# Patient Record
Sex: FEMALE | NOT HISPANIC OR LATINO | Employment: OTHER | URBAN - METROPOLITAN AREA
[De-identification: names, ages, dates, MRNs, and addresses within clinical notes are randomized per-mention and may not be internally consistent; named-entity substitution may affect disease eponyms.]

---

## 2018-10-02 ENCOUNTER — NURSE TRIAGE (OUTPATIENT)
Dept: NURSING | Facility: CLINIC | Age: 63
End: 2018-10-02

## 2018-10-03 ENCOUNTER — APPOINTMENT (OUTPATIENT)
Dept: GENERAL RADIOLOGY | Facility: CLINIC | Age: 63
End: 2018-10-03
Attending: EMERGENCY MEDICINE
Payer: MEDICARE

## 2018-10-03 ENCOUNTER — APPOINTMENT (OUTPATIENT)
Dept: CT IMAGING | Facility: CLINIC | Age: 63
End: 2018-10-03
Attending: EMERGENCY MEDICINE
Payer: MEDICARE

## 2018-10-03 ENCOUNTER — HOSPITAL ENCOUNTER (EMERGENCY)
Facility: CLINIC | Age: 63
Discharge: HOME | End: 2018-10-03
Attending: EMERGENCY MEDICINE | Admitting: EMERGENCY MEDICINE
Payer: MEDICARE

## 2018-10-03 VITALS
OXYGEN SATURATION: 100 % | BODY MASS INDEX: 30.11 KG/M2 | DIASTOLIC BLOOD PRESSURE: 71 MMHG | RESPIRATION RATE: 21 BRPM | SYSTOLIC BLOOD PRESSURE: 117 MMHG | HEART RATE: 117 BPM | TEMPERATURE: 98.1 F | WEIGHT: 170 LBS

## 2018-10-03 VITALS — SYSTOLIC BLOOD PRESSURE: 131 MMHG | TEMPERATURE: 98.4 F | OXYGEN SATURATION: 99 % | DIASTOLIC BLOOD PRESSURE: 66 MMHG

## 2018-10-03 DIAGNOSIS — L03.116 CELLULITIS OF LEFT LOWER EXTREMITY: ICD-10-CM

## 2018-10-03 DIAGNOSIS — R00.0 TACHYCARDIA: ICD-10-CM

## 2018-10-03 DIAGNOSIS — R19.7 DIARRHEA, UNSPECIFIED TYPE: ICD-10-CM

## 2018-10-03 DIAGNOSIS — R52 DIFFUSE PAIN: ICD-10-CM

## 2018-10-03 LAB
ALBUMIN SERPL-MCNC: 3.7 G/DL (ref 3.4–5)
ALBUMIN UR-MCNC: NEGATIVE MG/DL
ALP SERPL-CCNC: 99 U/L (ref 40–150)
ALT SERPL W P-5'-P-CCNC: 21 U/L (ref 0–50)
ANION GAP SERPL CALCULATED.3IONS-SCNC: 12 MMOL/L (ref 3–14)
ANION GAP SERPL CALCULATED.3IONS-SCNC: 9 MMOL/L (ref 3–14)
APPEARANCE UR: CLEAR
AST SERPL W P-5'-P-CCNC: 15 U/L (ref 0–45)
BACTERIA #/AREA URNS HPF: ABNORMAL /HPF
BASOPHILS # BLD AUTO: 0 10E9/L (ref 0–0.2)
BASOPHILS # BLD AUTO: 0.1 10E9/L (ref 0–0.2)
BASOPHILS NFR BLD AUTO: 0.5 %
BASOPHILS NFR BLD AUTO: 0.6 %
BILIRUB SERPL-MCNC: 0.6 MG/DL (ref 0.2–1.3)
BILIRUB UR QL STRIP: NEGATIVE
BUN SERPL-MCNC: 12 MG/DL (ref 7–30)
BUN SERPL-MCNC: 7 MG/DL (ref 7–30)
CALCIUM SERPL-MCNC: 7.3 MG/DL (ref 8.5–10.1)
CALCIUM SERPL-MCNC: 8.2 MG/DL (ref 8.5–10.1)
CHLORIDE SERPL-SCNC: 108 MMOL/L (ref 94–109)
CHLORIDE SERPL-SCNC: 113 MMOL/L (ref 94–109)
CO2 SERPL-SCNC: 18 MMOL/L (ref 20–32)
CO2 SERPL-SCNC: 20 MMOL/L (ref 20–32)
COLOR UR AUTO: YELLOW
CREAT SERPL-MCNC: 0.78 MG/DL (ref 0.52–1.04)
CREAT SERPL-MCNC: 0.84 MG/DL (ref 0.52–1.04)
D DIMER PPP FEU-MCNC: 0.5 UG/ML FEU (ref 0–0.5)
DIFFERENTIAL METHOD BLD: ABNORMAL
DIFFERENTIAL METHOD BLD: NORMAL
EOSINOPHIL # BLD AUTO: 0.1 10E9/L (ref 0–0.7)
EOSINOPHIL # BLD AUTO: 0.2 10E9/L (ref 0–0.7)
EOSINOPHIL NFR BLD AUTO: 1.5 %
EOSINOPHIL NFR BLD AUTO: 3 %
ERYTHROCYTE [DISTWIDTH] IN BLOOD BY AUTOMATED COUNT: 13.2 % (ref 10–15)
ERYTHROCYTE [DISTWIDTH] IN BLOOD BY AUTOMATED COUNT: 13.4 % (ref 10–15)
GFR SERPL CREATININE-BSD FRML MDRD: 68 ML/MIN/1.7M2
GFR SERPL CREATININE-BSD FRML MDRD: 75 ML/MIN/1.7M2
GLUCOSE SERPL-MCNC: 91 MG/DL (ref 70–99)
GLUCOSE SERPL-MCNC: 98 MG/DL (ref 70–99)
GLUCOSE UR STRIP-MCNC: NEGATIVE MG/DL
HCT VFR BLD AUTO: 32.7 % (ref 35–47)
HCT VFR BLD AUTO: 37 % (ref 35–47)
HGB BLD-MCNC: 10.7 G/DL (ref 11.7–15.7)
HGB BLD-MCNC: 11.8 G/DL (ref 11.7–15.7)
HGB UR QL STRIP: NEGATIVE
IMM GRANULOCYTES # BLD: 0 10E9/L (ref 0–0.4)
IMM GRANULOCYTES # BLD: 0 10E9/L (ref 0–0.4)
IMM GRANULOCYTES NFR BLD: 0.1 %
IMM GRANULOCYTES NFR BLD: 0.4 %
INTERPRETATION ECG - MUSE: NORMAL
KETONES UR STRIP-MCNC: NEGATIVE MG/DL
LACTATE BLD-SCNC: 0.9 MMOL/L (ref 0.7–2)
LEUKOCYTE ESTERASE UR QL STRIP: ABNORMAL
LIPASE SERPL-CCNC: 282 U/L (ref 73–393)
LYMPHOCYTES # BLD AUTO: 2.7 10E9/L (ref 0.8–5.3)
LYMPHOCYTES # BLD AUTO: 3.1 10E9/L (ref 0.8–5.3)
LYMPHOCYTES NFR BLD AUTO: 33.1 %
LYMPHOCYTES NFR BLD AUTO: 36.5 %
MCH RBC QN AUTO: 30.6 PG (ref 26.5–33)
MCH RBC QN AUTO: 30.7 PG (ref 26.5–33)
MCHC RBC AUTO-ENTMCNC: 31.9 G/DL (ref 31.5–36.5)
MCHC RBC AUTO-ENTMCNC: 32.7 G/DL (ref 31.5–36.5)
MCV RBC AUTO: 93 FL (ref 78–100)
MCV RBC AUTO: 96 FL (ref 78–100)
MONOCYTES # BLD AUTO: 0.5 10E9/L (ref 0–1.3)
MONOCYTES # BLD AUTO: 0.6 10E9/L (ref 0–1.3)
MONOCYTES NFR BLD AUTO: 6.4 %
MONOCYTES NFR BLD AUTO: 6.9 %
NEUTROPHILS # BLD AUTO: 3.9 10E9/L (ref 1.6–8.3)
NEUTROPHILS # BLD AUTO: 5.5 10E9/L (ref 1.6–8.3)
NEUTROPHILS NFR BLD AUTO: 52.7 %
NEUTROPHILS NFR BLD AUTO: 58.3 %
NITRATE UR QL: NEGATIVE
NRBC # BLD AUTO: 0 10*3/UL
NRBC # BLD AUTO: 0 10*3/UL
NRBC BLD AUTO-RTO: 0 /100
NRBC BLD AUTO-RTO: 0 /100
PH UR STRIP: 6 PH (ref 5–7)
PLATELET # BLD AUTO: 214 10E9/L (ref 150–450)
PLATELET # BLD AUTO: 249 10E9/L (ref 150–450)
POTASSIUM SERPL-SCNC: 3.2 MMOL/L (ref 3.4–5.3)
POTASSIUM SERPL-SCNC: 3.4 MMOL/L (ref 3.4–5.3)
PROT SERPL-MCNC: 7.5 G/DL (ref 6.8–8.8)
RBC # BLD AUTO: 3.5 10E12/L (ref 3.8–5.2)
RBC # BLD AUTO: 3.84 10E12/L (ref 3.8–5.2)
RBC #/AREA URNS AUTO: <1 /HPF (ref 0–2)
SODIUM SERPL-SCNC: 138 MMOL/L (ref 133–144)
SODIUM SERPL-SCNC: 142 MMOL/L (ref 133–144)
SOURCE: ABNORMAL
SP GR UR STRIP: 1.01 (ref 1–1.03)
SQUAMOUS #/AREA URNS AUTO: <1 /HPF (ref 0–1)
TROPONIN I SERPL-MCNC: <0.015 UG/L (ref 0–0.04)
TSH SERPL DL<=0.005 MIU/L-ACNC: 2.23 MU/L (ref 0.4–4)
UROBILINOGEN UR STRIP-MCNC: 2 MG/DL (ref 0–2)
WBC # BLD AUTO: 7.4 10E9/L (ref 4–11)
WBC # BLD AUTO: 9.5 10E9/L (ref 4–11)
WBC #/AREA URNS AUTO: 12 /HPF (ref 0–5)

## 2018-10-03 PROCEDURE — 85025 COMPLETE CBC W/AUTO DIFF WBC: CPT | Performed by: EMERGENCY MEDICINE

## 2018-10-03 PROCEDURE — 96360 HYDRATION IV INFUSION INIT: CPT

## 2018-10-03 PROCEDURE — 83605 ASSAY OF LACTIC ACID: CPT | Performed by: EMERGENCY MEDICINE

## 2018-10-03 PROCEDURE — 80048 BASIC METABOLIC PNL TOTAL CA: CPT | Performed by: EMERGENCY MEDICINE

## 2018-10-03 PROCEDURE — 85379 FIBRIN DEGRADATION QUANT: CPT | Performed by: EMERGENCY MEDICINE

## 2018-10-03 PROCEDURE — 84443 ASSAY THYROID STIM HORMONE: CPT | Performed by: EMERGENCY MEDICINE

## 2018-10-03 PROCEDURE — 93005 ELECTROCARDIOGRAM TRACING: CPT

## 2018-10-03 PROCEDURE — 25000128 H RX IP 250 OP 636: Performed by: EMERGENCY MEDICINE

## 2018-10-03 PROCEDURE — 99285 EMERGENCY DEPT VISIT HI MDM: CPT | Mod: 25

## 2018-10-03 PROCEDURE — 25000131 ZZH RX MED GY IP 250 OP 636 PS 637: Mod: GY | Performed by: EMERGENCY MEDICINE

## 2018-10-03 PROCEDURE — 84484 ASSAY OF TROPONIN QUANT: CPT | Performed by: EMERGENCY MEDICINE

## 2018-10-03 PROCEDURE — A9270 NON-COVERED ITEM OR SERVICE: HCPCS | Mod: GY | Performed by: EMERGENCY MEDICINE

## 2018-10-03 PROCEDURE — 36415 COLL VENOUS BLD VENIPUNCTURE: CPT | Performed by: EMERGENCY MEDICINE

## 2018-10-03 PROCEDURE — 71046 X-RAY EXAM CHEST 2 VIEWS: CPT

## 2018-10-03 PROCEDURE — 96361 HYDRATE IV INFUSION ADD-ON: CPT

## 2018-10-03 PROCEDURE — 74177 CT ABD & PELVIS W/CONTRAST: CPT

## 2018-10-03 PROCEDURE — 85025 COMPLETE CBC W/AUTO DIFF WBC: CPT | Mod: 91 | Performed by: EMERGENCY MEDICINE

## 2018-10-03 PROCEDURE — 25000132 ZZH RX MED GY IP 250 OP 250 PS 637: Mod: GY | Performed by: EMERGENCY MEDICINE

## 2018-10-03 PROCEDURE — 81001 URINALYSIS AUTO W/SCOPE: CPT | Performed by: EMERGENCY MEDICINE

## 2018-10-03 PROCEDURE — 83690 ASSAY OF LIPASE: CPT | Performed by: EMERGENCY MEDICINE

## 2018-10-03 PROCEDURE — 93308 TTE F-UP OR LMTD: CPT

## 2018-10-03 PROCEDURE — 96360 HYDRATION IV INFUSION INIT: CPT | Mod: 59

## 2018-10-03 PROCEDURE — 80053 COMPREHEN METABOLIC PANEL: CPT | Performed by: EMERGENCY MEDICINE

## 2018-10-03 RX ORDER — DICYCLOMINE HCL 20 MG
20 TABLET ORAL 2 TIMES DAILY
Qty: 20 TABLET | Refills: 0 | Status: SHIPPED | OUTPATIENT
Start: 2018-10-03 | End: 2019-02-14

## 2018-10-03 RX ORDER — ONDANSETRON 4 MG/1
4 TABLET, ORALLY DISINTEGRATING ORAL EVERY 8 HOURS PRN
Qty: 10 TABLET | Refills: 0 | Status: SHIPPED | OUTPATIENT
Start: 2018-10-03 | End: 2019-03-20

## 2018-10-03 RX ORDER — ROPINIROLE 0.25 MG/1
0.75 TABLET, FILM COATED ORAL AT BEDTIME
Qty: 42 TABLET | Refills: 0 | Status: SHIPPED | OUTPATIENT
Start: 2018-10-03 | End: 2018-10-19

## 2018-10-03 RX ORDER — CEPHALEXIN 500 MG/1
500 CAPSULE ORAL 4 TIMES DAILY
Qty: 28 CAPSULE | Refills: 0 | Status: SHIPPED | OUTPATIENT
Start: 2018-10-03 | End: 2018-10-03

## 2018-10-03 RX ORDER — CEPHALEXIN 500 MG/1
500 CAPSULE ORAL 4 TIMES DAILY
Qty: 28 CAPSULE | Refills: 0 | Status: SHIPPED | OUTPATIENT
Start: 2018-10-03 | End: 2019-02-14

## 2018-10-03 RX ORDER — ONDANSETRON 4 MG/1
4 TABLET, ORALLY DISINTEGRATING ORAL ONCE
Status: COMPLETED | OUTPATIENT
Start: 2018-10-03 | End: 2018-10-03

## 2018-10-03 RX ORDER — IOPAMIDOL 755 MG/ML
500 INJECTION, SOLUTION INTRAVASCULAR ONCE
Status: COMPLETED | OUTPATIENT
Start: 2018-10-03 | End: 2018-10-03

## 2018-10-03 RX ORDER — LORAZEPAM 0.5 MG/1
0.5 TABLET ORAL ONCE
Status: COMPLETED | OUTPATIENT
Start: 2018-10-03 | End: 2018-10-03

## 2018-10-03 RX ORDER — DICYCLOMINE HCL 20 MG
20 TABLET ORAL 2 TIMES DAILY
Qty: 20 TABLET | Refills: 0 | Status: SHIPPED | OUTPATIENT
Start: 2018-10-03 | End: 2018-10-03

## 2018-10-03 RX ADMIN — SODIUM CHLORIDE 61 ML: 9 INJECTION, SOLUTION INTRAVENOUS at 18:46

## 2018-10-03 RX ADMIN — SODIUM CHLORIDE, POTASSIUM CHLORIDE, SODIUM LACTATE AND CALCIUM CHLORIDE 1000 ML: 600; 310; 30; 20 INJECTION, SOLUTION INTRAVENOUS at 04:53

## 2018-10-03 RX ADMIN — ONDANSETRON 4 MG: 4 TABLET, ORALLY DISINTEGRATING ORAL at 08:19

## 2018-10-03 RX ADMIN — SODIUM CHLORIDE 1000 ML: 9 INJECTION, SOLUTION INTRAVENOUS at 02:06

## 2018-10-03 RX ADMIN — SODIUM CHLORIDE 1000 ML: 9 INJECTION, SOLUTION INTRAVENOUS at 18:07

## 2018-10-03 RX ADMIN — IOPAMIDOL 85 ML: 755 INJECTION, SOLUTION INTRAVENOUS at 18:44

## 2018-10-03 RX ADMIN — LORAZEPAM 0.5 MG: 0.5 TABLET ORAL at 04:53

## 2018-10-03 RX ADMIN — SODIUM CHLORIDE 1000 ML: 9 INJECTION, SOLUTION INTRAVENOUS at 03:42

## 2018-10-03 ASSESSMENT — ENCOUNTER SYMPTOMS
DYSURIA: 0
COUGH: 1
DIARRHEA: 1
FEVER: 0
WOUND: 1
APPETITE CHANGE: 0
VOMITING: 0
NAUSEA: 0
DIARRHEA: 1
CHILLS: 1
SHORTNESS OF BREATH: 1
ABDOMINAL PAIN: 1

## 2018-10-03 NOTE — ED AVS SNAPSHOT
[facility] Emergency Department    [address]    [place] [address]    Phone:  [phone]    Fax:  [phone]                                       Lilliana Cardenas   MRN: [id number]    Department:  [facility] Emergency Department   Date of Visit:  10/3/2018           After Visit Summary Signature Page     I have received my discharge instructions, and my questions have been answered. I have discussed any challenges I see with this plan with the nurse or doctor.    ..........................................................................................................................................  Patient/Patient Representative Signature      ..........................................................................................................................................  Patient Representative Print Name and Relationship to Patient    ..................................................               ................................................  Date                                   Time    ..........................................................................................................................................  Reviewed by Signature/Title    ...................................................              ..............................................  Date                                               Time          22EPIC Rev 08/18

## 2018-10-03 NOTE — ED TRIAGE NOTES
"Pt presents to ED via EMS for left leg pain.  States she was in her wheelchair at home when she bumped her left shin and states \"there is a giant blood clot there.\"  Also states she is dehydrated because she hasn't been eating or drinking and has been having severe diarrhea.  Also reports that she can feel her pulse is irregular.  ABCs intact, alert and orientated.  "

## 2018-10-03 NOTE — ED AVS SNAPSHOT
[facility] Emergency Department    [address]    [facility] [address]    Phone:  [phone]    Fax:  [phone]                                       Lilliana Cardenas   MRN: [id number]    Department:  [facility] Emergency Department   Date of Visit:  10/3/2018           After Visit Summary Signature Page     I have received my discharge instructions, and my questions have been answered. I have discussed any challenges I see with this plan with the nurse or doctor.    ..........................................................................................................................................  Patient/Patient Representative Signature      ..........................................................................................................................................  Patient Representative Print Name and Relationship to Patient    ..................................................               ................................................  Date                                   Time    ..........................................................................................................................................  Reviewed by Signature/Title    ...................................................              ..............................................  Date                                               Time          22EPIC Rev 08/18

## 2018-10-03 NOTE — ED PROVIDER NOTES
History     Chief Complaint:  Leg Pain      HPI   Lilliana Cardenas is a 63 year old female who presents with trauma to both her legs on an edge with a subsequent fall which happened 5 days ago. She reports that this is now bleeding. She denies any fever. The patient was at [facility] a week ago for High blood pressure, back pain and since she left she has had constant diarrhea and a decreased appetite. She reports that she did not have these symptoms before her hospital visit. Diarrhea about 3-4 times a day, no blood in it. No nausea or vomiting. She also complains of some right upper quadrant abdominal pain. She reports that her imodium is not helping with this pain. Upon review: The patient is of uncertain living conditions but she reports that she is currently living in [place] but is working on moving down to [place]. She report that she has a sister and nephew down here as well.     Allergies:  No Known Allergies     Medications:    Wellbutrin Sr  Carisoprodol   Citalopram Hydrobromide   Prozac  Flonase  Neurontin  Microzide  Hydrocodone-Acetaminophen   Atarax  Advil,Motrin  Ketoprofen   Lidoderm  Omeprazole  Seroquel  Zocor  Imitrex  Desyrel     Past Medical History:    Anemia   Anxiety   Chronic pain syndrome   Depressive disorder   Diabetes mellitus (H)   Hypertension   Lower extremity neuropathy   Overdose of opiate or related narcotic (H)     Past Surgical History:    GYN Surgery    Family History:    No family history on file.    Social History:  The patient  reports that she has been smoking.  She has a 38.00 pack-year smoking history. She does not have any smokeless tobacco history on file. She reports that she drinks alcohol. She reports that she does not use illicit drugs.   Marital Status:   [4]     Review of Systems   Constitutional: Negative for appetite change and fever.   Gastrointestinal: Positive for abdominal pain and diarrhea. Negative for nausea and vomiting.   Skin:  Positive for wound.   All other systems reviewed and are negative.      Physical Exam   First Vitals:  BP: 127/73  Heart Rate: 102  Temp: 98.4  F (36.9  C)  SpO2: 100 %      Physical Exam  Constitutional: Alert, attentive, GCS 15  HENT:    Nose: Nose normal.    Mouth/Throat: Oropharynx is clear, mucous membranes are moist   Eyes: EOM are normal, anicteric, conjugate gaze  CV: regular rate and rhythm; no murmurs  Chest: Effort normal and breath sounds clear without wheezing or rales, symmetric bilaterally   GI:  Right upper surgical incision. Nontender No distension. No guarding or rebound.    MSK: No LE edema, tenderness to palpation of BLE.Bilateral anterior shin cabs with slight fluctuance on left anterior shin with scant surrounding erythema, warm to the touch.    Neurological: Alert, attentive, moving all extremities equally.   Skin: Skin is warm and dry.  Emergency Department Course   ECG (00:53:48):  Rate 98 bpm. NV interval 138. QRS duration 66. QT/QTc 362/462. P-R-T axes 60 -2 10. Normal Sinus rhythm. Normal EKG.  Interpreted at 0054.  by Gilmar Cagle MD.     Laboratory:  CBC: WNL  CMP: CO2 18, Ca 8.2, otherwise within normal limits   Lipase 282  Troponin<0.015    Interventions:  1 L NS X2  1 L LR  0.5 mg ativan  4 mg IV zofran     Imaging  Impression:      PROCEDURE NOTE: ED BEDSIDE LIMITED ULTRASOUND  Name of the study: Limited Echo  Performed by: Dr. Cagle  Indications: Tachycardia  Body Location / Organs Imaged: Multiple planes of the heart, bilateral lungs.  Findings: Hyperdynamic LV with IVC 1.9 cm.  Tachycardic.   Interpretation: Tachycardia with US suggestive of euvolemia.   Archiving of images: Images were also saved digitally to the internal hard drive of the ultrasound machine.         Impression & Plan    Medical Decision Makin-year-old female with past medical history significant for diabetes, depression, anxiety, hypertension chronic pain presenting for evaluation of several  weeks intermittent nonbloody diarrhea without associated vomiting, diffuse abdominal pain and pain to her left anterior lower leg after she hit her leg on a counter.  Vital signs on arrival notable for heart rate of 102, blood pressure normal at 127/73, afebrile satting well on room air.  Exam reveals normal bowel sounds, nontender and very benign he does have a prior surgical scar from prior cholecystectomy but with a heart rate and dry oromucosa she does have clinical signs of dehydration.  Do not suspect infectious etiology given chronic duration of several weeks, she has no red flag symptoms including weight loss, night sweats blood in her stool or poor p.o. Intake describes her diarrhea as 3-4 loose non-watery nonbloody stools a day.  Lab workup was performed mainly to assess for electrolyte abnormalities, which were within normal limits other than a bicarb of 18 likely secondary to GI loss.  Troponin was within normal limits, EKG shows sinus tachycardia with normal intervals CBC unremarkable, hemoglobin within normal limits platelets within normal limits.  Patient was given 2 L IV fluids with heart rate increasing into the 120s however she was noted to be sleeping comfortably.  After thoroughly evaluate fluids her heart rate remained persistently elevated in the 120s, bedside ultrasound did show IVC 1.9 hyperdynamic LV suggestive against hypovolemia.  Further workup was brought including a TSH which was within normal limits and a d-dimer which was negative.  Patient was asymptomatic without shortness of breath, lightheadedness and orthostatics were negative.  I suspect etiology of her tachycardia is likely opiate withdrawal she has previously been on chronic Percocet this would fit with her mild GI discomfort nausea and tachycardia.  She was given 0.5 mg Ativan and heart rate with fluids improved to the low 100s.  She was ambulatory steady on her feet and plan for discharge home, reporting she can go stay with  family.   Patient was admitted to [facility] 2 weeks prior after she was evicted from her living situation she was seen by psychiatry there is the basis of her hospitalization was due to possible delusions however they felt she was her own decision maker and was discharged.  Patient was unable to provide stool sample in the emergency department making C. difficile unlikely.  With a benign exam, no imaging was felt necessary at this time patient was discharged.  She was instructed to follow-up with her primary care provider for recheck return to the emergency department if she notices dizziness, lightheadedness, chest pain or shortness of breath.  She was given a prescription for Bentyl, Zofran and Keflex as her left lower leg appears to have early cellulitis surrounding her contusion.  Do not suspect sepsis at this time.    Diagnosis:    ICD-10-CM    1. Diarrhea, unspecified type R19.7    2. Cellulitis of left lower extremity L03.116        Disposition:  discharged to home    Discharge Medications:  New Prescriptions    CEPHALEXIN (KEFLEX) 500 MG CAPSULE    Take 1 capsule (500 mg) by mouth 4 times daily for 7 days    DICYCLOMINE (BENTYL) 20 MG TABLET    Take 1 tablet (20 mg) by mouth 2 times daily for 10 days       Gilmar Cagle MD   [employer]  2:09 PM 10/03/18       Amie MCLEAN am serving as a scribe at 12:33 AM on 10/3/2018 to document services personally performed by Gilmar Cagle MD based on my observations and the provider's statements to me.    [facility] EMERGENCY DEPARTMENT       Gilmar Cagle MD  10/03/18 7211

## 2018-10-03 NOTE — ED PROVIDER NOTES
History     Chief Complaint:  Dehydration      HPI   Lilliana Cardenas is a 63 year old female with history of lower extremity neuropathy who presents to the emergency department today for evaluation of dehydration. The patient reports she has been in and out of the hospital for the past two weeks. She was in the emergency department early this morning at 1 am for similar symptoms of dehydration, shortness of breath, and pain bilateral legs. The workup done is below. She was discharged with keflex for possible early cellulitis of LLE and bentyl. Since this morning the patient has stayed in the emergency department lobby, and didn't go to the bathroom once despite being given 3 liters of fluids. Due to these symptoms persisting she presented to the emergency department today. She reports she has been off of her pain medication for one week.  She endorses coughing and chills. She denies chest pain and dysuria.    Was admitted at [facility] recently but deemed to be able to make her own decisions and was discharged to a hotel. Has been staying at hotels since.    10/3/18  CBC: WNL  CMP: CO2 18, Ca 8.2, otherwise within normal limits   Lipase 282  Troponin<0.015    Allergies:  No Known Drug Allergies        Medications:    buPROPion (WELLBUTRIN SR) 150 MG 12 hr tablet  CARISOPRODOL PO  cephALEXin (KEFLEX) 500 MG capsule  CITALOPRAM HYDROBROMIDE PO  dicyclomine (BENTYL) 20 MG tablet  FLUoxetine (PROZAC) 20 MG capsule  fluticasone (FLONASE) 50 MCG/ACT nasal spray  gabapentin (NEURONTIN) 800 MG tablet  hydrochlorothiazide (MICROZIDE) 12.5 MG capsule  HYDROcodone-acetaminophen  MG per tablet  hydrocodone-acetaminophen  MG per tablet  hydrOXYzine (ATARAX) 25 MG tablet  ibuprofen (ADVIL,MOTRIN) 600 MG tablet  ketoprofen 10% in PLO 10%  lidocaine (LIDODERM) 5 % patch  OMEPRAZOLE PO  ondansetron (ZOFRAN ODT) 4 MG ODT tab  quetiapine (SEROQUEL) 50 MG tablet  ropinirole (REQUIP) 0.25 MG  tablet  simvastatin (ZOCOR) 10 MG tablet  sumatriptan (IMITREX) 50 MG tablet  TRAZodone (DESYREL) 50 MG tablet        Past Medical History:    Anemia  Anxiety  Chronic pain syndrome  Depression  Diabetes  Hypertension  Lower extremity neuropathy  Overdose of opiate      Past Surgical History:    GYN Surgery      Family History:    History reviewed. No pertinent family history.        Social History:  The patient was accompanied to the ED by herself.  Smoking Status: Current Every Day Smoker  Alcohol Use: Yes   Marital Status:   [4]       Review of Systems   Constitutional: Positive for chills.   Respiratory: Positive for cough and shortness of breath.    Cardiovascular: Negative for chest pain.   Gastrointestinal: Positive for diarrhea.   Genitourinary: Negative for dysuria.   All other systems reviewed and are negative.        Physical Exam   First Vitals:  BP: (!) 163/92  Pulse: 117  Heart Rate: 111  Temp: 100.1  F (37.8  C)  Resp: 16  Weight: 77.1 kg (170 lb)  SpO2: 100 %      Physical Exam     General: Resting comfortably on the gurney  Eyes:  The pupils are equal and round    Conjunctivae and sclerae are normal  ENT:    Moist mucous membranes  Neck:  Normal range of motion  CV:  Tachycardic rate and rhythm    Skin warm and well perfused   Resp:  Lungs are clear    Non-labored    No rales    No wheezing   GI:  Abdomen is soft, there is no rigidity    No distension    No rebound tenderness     Minimal diffuse abdominal tenderness    No CVA tenderness  MS:  Scab on bilateral shins, no warmth on LE, minimal erythema on left anterior shin near scab  Skin:  No rash or acute skin lesions noted  Neuro:   Awake, alert.      Speech is normal and fluent.    Face is symmetric.     Moves all extremities equally  Psych: Normal affect.  Appropriate interactions.   Emergency Department Course       Imaging:  Radiology findings were communicated with the patient who voiced understanding of the findings.    CT  Abdomen/Pelvis w Contrast:  IMPRESSION: No cause of acute pain identified in the abdomen or  pelvis. Note that the appendix is not visualized.  Report per radiology       Chest X-Ray. PA & LAT:   IMPRESSION: No acute cardiopulmonary abnormalities.  reading per radiology.      Laboratory:  Laboratory findings were communicated with the patient who voiced understanding of the findings.    CBC: WBC 7.4, HGB 10.7 (L),    BMP: Potassium 3.2 (L), Chloride 113 (H), Calcium 7.3 (L) o/w WNL (Creatinine 0.78)   Lactic Acid: 0.9     UA: Yellow and Clear. Leukocyte Esterase Urine Moderate, WBC/HPF 12 (H), Bacteria Few o/w WNL      Emergency Department Course:  Nursing notes and vitals reviewed.  1713: I performed an exam of the patient as documented above.   IV was inserted and blood was drawn for laboratory testing, results above.   The patient was sent for a CT Abdomen Pelvis and Chest XR while in the emergency department, results above.    1957 Patient rechecked and updated.    2030 Patient rechecked and updated.    2056 Patient rechecked and updated.    Findings and plan explained to the Patient. Patient discharged home with instructions regarding supportive care, medications, and reasons to return. The importance of close follow-up was reviewed.   I personally reviewed the laboratory and imaging results with the Patient and answered all related questions prior to discharge.   Impression & Plan      Medical Decision Making:  Lilliana Cardenas is a 63 year old female who presents to the emergency department with dehydration.  Patient seen in the emergency department a little over 12 hours ago and never left the hospital.  She is mildly tachycardic and temperature is slightly elevated at 100.1.  Diagnosed with cellulitis of lower extremity and given prescription for Keflex.  She is otherwise well-appearing.  Complaining of some mild shortness of breath and abdominal pain.  Had a normal troponin and d-dimer earlier  today and do not think that these need to be repeated.  Doubt PE.  There is no leg swelling to suggest DVT.  There may be early cellulitis of lower extremity as suspected at last ED visit. CT abdomen with no acute findings. UA abnormal though patient not having urinary symptoms. Chest xray clear. tachycardia resolved in the emergency department and repeat temperature was normal. No episode of diarrhea in ED and patient did urinate.  She is already on Keflex for possible early cellulitis and this should cover the abnormal urine though suspect that this is not a true urinary tract infection.  She is overall well-appearing.  She was admitted at [facility] recently and deemed to be able to make her own decisions and discharged to a hotel.  She says that she will be staying at a hotel again.  Says she will take a cab to a [employer] hotel.  Wants to leave with a prescription for requip as she does not have this medication with her and she has restless leg.     Diagnosis:    ICD-10-CM    1. Diarrhea, unspecified type R19.7    2. Diffuse pain R52        Disposition:  Discharged to home.    Discharge medication:  rOPINRole (Requip) 0.25 MG Tablet   Details: Take 3 tablets (0.75 mg ) by mouth. At Bedtime for 14      days. Qty: 42 tablets, Refill 0.    Scribe Disclosure:  I, Martha Soriano, am serving as a scribe at 5:22 PM on 10/3/2018 to document services personally performed by Carmencita Lay MD based on my observations and the provider's statements to me.    Martha Soriano  10/3/2018   [facility] EMERGENCY DEPARTMENT       Carmencita Lay MD  10/04/18 0354

## 2018-10-03 NOTE — ED NOTES
Bed: ED06  Expected date: 10/3/18  Expected time: 12:07 AM  Means of arrival: Ambulance  Comments:  HE 62 yo leg pain

## 2018-10-03 NOTE — ED AVS SNAPSHOT
[facility] Emergency Department    [address] [place] [address]    Phone:  [phone]    Fax:  [phone]                                       Lilliana Cardenas   MRN: [id number]    Department:  [facility] Emergency Department   Date of Visit:  10/3/2018           Patient Information     Date Of Birth          1955        Your diagnoses for this visit were:     None       You were seen by Carmencita Lay MD.      24 Hour Appointment Hotline       To make an appointment at any [facility] clinic, call [phone]). If you don't have a family doctor or clinic, we will help you find one. [facility] clinics are conveniently located to serve the needs of you and your family.             Review of your medicines      Our records show that you are taking the medicines listed below. If these are incorrect, please call your family doctor or clinic.        Dose / Directions Last dose taken    buPROPion 150 MG 12 hr tablet   Commonly known as:  WELLBUTRIN SR   Dose:  150 mg   Quantity:  60 tablet        Take 1 tablet by mouth 2 times daily.   Refills:  0        CARISOPRODOL PO        Take  by mouth.   Refills:  0        cephALEXin 500 MG capsule   Commonly known as:  KEFLEX   Dose:  500 mg   Quantity:  28 capsule        Take 1 capsule (500 mg) by mouth 4 times daily for 7 days   Refills:  0        CITALOPRAM HYDROBROMIDE PO        Take  by mouth.   Refills:  0        dicyclomine 20 MG tablet   Commonly known as:  BENTYL   Dose:  20 mg   Quantity:  20 tablet        Take 1 tablet (20 mg) by mouth 2 times daily for 10 days   Refills:  0        FLUoxetine 20 MG capsule   Commonly known as:  PROzac   Dose:  20 mg   Quantity:  30 capsule        Take 1 capsule by mouth every morning.   Refills:  0        fluticasone 50 MCG/ACT spray   Commonly known as:  FLONASE   Dose:  2 spray   Quantity:  1 Package        2 sprays by Both Nostrils route daily.    Refills:  0        gabapentin 800 MG tablet   Commonly known as:  NEURONTIN   Dose:  800 mg   Quantity:  90 tablet        Take 1 tablet by mouth 3 times daily.   Refills:  0        hydrochlorothiazide 12.5 MG capsule   Commonly known as:  MICROZIDE   Dose:  12.5 mg   Quantity:  30 capsule        Take 1 capsule by mouth daily.   Refills:  0        * HYDROcodone-acetaminophen  MG per tablet   Commonly known as:  NORCO   Dose:  1 tablet   Indication:  Moderate to Moderately Severe Pain   Quantity:  90 tablet        Take 1 tablet by mouth every 4 hours as needed. Indications: Moderate to Moderately Severe Pain   Refills:  0        * HYDROcodone-acetaminophen  MG per tablet   Commonly known as:  NORCO   Dose:  1 tablet   Quantity:  15 tablet        Take 1 tablet by mouth every 6 hours as needed for pain.   Refills:  0        hydrOXYzine 25 MG tablet   Commonly known as:  ATARAX   Dose:  25 mg   Quantity:  120 tablet        Take 1 tablet by mouth 4 times daily as needed for itching.   Refills:  0        ibuprofen 600 MG tablet   Commonly known as:  ADVIL/MOTRIN   Dose:  600 mg        Take 600 mg by mouth every 8 hours as needed.   Refills:  0        ketoprofen 10% in PLO 10% topical gel        Apply  topically 4 times daily as needed. To back and knees   Refills:  0        lidocaine 5 % Patch   Commonly known as:  LIDODERM   Dose:  1-3 patch        Place 1-3 patches onto the skin every 24 hours. Apply 1 patch to skin. Wear for 12 hours and remove for 12 hrs   Refills:  0        OMEPRAZOLE PO        Take  by mouth.   Refills:  0        ondansetron 4 MG ODT tab   Commonly known as:  ZOFRAN ODT   Dose:  4 mg   Quantity:  10 tablet        Take 1 tablet (4 mg) by mouth every 8 hours as needed for nausea   Refills:  0        QUEtiapine 50 MG tablet   Commonly known as:  SEROquel   Dose:  50 mg   Quantity:  30 tablet        Take 1 tablet by mouth every 4 hours as needed (racing thoughts, anxiety).   Refills:  1         rOPINIRole 0.25 MG tablet   Commonly known as:  REQUIP   Dose:  0.25 mg   Indication:  Restless Leg Syndrome   Quantity:  60 tablet        Take 1 tablet by mouth 2 times daily. Indications: Restless Leg Syndrome   Refills:  0        simvastatin 10 MG tablet   Commonly known as:  ZOCOR   Dose:  10 mg   Quantity:  30 tablet        Take 1 tablet by mouth every evening.   Refills:  0        SUMAtriptan 50 MG tablet   Commonly known as:  IMITREX   Dose:  50 mg        Take 50 mg by mouth at onset of headache. May repeat in 2 hours if needed: max 2/day; average number of headaches monthly ***; Per [facility], this is a current medication   Refills:  0        traZODone 50 MG tablet   Commonly known as:  DESYREL   Dose:  50 mg   Quantity:  30 tablet        Take 1 tablet by mouth nightly as needed for sleep.   Refills:  0        * Notice:  This list has 2 medication(s) that are the same as other medications prescribed for you. Read the directions carefully, and ask your doctor or other care provider to review them with you.            Procedures and tests performed during your visit     Basic metabolic panel    CBC with platelets differential    CT Abdomen Pelvis w Contrast    Chest XR,  PA & LAT    Give 20 ounces of water 15 minutes before CT of abdomen    Lactic acid whole blood    UA with Microscopic      Orders Needing Specimen Collection     None      Pending Results     Date and Time Order Name Status Description    10/3/2018 1723 CT Abdomen Pelvis w Contrast Preliminary     10/3/2018 1723 Chest XR,  PA & LAT Preliminary             Pending Culture Results     No orders found from 10/1/2018 to 10/4/2018.            Pending Results Instructions     If you had any lab results that were not finalized at the time of your Discharge, you can call the ED Lab Result RN at [phone]. You will be contacted by this team for any positive Lab results or changes in treatment. The nurses are available 7  days a week from 10A to 6:30P.  You can leave a message 24 hours per day and they will return your call.        Test Results From Your Hospital Stay        10/3/2018  6:23 PM      Component Results     Component Value Ref Range & Units Status    WBC 7.4 4.0 - 11.0 10e9/L Final    RBC Count 3.50 (L) 3.8 - 5.2 10e12/L Final    Hemoglobin 10.7 (L) 11.7 - 15.7 g/dL Final    Hematocrit 32.7 (L) 35.0 - 47.0 % Final    MCV 93 78 - 100 fl Final    MCH 30.6 26.5 - 33.0 pg Final    MCHC 32.7 31.5 - 36.5 g/dL Final    RDW 13.4 10.0 - 15.0 % Final    Platelet Count 214 150 - 450 10e9/L Final    Diff Method Automated Method  Final    % Neutrophils 52.7 % Final    % Lymphocytes 36.5 % Final    % Monocytes 6.9 % Final    % Eosinophils 3.0 % Final    % Basophils 0.5 % Final    % Immature Granulocytes 0.4 % Final    Nucleated RBCs 0 0 /100 Final    Absolute Neutrophil 3.9 1.6 - 8.3 10e9/L Final    Absolute Lymphocytes 2.7 0.8 - 5.3 10e9/L Final    Absolute Monocytes 0.5 0.0 - 1.3 10e9/L Final    Absolute Eosinophils 0.2 0.0 - 0.7 10e9/L Final    Absolute Basophils 0.0 0.0 - 0.2 10e9/L Final    Abs Immature Granulocytes 0.0 0 - 0.4 10e9/L Final    Absolute Nucleated RBC 0.0  Final         10/3/2018  6:33 PM      Component Results     Component Value Ref Range & Units Status    Sodium 142 133 - 144 mmol/L Final    Potassium 3.2 (L) 3.4 - 5.3 mmol/L Final    Chloride 113 (H) 94 - 109 mmol/L Final    Carbon Dioxide 20 20 - 32 mmol/L Final    Anion Gap 9 3 - 14 mmol/L Final    Glucose 91 70 - 99 mg/dL Final    Urea Nitrogen 7 7 - 30 mg/dL Final    Creatinine 0.78 0.52 - 1.04 mg/dL Final    GFR Estimate 75 >60 mL/min/1.7m2 Final    Non  GFR Calc    GFR Estimate If Black >90 >60 mL/min/1.7m2 Final    African American GFR Calc    Calcium 7.3 (L) 8.5 - 10.1 mg/dL Final         10/3/2018  7:41 PM      Component Results     Component Value Ref Range & Units Status    Color Urine Yellow  Final    Appearance Urine Clear  Final     Glucose Urine Negative NEG^Negative mg/dL Final    Bilirubin Urine Negative NEG^Negative Final    Ketones Urine Negative NEG^Negative mg/dL Final    Specific Gravity Urine 1.008 1.003 - 1.035 Final    Blood Urine Negative NEG^Negative Final    pH Urine 6.0 5.0 - 7.0 pH Final    Protein Albumin Urine Negative NEG^Negative mg/dL Final    Urobilinogen mg/dL 2.0 0.0 - 2.0 mg/dL Final    Nitrite Urine Negative NEG^Negative Final    Leukocyte Esterase Urine Moderate (A) NEG^Negative Final    Source Midstream Urine  Final    WBC Urine 12 (H) 0 - 5 /HPF Final    RBC Urine <1 0 - 2 /HPF Final    Bacteria Urine Few (A) NEG^Negative /HPF Final    Squamous Epithelial /HPF Urine <1 0 - 1 /HPF Final         10/3/2018  8:14 PM      Narrative     CHEST TWO VIEWS   10/3/2018 7:48 PM     HISTORY: Cough.     COMPARISON: 12/11/2011.     FINDINGS: Cardiomediastinal silhouette within normal limits. No focal  airspace opacities. No pleural effusion. No pneumothorax identified.  Abnormal kyphotic curvature of the lower thoracic spine with several  age-indeterminate anterior compression deformities. Lumbar spinal  fusion hardware partially visualized.         Impression     IMPRESSION: No acute cardiopulmonary abnormalities.         10/3/2018  7:22 PM      Narrative     CT ABDOMEN AND PELVIS WITH CONTRAST   10/3/2018 6:55 PM     HISTORY: Diffuse abdominal pain and diarrhea.    COMPARISON: None.    TECHNIQUE: Following the uneventful administration of 85 mL Isovue-370  intravenous contrast, helical sections were acquired from the top of  the diaphragm through the pubic symphysis. Coronal reconstructions  were generated. Radiation dose for this scan was reduced using  automated exposure control, adjustment of the mA and/or kV according  to the patient's size, or iterative reconstruction technique.    FINDINGS:     Abdomen: Subcentimeter low-attenuation lesion in the liver dome, too  small to characterize. The spleen, pancreas, adrenal glands  and right  kidney are unremarkable. 1.4 cm cyst in the interpolar region of the  left kidney. The gallbladder is not visualized. No enlarged lymph  nodes or free fluid in the upper abdomen. Atherosclerotic  calcification in the abdominal aorta.    Scan through the lower chest is significant for coronary artery  calcification.    Pelvis: The small and large bowel are normal in caliber. The appendix  is not visualized. No bowel wall thickening, pneumatosis or free  intraperitoneal gas. The uterus is not visualized. Mild distention of  the urinary bladder. No enlarged lymph nodes or free fluid in the  pelvis. Fusion hardware in the lumbar spine.        Impression     IMPRESSION: No cause of acute pain identified in the abdomen or  pelvis. Note that the appendix is not visualized.         10/3/2018  6:17 PM      Component Results     Component Value Ref Range & Units Status    Lactic Acid 0.9 0.7 - 2.0 mmol/L Final                Clinical Quality Measure: Blood Pressure Screening     Your blood pressure was checked while you were in the emergency department today. The last reading we obtained was  BP: 117/71 . Please read the guidelines below about what these numbers mean and what you should do about them.  If your systolic blood pressure (the top number) is less than 120 and your diastolic blood pressure (the bottom number) is less than 80, then your blood pressure is normal. There is nothing more that you need to do about it.  If your systolic blood pressure (the top number) is 120-139 or your diastolic blood pressure (the bottom number) is 80-89, your blood pressure may be higher than it should be. You should have your blood pressure rechecked within a year by a primary care provider.  If your systolic blood pressure (the top number) is 140 or greater or your diastolic blood pressure (the bottom number) is 90 or greater, you may have high blood pressure. High blood pressure is treatable, but if left untreated over  "time it can put you at risk for heart attack, stroke, or kidney failure. You should have your blood pressure rechecked by a primary care provider within the next 4 weeks.  If your provider in the emergency department today gave you specific instructions to follow-up with your doctor or provider even sooner than that, you should follow that instruction and not wait for up to 4 weeks for your follow-up visit.        Thank you for choosing [facility]       Thank you for choosing [facility] for your care. Our goal is always to provide you with excellent care. Hearing back from our patients is one way we can continue to improve our services. Please take a few minutes to complete the written survey that you may receive in the mail after you visit with us. Thank you!        [facility] [employer] Information     [employer] lets you send messages to your doctor, view your test results, renew your prescriptions, schedule appointments and more. To sign up, go to [email or web address] . Click on \"Log in\" on the left side of the screen, which will take you to the Welcome page. Then click on \"Sign up Now\" on the right side of the page.     You will be asked to enter the access code listed below, as well as some personal information. Please follow the directions to create your username and password.     Your access code is: [email or web address]  Expires: 2019  3:02 AM     Your access code will  in 90 days. If you need help or a new code, please call your [facility] clinic or [phone].        Care EveryWhere ID     This is your Care EveryWhere ID. This could be used by other organizations to access your [facility] medical records  [id number]        Equal Access to Services     [facility]: Hadii mi Baird, waaxda luqadaha, qaybta kaallina hook . So [facility] [phone].    ATENCIÓN: Si habla español, tiene a lai disposición servicios gratuitos de asistencia lingüística. Llame al " [phone].    We comply with applicable federal civil rights laws and [place] laws. We do not discriminate on the basis of race, color, national origin, age, disability, sex, sexual orientation, or gender identity.            After Visit Summary       This is your record. Keep this with you and show to your community pharmacist(s) and doctor(s) at your next visit.

## 2018-10-03 NOTE — DISCHARGE INSTRUCTIONS
You must drink plenty of fluids at home to stay well-hydrated.  You should return the emergency department if you feel dizzy, lightheadedness, chest pain or shortness of breath.  You should use the Zofran as needed to help you drink plenty of fluids, the Bentyl will help with your crampy abdominal pain and they reduce her diarrhea.  You were given a prescription for Keflex which is an antibiotic to help with the pain and swelling of your left shin as you likely have an early infection of the skin there.  You should return the emergency department if you have increasing pain or swelling in your leg or develop fevers despite taking antibiotics.    You must follow-up with your primary care doctor for recheck in 1-2 days of your symptoms and heart rate being elevated.

## 2018-10-03 NOTE — ED AVS SNAPSHOT
[facility]    [address]    [facility] [address]    Phone:  [phone]    Fax:  [phone]                                       Lilliana Cardenas   MRN: [id number]    Department:  [facility] Department   Date of Visit:  10/3/2018           Patient Information     Date Of Birth          1955        Your diagnoses for this visit were:     Diarrhea, unspecified type     Cellulitis of left lower extremity     Tachycardia        You were seen by Gilmar Cagle MD.      Follow-up Information     Schedule an appointment as soon as possible for a visit with Uzair Poe MD.    Specialty:  Internal Medicine    Why:  follow up loose stools and skin infection    Contact information:    [facility]  [address]  [place] [address]  [phone]          Follow up with [facility] Emergency Department.    Specialty:  EMERGENCY MEDICINE    Why:  As needed, If symptoms worsen    Contact information:    [address]  [facility] [address] [phone]        Discharge Instructions       You must drink plenty of fluids at home to stay well-hydrated.  You should return the emergency department if you feel dizzy, lightheadedness, chest pain or shortness of breath.  You should use the Zofran as needed to help you drink plenty of fluids, the Bentyl will help with your crampy abdominal pain and they reduce her diarrhea.  You were given a prescription for Keflex which is an antibiotic to help with the pain and swelling of your left shin as you likely have an early infection of the skin there.  You should return the emergency department if you have increasing pain or swelling in your leg or develop fevers despite taking antibiotics.    You must follow-up with your primary care doctor for recheck in 1-2 days of your symptoms and heart rate being elevated.    24 Hour Appointment Hotline       To make  an appointment at any [facility], call [phone]). If you don't have a family doctor or clinic, we will help you find one. [facility] clinics are conveniently located to serve the needs of you and your family.             Review of your medicines      START taking        Dose / Directions Last dose taken    * cephALEXin 500 MG capsule   Commonly known as:  KEFLEX   Dose:  500 mg   Quantity:  28 capsule        Take 1 capsule (500 mg) by mouth 4 times daily for 7 days   Refills:  0        * cephALEXin 500 MG capsule   Commonly known as:  KEFLEX   Dose:  500 mg   Quantity:  28 capsule        Take 1 capsule (500 mg) by mouth 4 times daily for 7 days   Refills:  0        * dicyclomine 20 MG tablet   Commonly known as:  BENTYL   Dose:  20 mg   Quantity:  20 tablet        Take 1 tablet (20 mg) by mouth 2 times daily for 10 days   Refills:  0        * dicyclomine 20 MG tablet   Commonly known as:  BENTYL   Dose:  20 mg   Quantity:  20 tablet        Take 1 tablet (20 mg) by mouth 2 times daily for 10 days   Refills:  0        ondansetron 4 MG ODT tab   Commonly known as:  ZOFRAN ODT   Dose:  4 mg   Quantity:  10 tablet        Take 1 tablet (4 mg) by mouth every 8 hours as needed for nausea   Refills:  0        * Notice:  This list has 4 medication(s) that are the same as other medications prescribed for you. Read the directions carefully, and ask your doctor or other care provider to review them with you.      Our records show that you are taking the medicines listed below. If these are incorrect, please call your family doctor or clinic.        Dose / Directions Last dose taken    buPROPion 150 MG 12 hr tablet   Commonly known as:  WELLBUTRIN SR   Dose:  150 mg   Quantity:  60 tablet        Take 1 tablet by mouth 2 times daily.   Refills:  0        CARISOPRODOL PO        Take  by mouth.   Refills:  0        CITALOPRAM HYDROBROMIDE PO        Take  by mouth.   Refills:  0        FLUoxetine 20 MG  capsule   Commonly known as:  PROzac   Dose:  20 mg   Quantity:  30 capsule        Take 1 capsule by mouth every morning.   Refills:  0        fluticasone 50 MCG/ACT spray   Commonly known as:  FLONASE   Dose:  2 spray   Quantity:  1 Package        2 sprays by Both Nostrils route daily.   Refills:  0        gabapentin 800 MG tablet   Commonly known as:  NEURONTIN   Dose:  800 mg   Quantity:  90 tablet        Take 1 tablet by mouth 3 times daily.   Refills:  0        hydrochlorothiazide 12.5 MG capsule   Commonly known as:  MICROZIDE   Dose:  12.5 mg   Quantity:  30 capsule        Take 1 capsule by mouth daily.   Refills:  0        * HYDROcodone-acetaminophen  MG per tablet   Commonly known as:  NORCO   Dose:  1 tablet   Indication:  Moderate to Moderately Severe Pain   Quantity:  90 tablet        Take 1 tablet by mouth every 4 hours as needed. Indications: Moderate to Moderately Severe Pain   Refills:  0        * HYDROcodone-acetaminophen  MG per tablet   Commonly known as:  NORCO   Dose:  1 tablet   Quantity:  15 tablet        Take 1 tablet by mouth every 6 hours as needed for pain.   Refills:  0        hydrOXYzine 25 MG tablet   Commonly known as:  ATARAX   Dose:  25 mg   Quantity:  120 tablet        Take 1 tablet by mouth 4 times daily as needed for itching.   Refills:  0        ibuprofen 600 MG tablet   Commonly known as:  ADVIL/MOTRIN   Dose:  600 mg        Take 600 mg by mouth every 8 hours as needed.   Refills:  0        ketoprofen 10% in PLO 10% topical gel        Apply  topically 4 times daily as needed. To back and knees   Refills:  0        lidocaine 5 % Patch   Commonly known as:  LIDODERM   Dose:  1-3 patch        Place 1-3 patches onto the skin every 24 hours. Apply 1 patch to skin. Wear for 12 hours and remove for 12 hrs   Refills:  0        OMEPRAZOLE PO        Take  by mouth.   Refills:  0        QUEtiapine 50 MG tablet   Commonly known as:  SEROquel   Dose:  50 mg   Quantity:  30 tablet         Take 1 tablet by mouth every 4 hours as needed (racing thoughts, anxiety).   Refills:  1        rOPINIRole 0.25 MG tablet   Commonly known as:  REQUIP   Dose:  0.25 mg   Indication:  Restless Leg Syndrome   Quantity:  60 tablet        Take 1 tablet by mouth 2 times daily. Indications: Restless Leg Syndrome   Refills:  0        simvastatin 10 MG tablet   Commonly known as:  ZOCOR   Dose:  10 mg   Quantity:  30 tablet        Take 1 tablet by mouth every evening.   Refills:  0        SUMAtriptan 50 MG tablet   Commonly known as:  IMITREX   Dose:  50 mg        Take 50 mg by mouth at onset of headache. May repeat in 2 hours if needed: max 2/day; average number of headaches monthly ***; Per [facility] [place], this is a current medication   Refills:  0        traZODone 50 MG tablet   Commonly known as:  DESYREL   Dose:  50 mg   Quantity:  30 tablet        Take 1 tablet by mouth nightly as needed for sleep.   Refills:  0        * Notice:  This list has 2 medication(s) that are the same as other medications prescribed for you. Read the directions carefully, and ask your doctor or other care provider to review them with you.            Prescriptions were sent or printed at these locations (5 Prescriptions)                   Other Prescriptions                Printed at Department/Unit printer (5 of 5)         cephALEXin (KEFLEX) 500 MG capsule               cephALEXin (KEFLEX) 500 MG capsule               dicyclomine (BENTYL) 20 MG tablet               dicyclomine (BENTYL) 20 MG tablet               ondansetron (ZOFRAN ODT) 4 MG ODT tab                Procedures and tests performed during your visit     CBC + differential    Comprehensive metabolic panel    D dimer quantitative    EKG 12 lead    Lipase    Orthostatic blood pressure and pulse    POC US ECHO LIMITED    TSH with free T4 reflex    Troponin I      Orders Needing Specimen Collection     None      Pending Results     No orders found from  10/1/2018 to 10/4/2018.            Pending Culture Results     No orders found from 10/1/2018 to 10/4/2018.            Pending Results Instructions     If you had any lab results that were not finalized at the time of your Discharge, you can call the ED Lab Result RN at [phone]. You will be contacted by this team for any positive Lab results or changes in treatment. The nurses are available 7 days a week from 10A to 6:30P.  You can leave a message 24 hours per day and they will return your call.        Test Results From Your Hospital Stay        10/3/2018  2:01 AM      Component Results     Component Value Ref Range & Units Status    WBC 9.5 4.0 - 11.0 10e9/L Final    RBC Count 3.84 3.8 - 5.2 10e12/L Final    Hemoglobin 11.8 11.7 - 15.7 g/dL Final    Hematocrit 37.0 35.0 - 47.0 % Final    MCV 96 78 - 100 fl Final    MCH 30.7 26.5 - 33.0 pg Final    MCHC 31.9 31.5 - 36.5 g/dL Final    RDW 13.2 10.0 - 15.0 % Final    Platelet Count 249 150 - 450 10e9/L Final    Diff Method Automated Method  Final    % Neutrophils 58.3 % Final    % Lymphocytes 33.1 % Final    % Monocytes 6.4 % Final    % Eosinophils 1.5 % Final    % Basophils 0.6 % Final    % Immature Granulocytes 0.1 % Final    Nucleated RBCs 0 0 /100 Final    Absolute Neutrophil 5.5 1.6 - 8.3 10e9/L Final    Absolute Lymphocytes 3.1 0.8 - 5.3 10e9/L Final    Absolute Monocytes 0.6 0.0 - 1.3 10e9/L Final    Absolute Eosinophils 0.1 0.0 - 0.7 10e9/L Final    Absolute Basophils 0.1 0.0 - 0.2 10e9/L Final    Abs Immature Granulocytes 0.0 0 - 0.4 10e9/L Final    Absolute Nucleated RBC 0.0  Final         10/3/2018  2:25 AM      Component Results     Component Value Ref Range & Units Status    Sodium 138 133 - 144 mmol/L Final    Potassium 3.4 3.4 - 5.3 mmol/L Final    Chloride 108 94 - 109 mmol/L Final    Carbon Dioxide 18 (L) 20 - 32 mmol/L Final    Anion Gap 12 3 - 14 mmol/L Final    Glucose 98 70 - 99 mg/dL Final    Urea Nitrogen 12 7 - 30 mg/dL Final    Creatinine  0.84 0.52 - 1.04 mg/dL Final    GFR Estimate 68 >60 mL/min/1.7m2 Final    Non  GFR Calc    GFR Estimate If Black 83 >60 mL/min/1.7m2 Final    African American GFR Calc    Calcium 8.2 (L) 8.5 - 10.1 mg/dL Final    Bilirubin Total 0.6 0.2 - 1.3 mg/dL Final    Albumin 3.7 3.4 - 5.0 g/dL Final    Protein Total 7.5 6.8 - 8.8 g/dL Final    Alkaline Phosphatase 99 40 - 150 U/L Final    ALT 21 0 - 50 U/L Final    AST 15 0 - 45 U/L Final         10/3/2018  2:25 AM      Component Results     Component Value Ref Range & Units Status    Lipase 282 73 - 393 U/L Final         10/3/2018  2:25 AM      Component Results     Component Value Ref Range & Units Status    Troponin I ES <0.015 0.000 - 0.045 ug/L Final    The 99th percentile for upper reference range is 0.045 ug/L.  Troponin values   in the range of 0.045 - 0.120 ug/L may be associated with risks of adverse   clinical events.           10/3/2018  6:38 AM      Impression     PROCEDURE NOTE: ED BEDSIDE LIMITED ULTRASOUND  Name of the study: Limited Echo  Performed by: Dr. Cagle  Indications: Tachycardia  Body Location / Organs Imaged: Multiple planes of the heart, bilateral lungs.  Findings: Hyperdynamic LV with IVC 1.9 cm.  Tachycardic.   Interpretation: Tachycardia with US suggestive of euvolemia.   Archiving of images: Images were also saved digitally to the internal hard drive of the ultrasound machine.             10/3/2018  7:01 AM      Component Results     Component Value Ref Range & Units Status    TSH 2.23 0.40 - 4.00 mU/L Final         10/3/2018  7:29 AM      Component Results     Component Value Ref Range & Units Status    D Dimer 0.5 0.0 - 0.50 ug/ml FEU Final    This D-dimer assay is intended for use in conjunction with a clinical pretest   probability assessment model to exclude pulmonary embolism (PE) and deep   venous thrombosis (DVT) in outpatients suspected of PE or DVT. The cut-off   value is 0.5 ug/mL FEU.                  Clinical  Quality Measure: Blood Pressure Screening     Your blood pressure was checked while you were in the emergency department today. The last reading we obtained was  BP: 131/66 . Please read the guidelines below about what these numbers mean and what you should do about them.  If your systolic blood pressure (the top number) is less than 120 and your diastolic blood pressure (the bottom number) is less than 80, then your blood pressure is normal. There is nothing more that you need to do about it.  If your systolic blood pressure (the top number) is 120-139 or your diastolic blood pressure (the bottom number) is 80-89, your blood pressure may be higher than it should be. You should have your blood pressure rechecked within a year by a primary care provider.  If your systolic blood pressure (the top number) is 140 or greater or your diastolic blood pressure (the bottom number) is 90 or greater, you may have high blood pressure. High blood pressure is treatable, but if left untreated over time it can put you at risk for heart attack, stroke, or kidney failure. You should have your blood pressure rechecked by a primary care provider within the next 4 weeks.  If your provider in the emergency department today gave you specific instructions to follow-up with your doctor or provider even sooner than that, you should follow that instruction and not wait for up to 4 weeks for your follow-up visit.        Thank you for choosing [facility]       Thank you for choosing [facility] for your care. Our goal is always to provide you with excellent care. Hearing back from our patients is one way we can continue to improve our services. Please take a few minutes to complete the written survey that you may receive in the mail after you visit with us. Thank you!        [employer] Information     "[employer] lets you send messages to your doctor, view your test results, renew your prescriptions, schedule appointments and more. To sign up, go to  [email or web address] . Click on \"Log in\" on the left side of the screen, which will take you to the Welcome page. Then click on \"Sign up Now\" on the right side of the page.     You will be asked to enter the access code listed below, as well as some personal information. Please follow the directions to create your username and password.     Your access code is: [email or web address]  Expires: 2019  3:02 AM     Your access code will  in 90 days. If you need help or a new code, please call your [facility] clinic or [phone].        Care EveryWhere ID     This is your Care EveryWhere ID. This could be used by other organizations to access your [facility] medical records  [id number]        Equal Access to Services     PARIS LARA : Greg Baird, eric cat, shauna ward, lina harrington. So [facility] [phone].    ATENCIÓN: Si habla español, tiene a lai disposición servicios gratuitos de asistencia lingüística. Llame al [phone].    We comply with applicable federal civil rights laws and [place] laws. We do not discriminate on the basis of race, color, national origin, age, disability, sex, sexual orientation, or gender identity.            After Visit Summary       This is your record. Keep this with you and show to your community pharmacist(s) and doctor(s) at your next visit.                  "

## 2018-10-03 NOTE — ED TRIAGE NOTES
Pt was discharged this am with dx of diarrhea, cellullitis and tachycardia.  Pt has been sitting in the lobby trying to feel well enough to leave and check into her hotel.  She still feels dehydrated and having severe restless leg pain.

## 2018-10-04 ENCOUNTER — OFFICE VISIT (OUTPATIENT)
Dept: INTERNAL MEDICINE | Facility: CLINIC | Age: 63
End: 2018-10-04
Payer: MEDICAID

## 2018-10-04 ENCOUNTER — TELEPHONE (OUTPATIENT)
Dept: PALLIATIVE MEDICINE | Facility: CLINIC | Age: 63
End: 2018-10-04

## 2018-10-04 VITALS
BODY MASS INDEX: 30.62 KG/M2 | SYSTOLIC BLOOD PRESSURE: 136 MMHG | DIASTOLIC BLOOD PRESSURE: 74 MMHG | TEMPERATURE: 98.7 F | OXYGEN SATURATION: 100 % | HEART RATE: 110 BPM | HEIGHT: 63 IN | WEIGHT: 172.8 LBS | RESPIRATION RATE: 16 BRPM

## 2018-10-04 DIAGNOSIS — G89.4 CHRONIC PAIN SYNDROME: Primary | ICD-10-CM

## 2018-10-04 PROCEDURE — 99202 OFFICE O/P NEW SF 15 MIN: CPT | Performed by: NURSE PRACTITIONER

## 2018-10-04 RX ORDER — CARISOPRODOL 250 MG/1
250 TABLET ORAL 4 TIMES DAILY PRN
Qty: 120 TABLET | Refills: 0 | Status: ON HOLD | OUTPATIENT
Start: 2018-10-04 | End: 2018-12-05

## 2018-10-04 RX ORDER — OXYCODONE AND ACETAMINOPHEN 5; 325 MG/1; MG/1
TABLET ORAL
Qty: 180 TABLET | Refills: 0 | Status: SHIPPED | OUTPATIENT
Start: 2018-10-04 | End: 2018-11-01

## 2018-10-04 RX ORDER — SUMATRIPTAN 50 MG/1
50 TABLET, FILM COATED ORAL
Qty: 30 TABLET | Refills: 0 | Status: SHIPPED | OUTPATIENT
Start: 2018-10-04 | End: 2018-11-08

## 2018-10-04 NOTE — DISCHARGE INSTRUCTIONS
Continue taking the keflex that was given to you earlier today    Follow up with primary care provider        Diet for Vomiting or Diarrhea (Adult)    Your symptoms may return or get worse after eating certain foods listed below. If this happens, stop eating these foods until your symptoms ease and you feel better.  Once the vomiting stops, follow the steps below.   During the first 12 to 24 hours  During the first 12 to 24 hours, follow this diet:    Drinks. Plain water, sport drinks like electrolyte solutions, soft drinks without caffeine, mineral water (plain or flavored), clear fruit juices, and decaffeinated tea and coffee.    Soups. Clear broth.    Desserts. Plain gelatin, popsicles, and fruit juice bars. As you feel better, you may add 6 to 8 ounces of yogurt per day. If you have diarrhea, don't have foods or drinks that contain sugar, high-fructose corn syrup, or sugar alcohols.  During the next 24 hours  During the next 24 hours you may add the following to the above:    Hot cereal, plain toast, bread, rolls, and crackers    Plain noodles, rice, mashed potatoes, and chicken noodle or rice soup    Unsweetened canned fruit (but not pineapple) and bananas  Don't eat more than 15 grams of fat a day. Do this by staying away from margarine, butter, oils, mayonnaise, sauces, gravies, fried foods, peanut butter, meat, poultry, and fish.  Don't eat much fiber. Stay away from raw or cooked vegetables, fresh fruits (except bananas), and bran cereals.  Limit how much caffeine and chocolate you have. Do not use any spices or seasonings except salt.  During the next 24 hours  Slowly go back to your normal diet, as you feel better and your symptoms ease.  Date Last Reviewed: 8/1/2016 2000-2017 [employer]. [address] [place] [address], [place] [address]. All rights reserved. This information is not intended as a substitute for professional medical care. Always follow your healthcare professional's  instructions.

## 2018-10-04 NOTE — LETTER
October 10, 2018    Lilliana Cardenas  [address]   [facility] [address]    Dear Lilliana,       Welcome to the [facility].  We are located at [address] [place] [address]. Your appointment at the [facility] has been scheduled on Tuesday October 23, 2018 at 3:00p with Maximiliano Sanchez MD .    At your first visit, you will meet your team of caregivers who will help you to develop pain management strategies that will last a lifetime. You will meet with our support staff to review your insurance information, and collect your co-payment if required by your insurance company. You will also meet with a medical pain specialist and care coordinator who will assess your pain and develop a plan of care for your successful pain rehabilitation. You should expect to spend 1-2 hours at your first visit with us. Usually, patients work with us for a period of 6-12 months, and eventually return to their primary doctor once their pain management has stabilized.      To help us make your visit go as smoothly as possible, please bring the following items with you on your visit:     Completed Pain Questionnaire enclosed in this packet.  If you do not bring the completed questionnaire, we may have to reschedule your appointment.  List of any medicines that you are currently taking or have been prescribed  Important NON-[facility] medical information such as medical records or tests results (X-rays, or laboratory tests)  Your health insurance card  Financial resources to cover your co-payment or balance due at the time of service (cash, personal check, Visa, and MasterCard are acceptable methods of payment)     Due to the demand for new patient evaluations, you must notify the scheduling department 48 hours in advance if you are not able to keep this appointment. Failure to do so could affect your ability to reschedule with our clinic. Please be aware that we will not  prescribe any medications at your first visit.     Please call [phone] with any questions regarding your appointment. We look forward to meeting you and working to address your health care needs.     Sincerely,    [facility]

## 2018-10-04 NOTE — MR AVS SNAPSHOT
After Visit Summary   10/4/2018    Lilliana Cardenas    MRN: [id number]           Patient Information     Date Of Birth          1955        Visit Information        Provider Department      10/4/2018 1:00 PM Bibi Chau NP [facility]        Today's Diagnoses     Chronic pain syndrome    -  1       Follow-ups after your visit        Additional Services     PAIN MANAGEMENT REFERRAL       Your provider has referred you to: [facility] -    Reason for Referral: Evaluation for comprehensive services- patients will be evaluated if appropriate for comprehensive service including medication changes, procedures, pain psychology, and pain physical therapy.  While involved with comprehensive services, pain providers will work with referring provider/PCP to stabilize appropriate medication management, with long-term plan of transition of prescribing back to referring provider/PCP upon completion of comprehensive services.      Please complete the following questions:    Do you have any specific questions for the pain specialist? No    Are there any red flags that may impact the assessment or management of the patient? None      What is your diagnosis for the patient's pain? Back pain      For any questions, contact the [facility] at [phone].     **ANY DIAGNOSTIC TESTS THAT ARE NOT IN EPIC SHOULD BE SENT TO THE PAIN CENTER**    REGARDING OPIOID MEDICATIONS:  The discussion of opioids management, appropriateness of therapy, and dosing will be discussed in patients being seen for evaluation.  The pain management clinics are not long-term prescribing clinics, with transition of prescribing of medications ultimately going back to the referring provider/PCP.  If prescribing is taken over at the pain clinic, it is in actively involved patients whom are appropriate for opioids, urine drug screening is completed, and long-term  "prescribing plan has been determined.  Therefore, we will not be automatically taking over prescribing at the patient's first visit.  Is this agreeable to you? agrees.     Please be aware that coverage of these services is subject to the terms and limitations of your health insurance plan.  Call member services at your health plan with any benefit or coverage questions.      Please bring the following with you to your appointment:    (1) Any X-Rays, CTs or MRIs which have been performed.  Contact the facility where they were done to arrange for  prior to your scheduled appointment.    (2) List of current medications   (3) This referral request   (4) Any documents/labs given to you for this referral                  Who to contact     If you have questions or need follow up information about today's clinic visit or your schedule please contact [facility] directly at [phone].  Normal or non-critical lab and imaging results will be communicated to you by ContaAzulhart, letter or phone within 4 business days after the clinic has received the results. If you do not hear from us within 7 days, please contact the clinic through ContaAzulhart or phone. If you have a critical or abnormal lab result, we will notify you by phone as soon as possible.  Submit refill requests through Squirrly or call your pharmacy and they will forward the refill request to us. Please allow 3 business days for your refill to be completed.          Additional Information About Your Visit        Squirrly Information     Squirrly lets you send messages to your doctor, view your test results, renew your prescriptions, schedule appointments and more. To sign up, go to [email or web address] . Click on \"Log in\" on the left side of the screen, which will take you to the Welcome page. Then click on \"Sign up Now\" on the right side of the page.     You will be asked to enter the access code listed below, as well as some personal " "information. Please follow the directions to create your username and password.     Your access code is: [email or web address]  Expires: 2019  3:02 AM     Your access code will  in 90 days. If you need help or a new code, please call your [facility] clinic or [phone].        Care EveryWhere ID     This is your Care EveryWhere ID. This could be used by other organizations to access your [facility] medical records  [id number]        Your Vitals Were     Pulse Temperature Respirations Height Pulse Oximetry BMI (Body Mass Index)    110 98.7  F (37.1  C) (Oral) 16 5' 3\" (1.6 m) 100% 30.61 kg/m2       Blood Pressure from Last 3 Encounters:   10/04/18 136/74   10/03/18 117/71   10/03/18 131/66    Weight from Last 3 Encounters:   10/04/18 172 lb 12.8 oz (78.4 kg)   10/03/18 170 lb (77.1 kg)   12 177 lb (80.3 kg)              We Performed the Following     PAIN MANAGEMENT REFERRAL          Today's Medication Changes          These changes are accurate as of 10/4/18  1:57 PM.  If you have any questions, ask your nurse or doctor.               Start taking these medicines.        Dose/Directions    oxyCODONE-acetaminophen 5-325 MG per tablet   Commonly known as:  PERCOCET   Used for:  Chronic pain syndrome   Started by:  Bibi Chau NP        1-2 tabs every 6 hours as needed for moderate to severe pain   Quantity:  180 tablet   Refills:  0         These medicines have changed or have updated prescriptions.        Dose/Directions    * CARISOPRODOL PO   This may have changed:  Another medication with the same name was added. Make sure you understand how and when to take each.   Changed by:  Bibi Chau NP        Take  by mouth.   Refills:  0       * Carisoprodol 250 MG Tabs   This may have changed:  You were already taking a medication with the same name, and this prescription was added. Make sure you understand how and when to take each.   Used for:  Chronic pain syndrome   Changed by:  Kandi" Bibi Santoyo NP        Dose:  250 mg   Take 250 mg by mouth 4 times daily as needed   Quantity:  120 tablet   Refills:  0       HYDROcodone-acetaminophen  MG per tablet   Commonly known as:  NORCO   This may have changed:  Another medication with the same name was removed. Continue taking this medication, and follow the directions you see here.   Changed by:  Bibi Chau NP        Dose:  1 tablet   Take 1 tablet by mouth every 6 hours as needed for pain.   Quantity:  15 tablet   Refills:  0       SUMAtriptan 50 MG tablet   Commonly known as:  IMITREX   This may have changed:  additional instructions   Used for:  Chronic pain syndrome   Changed by:  Bibi Chau NP        Dose:  50 mg   Take 1 tablet (50 mg) by mouth at onset of headache May repeat in 2 hours if needed: max 2/day; average number of headaches monthly 4; Per [facility], this is a current medication   Quantity:  30 tablet   Refills:  0       * Notice:  This list has 2 medication(s) that are the same as other medications prescribed for you. Read the directions carefully, and ask your doctor or other care provider to review them with you.         Where to get your medicines      Some of these will need a paper prescription and others can be bought over the counter.  Ask your nurse if you have questions.     Bring a paper prescription for each of these medications     Carisoprodol 250 MG Tabs    oxyCODONE-acetaminophen 5-325 MG per tablet    SUMAtriptan 50 MG tablet               Information about OPIOIDS     PRESCRIPTION OPIOIDS: WHAT YOU NEED TO KNOW   We gave you an opioid (narcotic) pain medicine. It is important to manage your pain, but opioids are not always the best choice. You should first try all the other options your care team gave you. Take this medicine for as short a time (and as few doses) as possible.    Some activities can increase your pain, such as bandage changes or therapy sessions. It  may help to take your pain medicine 30 to 60 minutes before these activities. Reduce your stress by getting enough sleep, working on hobbies you enjoy and practicing relaxation or meditation. Talk to your care team about ways to manage your pain beyond prescription opioids.    These medicines have risks:    DO NOT drive when on new or higher doses of pain medicine. These medicines can affect your alertness and reaction times, and you could be arrested for driving under the influence (DUI). If you need to use opioids long-term, talk to your care team about driving.    DO NOT operate heavy machinery    DO NOT do any other dangerous activities while taking these medicines.    DO NOT drink any alcohol while taking these medicines.     If the opioid prescribed includes acetaminophen, DO NOT take with any other medicines that contain acetaminophen. Read all labels carefully. Look for the word  acetaminophen  or  Tylenol.  Ask your pharmacist if you have questions or are unsure.    You can get addicted to pain medicines, especially if you have a history of addiction (chemical, alcohol or substance dependence). Talk to your care team about ways to reduce this risk.    All opioids tend to cause constipation. Drink plenty of water and eat foods that have a lot of fiber, such as fruits, vegetables, prune juice, apple juice and high-fiber cereal. Take a laxative (Miralax, milk of magnesia, Colace, Senna) if you don t move your bowels at least every other day. Other side effects include upset stomach, sleepiness, dizziness, throwing up, tolerance (needing more of the medicine to have the same effect), physical dependence and slowed breathing.    Store your pills in a secure place, locked if possible. We will not replace any lost or stolen medicine. If you don t finish your medicine, please throw away (dispose) as directed by your pharmacist. The [place] Pollution Control Agency has more information about safe disposal:  [email or web address]         Primary Care Provider Office Phone # Fax #    Uzair Poe [phone]       [facility] [address]  [place] [address]        Equal Access to Services     MARLENJENNIFER MARCO : Hadii aad ku hadasho Soomaali, waaxda luqadaha, qaybta kaalmada adeegyada, waxay idiin hayaan adeeg kharash la'aan . So [facility] [phone].    ATENCIÓN: Si habla español, tiene a lai disposición servicios gratuitos de asistencia lingüística. Sridevi al [phone].    We comply with applicable federal civil rights laws and [place] laws. We do not discriminate on the basis of race, color, national origin, age, disability, sex, sexual orientation, or gender identity.            Thank you!     Thank you for choosing [facility]  for your care. Our goal is always to provide you with excellent care. Hearing back from our patients is one way we can continue to improve our services. Please take a few minutes to complete the written survey that you may receive in the mail after your visit with us. Thank you!             Your Updated Medication List - Protect others around you: Learn how to safely use, store and throw away your medicines at www.disposemymeds.org.          This list is accurate as of 10/4/18  1:57 PM.  Always use your most recent med list.                   Brand Name Dispense Instructions for use Diagnosis    * CARISOPRODOL PO      Take  by mouth.        * Carisoprodol 250 MG Tabs     120 tablet    Take 250 mg by mouth 4 times daily as needed    Chronic pain syndrome       cephALEXin 500 MG capsule    KEFLEX    28 capsule    Take 1 capsule (500 mg) by mouth 4 times daily for 7 days        CITALOPRAM HYDROBROMIDE PO      Take  by mouth.        dicyclomine 20 MG tablet    BENTYL    20 tablet    Take 1 tablet (20 mg) by mouth 2 times daily for 10 days        FLUoxetine 20 MG capsule    PROzac    30 capsule    Take  1 capsule by mouth every morning.    Moderate major depression (H)       fluticasone 50 MCG/ACT spray    FLONASE    1 Package    2 sprays by Both Nostrils route daily.    Nasal congestion       gabapentin 800 MG tablet    NEURONTIN    90 tablet    Take 1 tablet by mouth 3 times daily.    Chronic pain syndrome       HYDROcodone-acetaminophen  MG per tablet    NORCO    15 tablet    Take 1 tablet by mouth every 6 hours as needed for pain.        hydrOXYzine 25 MG tablet    ATARAX    120 tablet    Take 1 tablet by mouth 4 times daily as needed for itching.    Moderate major depression (H)       ketoprofen 10% in PLO 10% topical gel      Apply  topically 4 times daily as needed. To back and knees        OMEPRAZOLE PO      Take  by mouth.        ondansetron 4 MG ODT tab    ZOFRAN ODT    10 tablet    Take 1 tablet (4 mg) by mouth every 8 hours as needed for nausea        oxyCODONE-acetaminophen 5-325 MG per tablet    PERCOCET    180 tablet    1-2 tabs every 6 hours as needed for moderate to severe pain    Chronic pain syndrome       QUEtiapine 50 MG tablet    SEROquel    30 tablet    Take 1 tablet by mouth every 4 hours as needed (racing thoughts, anxiety).    Moderate major depression (H)       rOPINIRole 0.25 MG tablet    REQUIP    42 tablet    Take 3 tablets (0.75 mg) by mouth At Bedtime for 14 days        SUMAtriptan 50 MG tablet    IMITREX    30 tablet    Take 1 tablet (50 mg) by mouth at onset of headache May repeat in 2 hours if needed: max 2/day; average number of headaches monthly 4; Per [facility], this is a current medication    Chronic pain syndrome       traZODone 50 MG tablet    DESYREL    30 tablet    Take 1 tablet by mouth nightly as needed for sleep.    Moderate major depression (H)       * Notice:  This list has 2 medication(s) that are the same as other medications prescribed for you. Read the directions carefully, and ask your doctor or other care provider to review them  with you.

## 2018-10-04 NOTE — PROGRESS NOTES
SUBJECTIVE:   Lilliana Cardenas is a 63 year old female who presents to clinic today for the following health issues:      ED/UC Followup:    Facility:  [facility]  Date of visit: 10/03/18  Reason for visit: diarrhea  Current Status: stable.  Needs Rx pain meds, is moving to [place] and needs to establish PCP.             Problem list and histories reviewed & adjusted, as indicated.  Additional history: as documented    Patient Active Problem List   Diagnosis     Chronic pain syndrome     Chronic low back pain     Opiate dependence (H)     Overdose     Past Surgical History:   Procedure Laterality Date     GYN SURGERY         Social History   Substance Use Topics     Smoking status: Former Smoker     Packs/day: 1.00     Years: 38.00     Smokeless tobacco: Never Used      Comment: pt states she has smoked on & off for since she was 18     Alcohol use No     History reviewed. No pertinent family history.      Current Outpatient Prescriptions   Medication Sig Dispense Refill     Carisoprodol 250 MG TABS Take 250 mg by mouth 4 times daily as needed 120 tablet 0     CARISOPRODOL PO Take  by mouth.       cephALEXin (KEFLEX) 500 MG capsule Take 1 capsule (500 mg) by mouth 4 times daily for 7 days 28 capsule 0     dicyclomine (BENTYL) 20 MG tablet Take 1 tablet (20 mg) by mouth 2 times daily for 10 days 20 tablet 0     FLUoxetine (PROZAC) 20 MG capsule Take 1 capsule by mouth every morning. 30 capsule 0     fluticasone (FLONASE) 50 MCG/ACT nasal spray 2 sprays by Both Nostrils route daily. 1 Package 0     gabapentin (NEURONTIN) 800 MG tablet Take 1 tablet by mouth 3 times daily. 90 tablet 0     HYDROcodone-acetaminophen  MG per tablet Take 1 tablet by mouth every 6 hours as needed for pain. 15 tablet 0     hydrOXYzine (ATARAX) 25 MG tablet Take 1 tablet by mouth 4 times daily as needed for itching. 120 tablet 0     ketoprofen 10% in PLO 10% Apply  topically 4 times daily as needed. To back and knees        "OMEPRAZOLE PO Take  by mouth.       ondansetron (ZOFRAN ODT) 4 MG ODT tab Take 1 tablet (4 mg) by mouth every 8 hours as needed for nausea 10 tablet 0     oxyCODONE-acetaminophen (PERCOCET) 5-325 MG per tablet 1-2 tabs every 6 hours as needed for moderate to severe pain 180 tablet 0     quetiapine (SEROQUEL) 50 MG tablet Take 1 tablet by mouth every 4 hours as needed (racing thoughts, anxiety). 30 tablet 1     rOPINIRole (REQUIP) 0.25 MG tablet Take 3 tablets (0.75 mg) by mouth At Bedtime for 14 days 42 tablet 0     SUMAtriptan (IMITREX) 50 MG tablet Take 1 tablet (50 mg) by mouth at onset of headache May repeat in 2 hours if needed: max 2/day; average number of headaches monthly 4; Per [facility], this is a current medication 30 tablet 0     TRAZodone (DESYREL) 50 MG tablet Take 1 tablet by mouth nightly as needed for sleep. 30 tablet 0     CITALOPRAM HYDROBROMIDE PO Take  by mouth.       BP Readings from Last 3 Encounters:   10/04/18 136/74   10/03/18 117/71   10/03/18 131/66    Wt Readings from Last 3 Encounters:   10/04/18 172 lb 12.8 oz (78.4 kg)   10/03/18 170 lb (77.1 kg)   06/30/12 177 lb (80.3 kg)                    Reviewed and updated as needed this visit by clinical staff  Tobacco  Allergies  Meds  Med Hx  Surg Hx  Fam Hx  Soc Hx      Reviewed and updated as needed this visit by Provider         ROS:  Constitutional, HEENT, cardiovascular, pulmonary, gi and gu systems are negative, except as otherwise noted.    OBJECTIVE:     /74 (BP Location: Right arm, Patient Position: Sitting, Cuff Size: Adult Large)  Pulse 110  Temp 98.7  F (37.1  C) (Oral)  Resp 16  Ht 5' 3\" (1.6 m)  Wt 172 lb 12.8 oz (78.4 kg)  SpO2 100%  BMI 30.61 kg/m2  Body mass index is 30.61 kg/(m^2).  GENERAL: alert, no distress and appears older than stated age  PSYCH: mentation appears normal, tangential and affect flat        ASSESSMENT/PLAN:               ICD-10-CM    1. Chronic pain syndrome " G89.4 oxyCODONE-acetaminophen (PERCOCET) 5-325 MG per tablet     Carisoprodol 250 MG TABS     SUMAtriptan (IMITREX) 50 MG tablet     PAIN MANAGEMENT REFERRAL       One month Rx for controlled substances  Pain clinic consult for ongoing pain management recommendations.    Bibi Chau NP  [facility]

## 2018-10-05 ENCOUNTER — TELEPHONE (OUTPATIENT)
Dept: INTERNAL MEDICINE | Facility: CLINIC | Age: 63
End: 2018-10-05

## 2018-10-05 NOTE — TELEPHONE ENCOUNTER
Pt calls stating she went to ED for diarrhea and is asking what is she supposed to do now. She is still having diarrhea.   She is trying to drink Gatorade. She has had 5 stools since 11 AM.     She saw Bibi Chau NP on 10/4. Went to ED on 10/3.     She is declining to go back to ED, when this Writer advised to her if she is feeling dehydrated still, to return to ED.   Advised that she can go to UC to see if there is any other alternatives she can try for diarrhea. She states she tried Imodium, but not sure how much or for how long. She has been having diarrhea for one week. Not sure if lomotil would be an option for her.   She agrees to go to UC.

## 2018-10-10 NOTE — TELEPHONE ENCOUNTER
Pain Management Center Referral      1. Confirmed address with patient? Yes  2. Confirmed phone number with patient? Yes  3. Confirmed referring provider? Yes  4. Is the PCP the same as the referring provider? Yes  5. Has the patient been to any previous pain clinics? Yes  (If yes, send HEIDI with welcome letter)  6. Which insurance are we to bill for this appointment?  Medicare    7. Informed pt of cancellation (48 hour) policy? Yes    REGARDING OPIOID MEDICATIONS: We will always address appropriateness of opioid pain medications, but we generally will not automatically take on a prescribing role. When we do take on prescribing of opioids for chronic pain, it is in collaboration with the referring physician for an intermediate period of time (months), with an expectation that the primary physician or provider will assume the prescribing role if medications are effective at stable doses with demonstrated compliance. Therefore, please do not assume that your prescribing responsibilities end on the day of pain clinic consultation.  7. Informed pt of prescribing policy? Yes      8. Referring Provider: Uzair Poe/Bibi Chau    9. Criteria for Triage Eval:   -Missed/Failed 1st DUAL appointment? N/A   -Medication Focused? N/A   -Mental Health Concerns? (e.g. Recent psych hospitalization/snap shot)? N/A   -Active substance abuse? N/A   -Patient behaviors (e.g. Offensive language/raised voice)? N/A      Filomena WATSON    [facility]

## 2018-10-15 ENCOUNTER — TELEPHONE (OUTPATIENT)
Dept: INTERNAL MEDICINE | Facility: CLINIC | Age: 63
End: 2018-10-15

## 2018-10-15 DIAGNOSIS — F32.1 MODERATE MAJOR DEPRESSION (H): Primary | ICD-10-CM

## 2018-10-15 DIAGNOSIS — G89.4 CHRONIC PAIN SYNDROME: ICD-10-CM

## 2018-10-15 NOTE — TELEPHONE ENCOUNTER
Pt calls needing refill on Topiranate and Quetiatine and Gabapentin &  Pramitexole & Ropinirole & Trazodone.  Please send to [facility] on [address] in [place]. Please call with questions and ok to leave message. [phone]

## 2018-10-16 ENCOUNTER — TELEPHONE (OUTPATIENT)
Dept: INTERNAL MEDICINE | Facility: CLINIC | Age: 63
End: 2018-10-16

## 2018-10-16 RX ORDER — TOPIRAMATE 25 MG/1
25 TABLET, FILM COATED ORAL DAILY
Qty: 30 TABLET | Refills: 0 | Status: CANCELLED | OUTPATIENT
Start: 2018-10-16

## 2018-10-16 RX ORDER — QUETIAPINE FUMARATE 50 MG/1
50 TABLET, FILM COATED ORAL EVERY 4 HOURS PRN
Qty: 30 TABLET | Refills: 1 | Status: CANCELLED | OUTPATIENT
Start: 2018-10-16

## 2018-10-16 RX ORDER — ROPINIROLE 0.25 MG/1
0.75 TABLET, FILM COATED ORAL AT BEDTIME
Qty: 42 TABLET | Refills: 0 | Status: CANCELLED | OUTPATIENT
Start: 2018-10-16

## 2018-10-16 NOTE — TELEPHONE ENCOUNTER
Last OV on 10/4/18    Refill request for Gabapentin, Seroquel, Trazodone, Topamax, Requip, Pramipexole(not on med list).   .   Last refill on 12/19/11 on Gabapentin, Seroquel, Trazodone, topamax.     Only given 14 days for Requip on 10/3/18.     .

## 2018-10-16 NOTE — TELEPHONE ENCOUNTER
Contacted patient, appointment scheduled for tomorrow.   Next 5 appointments (look out 90 days)     Oct 17, 2018  1:40 PM CDT   SHORT with Bibi Chau NP   [facility]    [address]  [facility] [id number] [phone]

## 2018-10-16 NOTE — TELEPHONE ENCOUNTER
Reason for Call:  Same Day Appointment, Requested Provider:  Bibi Chau    PCP: Uzair Poe    Reason for visit: Patient still having diarrhea, would like to discuss med refills, see previous encounter.     Duration of symptoms: diarrhea x 2 weeks    Have you been treated for this in the past? Yes    Additional comments: please call patient if able to get her in with Kandi.    Can we leave a detailed message on this number? YES    Phone number patient can be reached at: Cell number on file:    Telephone Information:   Mobile [phone]       Best Time: any    Call taken on 10/16/2018 at 4:41 PM by Adriana Alvarado

## 2018-10-17 ENCOUNTER — OFFICE VISIT (OUTPATIENT)
Dept: INTERNAL MEDICINE | Facility: CLINIC | Age: 63
End: 2018-10-17
Payer: MEDICAID

## 2018-10-17 VITALS
OXYGEN SATURATION: 100 % | DIASTOLIC BLOOD PRESSURE: 82 MMHG | HEIGHT: 63 IN | BODY MASS INDEX: 29.23 KG/M2 | HEART RATE: 120 BPM | TEMPERATURE: 99.1 F | RESPIRATION RATE: 16 BRPM | SYSTOLIC BLOOD PRESSURE: 124 MMHG | WEIGHT: 165 LBS

## 2018-10-17 DIAGNOSIS — F32.1 MODERATE MAJOR DEPRESSION (H): Primary | ICD-10-CM

## 2018-10-17 DIAGNOSIS — G89.4 CHRONIC PAIN SYNDROME: ICD-10-CM

## 2018-10-17 PROCEDURE — 99214 OFFICE O/P EST MOD 30 MIN: CPT | Performed by: NURSE PRACTITIONER

## 2018-10-17 RX ORDER — TRAZODONE HYDROCHLORIDE 50 MG/1
50 TABLET, FILM COATED ORAL
Qty: 30 TABLET | Refills: 0 | Status: SHIPPED | OUTPATIENT
Start: 2018-10-17 | End: 2018-11-15

## 2018-10-17 RX ORDER — GABAPENTIN 800 MG/1
800 TABLET ORAL 3 TIMES DAILY
Qty: 90 TABLET | Refills: 0 | Status: SHIPPED | OUTPATIENT
Start: 2018-10-17 | End: 2018-11-15

## 2018-10-17 RX ORDER — CITALOPRAM HYDROBROMIDE 20 MG/1
20 TABLET ORAL DAILY
Qty: 90 TABLET | Refills: 3 | Status: ON HOLD | OUTPATIENT
Start: 2018-10-17 | End: 2018-11-29

## 2018-10-17 ASSESSMENT — ANXIETY QUESTIONNAIRES
1. FEELING NERVOUS, ANXIOUS, OR ON EDGE: NEARLY EVERY DAY
7. FEELING AFRAID AS IF SOMETHING AWFUL MIGHT HAPPEN: NOT AT ALL
6. BECOMING EASILY ANNOYED OR IRRITABLE: MORE THAN HALF THE DAYS
IF YOU CHECKED OFF ANY PROBLEMS ON THIS QUESTIONNAIRE, HOW DIFFICULT HAVE THESE PROBLEMS MADE IT FOR YOU TO DO YOUR WORK, TAKE CARE OF THINGS AT HOME, OR GET ALONG WITH OTHER PEOPLE: VERY DIFFICULT
3. WORRYING TOO MUCH ABOUT DIFFERENT THINGS: MORE THAN HALF THE DAYS
5. BEING SO RESTLESS THAT IT IS HARD TO SIT STILL: MORE THAN HALF THE DAYS
GAD7 TOTAL SCORE: 14
2. NOT BEING ABLE TO STOP OR CONTROL WORRYING: MORE THAN HALF THE DAYS

## 2018-10-17 ASSESSMENT — PATIENT HEALTH QUESTIONNAIRE - PHQ9: 5. POOR APPETITE OR OVEREATING: NEARLY EVERY DAY

## 2018-10-17 NOTE — MR AVS SNAPSHOT
"              After Visit Summary   10/17/2018    Lilliana Cardenas    MRN: [id number]           Patient Information     Date Of Birth          1955        Visit Information        Provider Department      10/17/2018 1:40 PM Bibi Chau NP [facility]        Today's Diagnoses     Moderate major depression (H)    -  1    Chronic pain syndrome           Follow-ups after your visit        Follow-up notes from your care team     Return in about 3 months (around 1/17/2019).      Your next 10 appointments already scheduled     Oct 23, 2018  3:00 PM CDT   New Visit with Maximiliano Sanchez MD   [facility] ([facility]    [address] [address] [phone]              Who to contact     If you have questions or need follow up information about today's clinic visit or your schedule please contact [facility] directly at [phone].  Normal or non-critical lab and imaging results will be communicated to you by MyChart, letter or phone within 4 business days after the clinic has received the results. If you do not hear from us within 7 days, please contact the clinic through [facility] or phone. If you have a critical or abnormal lab result, we will notify you by phone as soon as possible.  Submit refill requests through [employer] or call your pharmacy and they will forward the refill request to us. Please allow 3 business days for your refill to be completed.          Additional Information About Your Visit        MyChart Information     [employer] lets you send messages to your doctor, view your test results, renew your prescriptions, schedule appointments and more. To sign up, go to [email or web address] . Click on \"Log in\" on the left side of the screen, which will take you to the Welcome page. Then click on \"Sign up Now\" on the right side of the page.     You will be asked to enter the access code listed below, as " "well as some personal information. Please follow the directions to create your username and password.     Your access code is: [email or web address]  Expires: 2019  3:02 AM     Your access code will  in 90 days. If you need help or a new code, please call your [facility] clinic or [phone].        Care EveryWhere ID     This is your Care EveryWhere ID. This could be used by other organizations to access your [facility] medical records  [id number]        Your Vitals Were     Pulse Temperature Respirations Height Pulse Oximetry BMI (Body Mass Index)    120 99.1  F (37.3  C) (Oral) 16 5' 3\" (1.6 m) 100% 29.23 kg/m2       Blood Pressure from Last 3 Encounters:   10/17/18 124/82   10/04/18 136/74   10/03/18 117/71    Weight from Last 3 Encounters:   10/17/18 165 lb (74.8 kg)   10/04/18 172 lb 12.8 oz (78.4 kg)   10/03/18 170 lb (77.1 kg)              Today, you had the following     No orders found for display       Primary Care Provider Office Phone # Fax #    Uzair Poe [phone]       [facility] [address]  [place] [address]        Equal Access to Services     [facility] : Hadii aad ku hadasho Soomaali, waaxda luqadaha, qaybta kaalmada adeegyada, waxay idiin hayaan tiff wu . So [facility] [phone].    ATENCIÓN: Si habla español, tiene a lai disposición servicios gratuitos de asistencia lingüística. Llame al [phone].    We comply with applicable federal civil rights laws and [place] laws. We do not discriminate on the basis of race, color, national origin, age, disability, sex, sexual orientation, or gender identity.            Thank you!     Thank you for choosing [facility]  for your care. Our goal is always to provide you with excellent care. Hearing back from our patients is one way we can continue to improve our services. Please take a few minutes to complete the written survey that you may receive in the mail after your visit with " us. Thank you!             Your Updated Medication List - Protect others around you: Learn how to safely use, store and throw away your medicines at www.disposemymeds.org.          This list is accurate as of 10/17/18  1:59 PM.  Always use your most recent med list.                   Brand Name Dispense Instructions for use Diagnosis    Carisoprodol 250 MG Tabs     120 tablet    Take 250 mg by mouth 4 times daily as needed    Chronic pain syndrome       * CITALOPRAM HYDROBROMIDE PO      Take  by mouth.        * citalopram 20 MG tablet    celeXA    90 tablet    Take 1 tablet (20 mg) by mouth daily    Moderate major depression (H)       FLUoxetine 20 MG capsule    PROzac    30 capsule    Take 1 capsule by mouth every morning.    Moderate major depression (H)       fluticasone 50 MCG/ACT spray    FLONASE    1 Package    2 sprays by Both Nostrils route daily.    Nasal congestion       gabapentin 800 MG tablet    NEURONTIN    90 tablet    Take 1 tablet (800 mg) by mouth 3 times daily    Chronic pain syndrome       HYDROcodone-acetaminophen  MG per tablet    NORCO    15 tablet    Take 1 tablet by mouth every 6 hours as needed for pain.        hydrOXYzine 25 MG tablet    ATARAX    120 tablet    Take 1 tablet by mouth 4 times daily as needed for itching.    Moderate major depression (H)       ketoprofen 10% in PLO 10% topical gel      Apply  topically 4 times daily as needed. To back and knees        OMEPRAZOLE PO      Take  by mouth.        oxyCODONE-acetaminophen 5-325 MG per tablet    PERCOCET    180 tablet    1-2 tabs every 6 hours as needed for moderate to severe pain    Chronic pain syndrome       rOPINIRole 0.25 MG tablet    REQUIP    42 tablet    Take 3 tablets (0.75 mg) by mouth At Bedtime for 14 days        SUMAtriptan 50 MG tablet    IMITREX    30 tablet    Take 1 tablet (50 mg) by mouth at onset of headache May repeat in 2 hours if needed: max 2/day; average number of headaches monthly 4; Per [facility] [place], this is a current medication    Chronic pain syndrome       traZODone 50 MG tablet    DESYREL    30 tablet    Take 1 tablet (50 mg) by mouth nightly as needed for sleep    Moderate major depression (H)       * Notice:  This list has 2 medication(s) that are the same as other medications prescribed for you. Read the directions carefully, and ask your doctor or other care provider to review them with you.

## 2018-10-17 NOTE — PROGRESS NOTES
SUBJECTIVE:   Lilliana Cardenas is a 63 year old female who presents to clinic today for the following health issues:      Abnormal Mood Symptoms      Duration:  chronic    Description:  Depression: YES  Anxiety: YES  Panic attacks: no     Accompanying signs and symptoms: see PHQ-9 and SCOTT scores    History (similar episodes/previous evaluation): new to [facility]    Precipitating or alleviating factors: None    Therapies tried and outcome: Celexa (Citalopram), Desyrel (Tazadone), Prozac (Fluoxetine) and seroquil    Diarrhea      Duration: 1 month    Description:       Consistency of stool: watery       Blood in stool: no        Number of loose stools past 24 hours: unsure    Intensity:  mild    Accompanying signs and symptoms:       Fever: no        Nausea/vomitting: no        Abdominal pain: no        Weight loss: YES- pt reports 8# loss    History (recent antibiotics or travel/ill contacts/med changes/testing done): pt believes is due to starting seroquil    Precipitating or alleviating factors: None    Therapies tried and outcome: Imodium AD      Has OV next week at Pain clinic    Problem list and histories reviewed & adjusted, as indicated.  Additional history: as documented    Patient Active Problem List   Diagnosis     Chronic pain syndrome     Chronic low back pain     Opiate dependence (H)     Overdose     Moderate major depression (H)     Past Surgical History:   Procedure Laterality Date     GYN SURGERY         Social History   Substance Use Topics     Smoking status: Former Smoker     Packs/day: 1.00     Years: 38.00     Smokeless tobacco: Never Used      Comment: pt states she has smoked on & off for since she was 18     Alcohol use No     History reviewed. No pertinent family history.      Current Outpatient Prescriptions   Medication Sig Dispense Refill     Carisoprodol 250 MG TABS Take 250 mg by mouth 4 times daily as needed 120 tablet 0     FLUoxetine (PROZAC) 20 MG capsule Take 1 capsule by mouth  every morning. 30 capsule 0     fluticasone (FLONASE) 50 MCG/ACT nasal spray 2 sprays by Both Nostrils route daily. 1 Package 0     HYDROcodone-acetaminophen  MG per tablet Take 1 tablet by mouth every 6 hours as needed for pain. 15 tablet 0     hydrOXYzine (ATARAX) 25 MG tablet Take 1 tablet by mouth 4 times daily as needed for itching. 120 tablet 0     ketoprofen 10% in PLO 10% Apply  topically 4 times daily as needed. To back and knees       OMEPRAZOLE PO Take  by mouth.       oxyCODONE-acetaminophen (PERCOCET) 5-325 MG per tablet 1-2 tabs every 6 hours as needed for moderate to severe pain 180 tablet 0     rOPINIRole (REQUIP) 0.25 MG tablet Take 3 tablets (0.75 mg) by mouth At Bedtime for 14 days 42 tablet 0     SUMAtriptan (IMITREX) 50 MG tablet Take 1 tablet (50 mg) by mouth at onset of headache May repeat in 2 hours if needed: max 2/day; average number of headaches monthly 4; [facility] [place] this is a current medication 30 tablet 0     citalopram (CELEXA) 20 MG tablet Take 1 tablet (20 mg) by mouth daily 90 tablet 3     CITALOPRAM HYDROBROMIDE PO Take  by mouth.       gabapentin (NEURONTIN) 800 MG tablet Take 1 tablet (800 mg) by mouth 3 times daily 90 tablet 0     traZODone (DESYREL) 50 MG tablet Take 1 tablet (50 mg) by mouth nightly as needed for sleep 30 tablet 0     BP Readings from Last 3 Encounters:   10/17/18 124/82   10/04/18 136/74   10/03/18 117/71    Wt Readings from Last 3 Encounters:   10/17/18 165 lb (74.8 kg)   10/04/18 172 lb 12.8 oz (78.4 kg)   10/03/18 170 lb (77.1 kg)                    Reviewed and updated as needed this visit by clinical staff  Tobacco  Allergies  Meds  Med Hx  Surg Hx  Fam Hx  Soc Hx      Reviewed and updated as needed this visit by Provider         ROS:  Constitutional, HEENT, cardiovascular, pulmonary, gi and gu systems are negative, except as otherwise noted.    OBJECTIVE:     /82 (BP Location: Right arm, Patient Position:  "Sitting, Cuff Size: Adult Regular)  Pulse 120  Temp 99.1  F (37.3  C) (Oral)  Resp 16  Ht 5' 3\" (1.6 m)  Wt 165 lb (74.8 kg)  SpO2 100%  BMI 29.23 kg/m2  Body mass index is 29.23 kg/(m^2).  GENERAL: alert and no distress, in WC  RESP: lungs clear to auscultation - no rales, rhonchi or wheezes  CV: regular rate and rhythm, normal S1 S2, no S3 or S4, no murmur, click or rub, no peripheral edema and peripheral pulses strong  PSYCH: tangential, affect flat and anxious        ASSESSMENT/PLAN:     (F32.1) Moderate major depression (H)  (primary encounter diagnosis)  Comment: OK to stop seroquil  Plan: celexa, trazodone refilled through provider inbasket request.    (G89.4) Chronic pain syndrome  Comment:   Plan: pain clinic OV next week.                There are no Patient Instructions on file for this visit.    Bibi Chau NP  [facility]    "

## 2018-10-18 ASSESSMENT — ANXIETY QUESTIONNAIRES: GAD7 TOTAL SCORE: 14

## 2018-10-18 ASSESSMENT — PATIENT HEALTH QUESTIONNAIRE - PHQ9: SUM OF ALL RESPONSES TO PHQ QUESTIONS 1-9: 8

## 2018-10-19 ENCOUNTER — TELEPHONE (OUTPATIENT)
Dept: INTERNAL MEDICINE | Facility: CLINIC | Age: 63
End: 2018-10-19

## 2018-10-19 DIAGNOSIS — G89.4 CHRONIC PAIN SYNDROME: Primary | ICD-10-CM

## 2018-10-19 DIAGNOSIS — F32.1 MODERATE MAJOR DEPRESSION (H): ICD-10-CM

## 2018-10-19 RX ORDER — ROPINIROLE 0.25 MG/1
0.75 TABLET, FILM COATED ORAL AT BEDTIME
Qty: 270 TABLET | Refills: 0 | Status: SHIPPED | OUTPATIENT
Start: 2018-10-19 | End: 2019-01-14

## 2018-10-19 RX ORDER — HYDROXYZINE HYDROCHLORIDE 25 MG/1
25 TABLET, FILM COATED ORAL 4 TIMES DAILY PRN
Qty: 120 TABLET | Refills: 1 | Status: SHIPPED | OUTPATIENT
Start: 2018-10-19 | End: 2019-02-01

## 2018-10-19 NOTE — TELEPHONE ENCOUNTER
Who was the previous prescriber of Topamax?   This is usually a BID medication.   Would like more information on previous dose/frequency prescriber.   No prior Topamax Rx's on file in our clinic.    Please check with patient.

## 2018-10-19 NOTE — TELEPHONE ENCOUNTER
Pt calls for refills of Topamax, Atarax and Requip.     She states these were missed when she saw Bibi Chau NP on Wednesday.     Confirmed the doses, as have not been prescribed before by Bibi Chau NP.    Last OV 10/17/18.     BP Readings from Last 3 Encounters:   10/17/18 124/82   10/04/18 136/74   10/03/18 117/71     Creatinine   Date Value Ref Range Status   10/03/2018 0.78 0.52 - 1.04 mg/dL Final

## 2018-10-22 RX ORDER — TOPIRAMATE 50 MG/1
75 TABLET, FILM COATED ORAL 3 TIMES DAILY
Qty: 135 TABLET | Refills: 5 | Status: ON HOLD | OUTPATIENT
Start: 2018-10-22 | End: 2018-11-29

## 2018-10-22 NOTE — PROGRESS NOTES
[facility] Consultation    Date of visit: 10/23/2018    Reason for consultation:    Lilliana Cardenas is a 63 year old female who is seen in consultation today at the request of Bibi Chau CNP.     Consultation and Evaluation for: back pain    Review of [place] Prescription Monitoring Program (): Today I have also reviewed the patient's history of controlled substance use, as provided by [place] licensed pharmacies and prescriber dispensers.     Review of Pain Questionnaire: Please see the [facility] health questionnaire, which the patient completed and reviewed with me in detail.    Review of Electronic Chart: Today I have also reviewed available medical information in the patient's medical record at [facility] ([facility]), including relevant provider notes, laboratory work, and imaging.     Lilliana Cardenas has been seen at a pain clinic in the past.  [facility], has had neurostimulators and several epidurals with benefit.    Chief Complaint:    Chief Complaint   Patient presents with     Pain       Pain history:  Lilliana Cardenas is a 63 year old female who presents for initial evaluation of chief pain complaint of chronic low back pain.     She has had chronic low back pain for many years now. She states that she has had 5 spinal surgeries in her low back, first in 1999, last was in 2009. She had a lumbar decompression and fusion at that time and this was done at [facility]. Prior to this surgery she was having back pain and pain radiating down her legs. The pain in her legs did not improve after the surgery. She had spinal cord stimulators placed in 2001 and in 2004 a newer model, these did help with the pain but it was removed because the battery was irritating her. She was offered another stimulator at [facility] but wanted to avoid surgeries at that time so she deferred this.    Currently she has pain across her low back that radiates  across her low back and down the front of her legs to her feet. She also has neuropathic pain in her legs and her lower legs tend to swell and get hot.     She has had epidural injections as recently as 3-4 years ago at [facility] and was having a lot of relief with these procedures. This procedure was helping with the pain shooting into her legs.    She also complains of burning/aching hip pain that radiates down the lateral thighs. She has had benefit with bursa injections in the past that lasted months to a year.    Onset: after first surgery for spinal stenosis in 1999  Severity/Intensity: 10/10 at worst, 7/10 at best, 6.5/10 on average  Aggravating factors include: any activity  Relieving factors include: rest, lying down  Red Flags: The patient denies bowel or bladder incontinence, saddle anesthesia, unintentional weight loss, or fever/chills/sweats.         Pain Treatments:  1. Medications:       Current pain medications:  -Topiramate 75mg TID  -Hydroxyzine 25 mg QID prn  -Gabapentin 800mg TID  -Trazodone 50mg hs prn  -Carisoprodol 250mg QID prn  -Percocet 1-2 5/235 tablets q6h prn  -Imitrex 50mg prn  -Citalopram 20mg qd       Previous pain medications:    2. Physical Therapy: last PT was 5+ years ago     TENS unit: helps a little, doesn't have it with her.   3. Pain psychology: did this at [facility] was helpful  4. Surgery: SCSx2, lumbar surgeries x5 with decompression and fusion in 2009  5. Injections: epidurals were helpful, last was 4 years ago  6. Alternative Therapies:    Chiropractic: didn't like    Acupuncture: helpful      Diagnostic tests:  MRI Lumbar spine 11/9/2016  EXAM: MR LUMBAR SPINE WITHOUT CONTRAST    CLINICAL INFORMATION: Low back pain syndrome with thoracolumbar neuritis. History of falling injuries.    COMPARISON: CT lumbar 3/18/2014.    TECHNICAL INFORMATION: Sagittal and axial T1 and fast spin echo T2, sagittal STIR and coronal T2 images were obtained through the lumbar spine. The patient received  5 mL of oral liquid Valium. The patient's O2 saturation and heart rate were monitored throughout the procedure by pulse oximeter, and values remained within normal ranges.    INTERPRETATION:    Osseous Structures:    Fat suppressed images are negative for acute or subacute fractures. Active endplate discogenic marrow changes at L2-3.    Paraspinous Soft Tissues:    No mass lesions. Left renal cyst.    Conus, Cord and Cauda Equina:    Normal position conus and no evidence of intradural mass. High signal cord alteration is noted at T10-11 and T11-12. Chronic adhesive arachnoiditis changes at L4-5 and L5-S1.    L4-5 and L5-S1: Solid interbody and dorsal fusion without recurrent stenosis. Chronic arachnoidal adhesions.    L3-4: Mild disc desiccation, fused facet joints and no spinal stenosis or neural impingement.    L2-3: Gaseous disc degeneration with extensive endplate discogenic marrow changes. No fluid in disc space to suggest infection although chronic infection should be excluded on clinical grounds. Hypertrophic facet degeneration with mild active left facet joint arthropathy. Mild to moderate central stenosis. Mild right and moderate left foraminal stenosis.    L1-2: Severe disc space narrowing with moderate fluid in the disc space likely degenerative in nature with some underlying marrow edema especially into L2 vertebral body. Infection is felt to be less likely but should be excluded on clinical grounds. Mild chronic superior compression deformity of T12.    Moderate to severe central stenosis is present with ventral cord/conus contact by osteophyte and bulging disc. Foraminal stenosis is severe on the right and mild to moderate on the left.    T12-L1: Moderate disc degeneration, bulge with hypertrophic facet arthropathy and moderate central spinal stenosis. Foramina appear patent.    T11-12: Hypertrophic facet degeneration with previous decompression, residual mild central stenosis but no cord compression.  High signal cord alteration is consistent with myelomalacia and atrophy.    T10-11: Moderate disc degeneration with mild central stenosis and bulge abuts the cord. Mild high signal cord alteration suspected at extreme superior margin of sagittal views.      CONCLUSION: Multilevel lower thoracic and lumbar disc degeneration with significant findings as follows:    1. Moderate to severe degenerative central stenosis at L1-2 with impingement on distal cord/conus. Severe right and mild to moderate left foraminal stenosis.    2. Moderate T12-L1 central spinal stenosis.    3. High signal cord alteration at T11-12 status post decompression with mild residual central stenosis. Cord compression and central stenosis at T10-11 with faint high signal cord change.    4. Extensive endplate marrow edema and degeneration at L2-3 most likely degenerative in nature, with mild to moderate central stenosis and severe left foraminal stenosis.    5. Endplate discogenic marrow changes at L2-3 and to a lesser degree L1-2 are likely degenerative in nature although chronic infection can sometimes give this appearance and clinical/laboratory correlation is suggested as clinically indicated.    6. Solid AP fusion from L4 to the sacrum with chronic adhesive arachnoiditis.    NOTE: Comparison with CT lumbar dated 3/18/2014 shows no change in L5 and L4 fusion. Significant interval progression of L2-3 disc degeneration and no change in severe gaseous disc degeneration at L1-2. Superior L1 compression fracture has healed.    WJM:tb    IMPRESSION:  1. Study at least moderately degraded by motion artifact. Foramina in particular are not optimally seen.  2. Degenerative changes seen involving the cervical spine most pronounced at C4-C5 through C6-C7 with multilevel mild to moderate central stenosis.  3. Study is incomplete. Postcontrast sagittal images were not obtained.  4. Nonspecific T2 hyperintensity within the central marlena which may be ischemic  in etiology.   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      TECHNIQUE:  MRI of the cervical spine with and without contrast, 9 mL GADOBUTROL 7.5 MMOL/7.5 ML (1 MMOL/ML) INTRAVENOUS SOLUTION.  Postcontrast sagittal images were not obtained.           COMPARISON:  None.                     FINDINGS:     Sagittal: The images are moderately degraded by motion artifact and artifact related to the patient's size and body habitus. This limits the accuracy in the evaluation for intrinsic signal abnormalities within the cord in particular. No gross abnormal   enhancement is seen within the cord within the limitations of the motion artifact on the postcontrast axial images. Advanced degenerative changes are seen involving the vertebral bodies and endplates most pronounced at C4-C5 through C6-C7. There is mild   chronic anterior wedging at C3-C7. Nonspecific moderate degree of T2 hyperintensity seen within the central marlena. Partially empty sella is noted. Mild bulges are seen at T1-T2 and T2-T3.    Axial    C2-C3: Probable mild to moderate neural foramina narrowing is seen on the left with mild to moderate facet arthropathy. No definite neural foramina narrowing is seen on the right. There is no evidence of significant central stenosis.    C3-C4: Probable mild to moderate neural foramina narrowing is seen on the left with mild to moderate facet arthropathy. No definite neural foramina narrowing is seen on the right. There is a mild bulge. There is no evidence of significant central   stenosis.    C4-C5: Disc osteophyte complex is seen. There is mild central stenosis. No definite neural foramina narrowing is seen.    C5-C6: Disc osteophyte complex is seen. There is mild to moderate central stenosis. Possible mild neural foramina narrowing is seen.    C6-C7: Disc  osteophyte complex is seen. There is a mild retrolisthesis and mild to moderate central stenosis. Possible mild neural foramina narrowing is seen.    C7-T1: Minimal bulge is seen. No definite neural foramina narrowing is seen. There is no evidence of significant central stenosis.       Labs:   CBC RESULTS:   Recent Labs   Lab Test  10/03/18   1807   WBC  7.4   RBC  3.50*   HGB  10.7*   HCT  32.7*   MCV  93   MCH  30.6   MCHC  32.7   RDW  13.4   PLT  214     Last Comprehensive Metabolic Panel:  Sodium   Date Value Ref Range Status   10/03/2018 142 133 - 144 mmol/L Final     Potassium   Date Value Ref Range Status   10/03/2018 3.2 (L) 3.4 - 5.3 mmol/L Final     Chloride   Date Value Ref Range Status   10/03/2018 113 (H) 94 - 109 mmol/L Final     Carbon Dioxide   Date Value Ref Range Status   10/03/2018 20 20 - 32 mmol/L Final     Anion Gap   Date Value Ref Range Status   10/03/2018 9 3 - 14 mmol/L Final     Glucose   Date Value Ref Range Status   10/03/2018 91 70 - 99 mg/dL Final     Urea Nitrogen   Date Value Ref Range Status   10/03/2018 7 7 - 30 mg/dL Final     Creatinine   Date Value Ref Range Status   10/03/2018 0.78 0.52 - 1.04 mg/dL Final     GFR Estimate   Date Value Ref Range Status   10/03/2018 75 >60 mL/min/1.7m2 Final     Comment:     Non  GFR Calc     Calcium   Date Value Ref Range Status   10/03/2018 7.3 (L) 8.5 - 10.1 mg/dL Final     Bilirubin Total   Date Value Ref Range Status   10/03/2018 0.6 0.2 - 1.3 mg/dL Final     Alkaline Phosphatase   Date Value Ref Range Status   10/03/2018 99 40 - 150 U/L Final     ALT   Date Value Ref Range Status   10/03/2018 21 0 - 50 U/L Final     AST   Date Value Ref Range Status   10/03/2018 15 0 - 45 U/L Final                 Past Medical History:  Past Medical History:   Diagnosis Date     Anemia      Anxiety      Chronic pain syndrome      Depressive disorder      Diabetes mellitus (H)      Hypertension      Lower extremity neuropathy      Overdose of  opiate or related narcotic (H) 12/2011       Past Surgical History:  Past Surgical History:   Procedure Laterality Date     GYN SURGERY         Medications:  Current Outpatient Prescriptions   Medication Sig Dispense Refill     Carisoprodol 250 MG TABS Take 250 mg by mouth 4 times daily as needed 120 tablet 0     citalopram (CELEXA) 20 MG tablet Take 1 tablet (20 mg) by mouth daily 90 tablet 3     CITALOPRAM HYDROBROMIDE PO Take  by mouth.       fluticasone (FLONASE) 50 MCG/ACT nasal spray 2 sprays by Both Nostrils route daily. 1 Package 0     gabapentin (NEURONTIN) 800 MG tablet Take 1 tablet (800 mg) by mouth 3 times daily 90 tablet 0     hydrOXYzine (ATARAX) 25 MG tablet Take 1 tablet (25 mg) by mouth 4 times daily as needed for itching 120 tablet 1     ketoprofen 10% in PLO 10% topical gel Place 2-4 g onto the skin every 4 hours as needed for moderate pain 200 g 1     ketoprofen 10% in PLO 10% Apply  topically 4 times daily as needed. To back and knees       OMEPRAZOLE PO Take  by mouth.       oxyCODONE-acetaminophen (PERCOCET) 5-325 MG per tablet 1-2 tabs every 6 hours as needed for moderate to severe pain 180 tablet 0     rOPINIRole (REQUIP) 0.25 MG tablet Take 3 tablets (0.75 mg) by mouth At Bedtime 270 tablet 0     SUMAtriptan (IMITREX) 50 MG tablet Take 1 tablet (50 mg) by mouth at onset of headache May repeat in 2 hours if needed: max 2/day; average number of headaches monthly 4; Per [facility] [place], this is a current medication 30 tablet 0     topiramate (TOPAMAX) 50 MG tablet Take 1.5 tablets (75 mg) by mouth 3 times daily 135 tablet 5     traZODone (DESYREL) 50 MG tablet Take 1 tablet (50 mg) by mouth nightly as needed for sleep 30 tablet 0     FLUoxetine (PROZAC) 20 MG capsule Take 1 capsule by mouth every morning. (Patient not taking: Reported on 10/23/2018) 30 capsule 0     HYDROcodone-acetaminophen  MG per tablet Take 1 tablet by mouth every 6 hours as needed for pain.  (Patient not taking: Reported on 10/23/2018) 15 tablet 0       Allergies:   No Known Allergies    Social History:  Home situation: lives with a neighbor currently  Occupation/Schooling: retired with disability  Tobacco use: Quit 2 years ago  Drug use: many years ago, denies current use  Alcohol use: couple drinks/week      Family history:  No family history on file.    Review of Systems:    POSTIVE IN BOLD  GENERAL: fever/chills, fatigue, general unwell feeling, weight loss.  HEAD/EYES:  headache, dizziness, or vision changes.    EARS/NOSE/THROAT:  Nosebleeds, hearing loss, sinus infection, earache, tinnitus.  IMMUNE:  Allergies, cancer, immune deficiency, or infections.  SKIN:  Urticaria, rash, hives  HEME/Lymphatic:   anemia, easy bruising, easy bleeding.  RESPIRATORY:  cough, wheezing, or shortness of breath  CARDIOVASCULAR/Circulation:  Extremity edema, syncope, hypertension, tachycardia, or angina.  GASTROINTESTINAL:  abdominal pain, nausea/emesis, diarrhea, constipation,  hematochezia, or melena.  ENDOCRINE:  Diabetes, steroid use,  thyroid disease or osteoporosis.  MUSCULOSKELETAL: neck pain, back pain, arthralgia, arthritis, or gout.  GENITOURINARY:  frequency, urgency, dysuria, difficulty voiding, hematuria or incontinence.  NEUROLOGIC:  weakness, numbness, paresthesias, seizure, tremor, stroke or memory loss.  PSYCHIATRIC:  depression, anxiety, stress, suicidal thoughts or mood swings.     Physical Exam:  Vitals:    10/23/18 1500   BP: 160/87   BP Location: Right arm   Patient Position: Sitting   Cuff Size: Adult Regular   Pulse: 79   SpO2: 99%     Exam:  Constitutional: Well developed, well nourished, appears stated age.  HEENT: Head atraumatic, normocephalic. Eyes without conjunctival injection or jaundice. Oropharynx clear. Neck supple. No obvious neck masses.  Cardiovascular: Regular rate/rhythm; no murmurs/rubs/gallops appreciated.  Respiratory: Lungs clear to auscultation bilaterally, no  wheezing/rales/rhonchi.   Gastrointestinal: Normoactive bowel sounds. Abdomen soft, non-tender, without guarding/rebound.  Skin: No rash, lesions, or petechiae of exposed skin.   Extremities: Peripheral pulses intact. No clubbing, cyanosis, or edema.  Psychiatric/mental status: Alert, without lethargy or stupor. Speech fluent. Appropriate affect. Mood normal. Able to follow commands without difficulty.     Musculoskeletal exam:  Gait/Station/Posture: Antalgic, decreased stride and arm swing. Uses scooter for long distance mobility.      Cervical spine:  Range of motion within normal limits moderately decreased in all planes  Myofascial tenderness: bilateral cervical paraspinals C6/7 region  No focal spinous process tenderness.   Spurling's negative bilaterally.      Lumbar spine:  Range of motion moderately decreased in all planes   Rotation/ext to right: painful    Rotation/ext to left: painful   Myofascial tenderness:  Lower lumbar paraspinals  Focal tenderness: bilateral SIJ, greater trochanters were tender to palpation. No gluteal, piriformis,or IT tenderness  SLR: negative bilaterally  Alexys's maneuver: negative    Hip exam:   normal internal and external range of motion bilaterally. DENEEN negative. Scour negative.       Neurologic exam:  CN:  Cranial nerves 2-12 are grossly intact  Motor Strength:  5/5 symmetric UE and LE strength    Reflexes:     Biceps C5:     R:  1/4 L: 1/4   Brachioradialis  C6: R:  1/4 L: 1/4   Triceps C7:  R:  1/4 L: 1/4   Patella L4:  R:  1/4 L: 1/4   Achilles S1:  R:  1/4 L: 1/4    Other reflexes:  Toes downgoing   No ankle clonus    Sensory:  (upper and lower extremities):   Light touch and pin prick:decreased at the ring and 5th digits bilaterally. Symmetric and intact in the lower extremities    Fibromyalgia Assessment:      Tender point survey:  12/18     2010 ACR Criteria for fibromyalgia - scoring   Widespread pain index of > or equal to 7 - yes   Symptoms present for at least  3 months - yes   No other disorder to explain their pain - not for all the symptoms   Symptoms Severity scale score > or equal to 5   Fatigue (0-3) - 2   Waking unrefreshed (0-3) - 2   Cognitive symptoms (0-3) - 2   Somatic Symptoms - 1  (None - 0, Few - 1, Moderate - 2, Great deal - 3)    Patient DOES meet the criteria for a diagnosis of Fibromyalgia.       DIRE Score for ongoing opioid management is calculated as follows:   Diagnosis = 2 pts (slowly progressive; moderate pain/objective findings)   Intractability = 2 pts (most treatments tried; patient not fully engaged/barriers)   Risk    Psych = 2 pts (personality dysfunction/mental illness that moderately interferes with care)    Chem Hlth = 2 pts (use of medications to cope with stress; chemical dependency in remission)   Reliability = 2 pts (occasional difficulties with compliance; generally reliable)   Social = 1 pt (life in chaos; little family support/close relationships; loss normal life rolls)   (Psych + Chem hlth + Reliability + Social) = 7     Efficacy = 2 pts (moderate benefit/function; low med dose; too early/not tried meds)         DIRE Score = 13        7-13: likely NOT suitable candidate for long-term opioid analgesia       14-21: may be a suitable candidate for long-term opioid analgesia     Opioid Risk Tool (ORT) score is calculated as follows:   Family History of Substance Abuse:    Alcohol = 1 pt (yes, female)   Illegal Drugs = 0 pt (no)   Prescription Drugs = 0 pt (no)     Personal History of Substance Abuse:   Alcohol = 0 pt (no)   Illegal Drugs = 0 pt (no)   Prescription Drugs = 0 pt (no)   Age: 0 pt (age < 16; age > 45)     History of Pre-adolescent Sexual Abuse: 0 pt (no)     Psychological Disease: 1 pt (depression)         ORT Score = 2        0-3: Low risk for opioid abuse       4-7: Moderate risk for opioid abuse       >/= 8: High risk for opioid abuse      Assessment:  Ms. Tijerina is a 63 year old with past medical history including  DM, HTN, anxiety depression and chronic pain who presents for evaluation and treatment of chronic low back pain.    1. Chronic low back and leg pain: History of several lumbar surgeries with ongoing pain. She has had spinal cord stimulators in the past with good relief but they were removed due to discomfort from the battery. Her last lumbar surgery was a decompression and fusion in 2009, no spinal cord stimulator after this. She has had persisting pain since the 2009 surgery which is consistent with lumbar spondylosis and radiculopathy. She had epidurals as recently as 4 years ago with good benefit.     2. Chronic bilateral hip pain: Rates hip pain that radiates down the lateral thighs as her most significant pain issue. She has tenderness over the greater trochanters and a positive FADIR. The pain is likely due to gluteal muscle dysfunction/greater trochanter bursitis and IT band syndrome. Bilateral greater trochanter bursa injections performed today in clinic. She has had significant benefit with this in the past.    3. Fibromyalgia: Meets ACR criteria for fibromyalgia. Has tried many medications in the past and is currently on gabapentin 800mg with benefit.     4. Chronic opiate use: Has been on percocet for many years now. States that she has functional benefit in ADLs and mobility and uses this less than prescribed. Discussed goals of the chronic pain clinic include trying to wean opioids as pain is better managed with other treatments and modalities and she was agreeable to this.    Mental Health - the patient's mental health concerns, specifically stress, anxiety and depression, affect her experience of pain and contribute to her clinically significant distress.        Plan:  The following recommendations were given to the patient. Diagnosis, treatment options, risks, benefits, and alternatives were discussed, and all questions were answered. The patient expressed understanding of the plan for management.      I am recommending a multidisciplinary treatment plan to help this patient better manage her pain.  This includes:     1. Physical Therapy: Will refer to PT to help develop a HEP targetting the back and tight hip girdle muscles. Will consider chronic pain PT in the future once stressors in her life have calmed down.  2. Clinical Health Pain Psychologist: Would certainly benefit from biofeed back/coping strategies, again will refer in the future once current psychosocial stressors have decreased.   3. Self Care Recommendations: Gentle progressive exercise that does not increase pain - gradually increase daily walking program.  Take mini breaks - 5 minutes of mindfullness a couple times a day.   4. Diagnostic Studies: none at this time  5. Medication Management:   1. Prescribed ketoprofen 10% gel at patient's request as this has been helpful for aching joints in the past.  2. Continue other medications are currently prescribed  1. Oxycodone 5-10/325 every 6 hours prn - will discuss decreasing this in the future as we get her pain better controlled. Patient agrees with long term weaning plan, states that she doesn't take this medication as often as it is prescribed at this time.  2. Gabapentin 800mg TID  3. Topamax 75mg TID  4. Sumatriptan 50mg prn  6. Further procedures recommended:   1. Bilateral greater trochanter bursa injections performed in clinic on 10/23/18.  2. Caudal epidural steroid injection ordered, will be scheduled 1 month after today as she just had a steroid injection in clinic today.  7. Referrals: none  8. Follow up: 8 weeks in clinic      I spent 75 minutes of time face to face with the patient.  Greater than 50% of this time was spent in patient counseling and/or coordination of care regarding principles of multidisciplinary care, medication management, and treatment options as discussed above.         Maximiliano Sanchez,   [facility]             [facility]  - Procedure Note    Date of Service: 10/23/2018  Procedure performed: bilateral Greater Trochanteric Bursa Injection  Diagnosis: bilateral Trochanteric Bursitis  : Maximiliano Sanchez DO  Anesthesia: none      Indications:   See history and plan above.    Vitals were reviewed: Yes  Allergies were reviewed:  Yes   Medications were reviewed:  Yes   Pre-procedure pain score: 8/10    Procedure:  After getting informed consent, patient was brought into the procedure suite and was placed in a prone position on the procedure table.   A Pause for the Cause was performed.  Patient was prepped and draped in sterile fashion.     The area over the right trochanter was palpated, and the tender area was identified, corresponding to the area of the trochanteric bursa.  The area was cleaned with Chloroprep.  A 22-gauge, 1.5-inch needle was inserted, aiming towards the trochanter.  When bone was encountered, the needle was slightly drawn.  A solution containing local anesthesic and steroid was injected.  The needle was removed.  Hemostasis was achieved. Bandaids were placed as appropriate.    The procedure was repeated on the opposite side.    In total, 4 ml of 0.5% bupivacaine and 1 ml (40 mg) of kenalog was injected.    Hemostasis was achieved, the area was cleaned, and bandaids were placed when appropriate.  The patient tolerated the procedure well, and was taken to the recovery room.    Images were saved to PACS.      Pre-procedure pain score: 8/10  Post-procedure pain score: 4/10     Assessment/Plan: Lilliana Cardenas is a 63 year old female s/p bilateral greater trochanteric bursa steroid injection today for hip pan.     1. Following today's procedure, the patient was advised to contact the [facility] for any of the following:   Fever, chills, or night sweats   New onset of pain, numbness, or weakness   Any questions/concerns regarding the procedure  If unable to contact the Pain Center, the  patient was instructed to go to a local Emergency Room for any complications.   2. The patient will receive a follow-up call in 1 week.  3. The patient should follow-up with the referring provider in 2 weeks for post-procedure evaluation.        Maximiliano Sanchez DO  [facility]

## 2018-10-22 NOTE — TELEPHONE ENCOUNTER
Per Psychiatric, she was in [facility] living and seeing the Geriatric NP there. Getting refills from them.     She was supposed to see Pain clinic through [facility], and they were to prescribe for Chronic Pain. Not sure if ever did.     Bibi Chau NP placed new Pain clinic referral on 10/4/18.   Has appt tomorrow with Pain clinic. 10/23.

## 2018-10-23 ENCOUNTER — OFFICE VISIT (OUTPATIENT)
Dept: PODIATRY | Facility: CLINIC | Age: 63
End: 2018-10-23
Attending: NURSE PRACTITIONER
Payer: MEDICARE

## 2018-10-23 VITALS — SYSTOLIC BLOOD PRESSURE: 160 MMHG | HEART RATE: 79 BPM | DIASTOLIC BLOOD PRESSURE: 87 MMHG | OXYGEN SATURATION: 99 %

## 2018-10-23 DIAGNOSIS — M79.7 FIBROMYALGIA: ICD-10-CM

## 2018-10-23 DIAGNOSIS — M54.16 LUMBAR RADICULAR PAIN: Primary | ICD-10-CM

## 2018-10-23 DIAGNOSIS — M19.90 ARTHRITIS: ICD-10-CM

## 2018-10-23 PROCEDURE — 99205 OFFICE O/P NEW HI 60 MIN: CPT | Performed by: PHYSICAL MEDICINE & REHABILITATION

## 2018-10-23 ASSESSMENT — PAIN SCALES - GENERAL: PAINLEVEL: EXTREME PAIN (8)

## 2018-10-23 NOTE — LETTER
10/23/2018         RE: Lilliana Cardenas  [address]  [facility] [id number]        Dear Colleague,    Thank you for referring your patient, Lilliana Cardenas, to the [facility]. Please see a copy of my visit note below.                          [facility] Consultation    Date of visit: 10/23/2018    Reason for consultation:    Lilliana Cardenas is a 63 year old female who is seen in consultation today at the request of Bibi Chau CNP.     Consultation and Evaluation for: back pain    Review of [place] Prescription Monitoring Program (): Today I have also reviewed the patient's history of controlled substance use, as provided by [place] licensed pharmacies and prescriber dispensers.     Review of Pain Questionnaire: Please see the [facility] health questionnaire, which the patient completed and reviewed with me in detail.    Review of Electronic Chart: Today I have also reviewed available medical information in the patient's medical record at [facility] ([facility]), including relevant provider notes, laboratory work, and imaging.     Lilliana Cardenas has been seen at a pain clinic in the past.  [facility], has had neurostimulators and several epidurals with benefit.    Chief Complaint:    Chief Complaint   Patient presents with     Pain       Pain history:  Lilliana Cardenas is a 63 year old female who presents for initial evaluation of chief pain complaint of chronic low back pain.     She has had chronic low back pain for many years now. She states that she has had 5 spinal surgeries in her low back, first in 1999, last was in 2009. She had a lumbar decompression and fusion at that time and this was done at [facility]. Prior to this surgery she was having back pain and pain radiating down her legs. The pain in her legs did not improve after the surgery. She had spinal cord stimulators placed in 2001 and in 2004 a  newer model, these did help with the pain but it was removed because the battery was irritating her. She was offered another stimulator at [facility] but wanted to avoid surgeries at that time so she deferred this.    Currently she has pain across her low back that radiates across her low back and down the front of her legs to her feet. She also has neuropathic pain in her legs and her lower legs tend to swell and get hot.     She has had epidural injections as recently as 3-4 years ago at [facility] and was having a lot of relief with these procedures. This procedure was helping with the pain shooting into her legs.    She also complains of burning/aching hip pain that radiates down the lateral thighs. She has had benefit with bursa injections in the past that lasted months to a year.    Onset: after first surgery for spinal stenosis in 1999  Severity/Intensity: 10/10 at worst, 7/10 at best, 6.5/10 on average  Aggravating factors include: any activity  Relieving factors include: rest, lying down  Red Flags: The patient denies bowel or bladder incontinence, saddle anesthesia, unintentional weight loss, or fever/chills/sweats.         Pain Treatments:  1. Medications:       Current pain medications:  -Topiramate 75mg TID  -Hydroxyzine 25 mg QID prn  -Gabapentin 800mg TID  -Trazodone 50mg hs prn  -Carisoprodol 250mg QID prn  -Percocet 1-2 5/235 tablets q6h prn  -Imitrex 50mg prn  -Citalopram 20mg qd       Previous pain medications:    2. Physical Therapy: last PT was 5+ years ago     TENS unit: helps a little, doesn't have it with her.   3. Pain psychology: did this at [facility] was helpful  4. Surgery: SCSx2, lumbar surgeries x5 with decompression and fusion in 2009  5. Injections: epidurals were helpful, last was 4 years ago  6. Alternative Therapies:    Chiropractic: didn't like    Acupuncture: helpful      Diagnostic tests:  MRI Lumbar spine 11/9/2016  EXAM: MR LUMBAR SPINE WITHOUT CONTRAST    CLINICAL INFORMATION: Low back pain  syndrome with thoracolumbar neuritis. History of falling injuries.    COMPARISON: CT lumbar 3/18/2014.    TECHNICAL INFORMATION: Sagittal and axial T1 and fast spin echo T2, sagittal STIR and coronal T2 images were obtained through the lumbar spine. The patient received 5 mL of oral liquid Valium. The patient's O2 saturation and heart rate were monitored throughout the procedure by pulse oximeter, and values remained within normal ranges.    INTERPRETATION:    Osseous Structures:    Fat suppressed images are negative for acute or subacute fractures. Active endplate discogenic marrow changes at L2-3.    Paraspinous Soft Tissues:    No mass lesions. Left renal cyst.    Conus, Cord and Cauda Equina:    Normal position conus and no evidence of intradural mass. High signal cord alteration is noted at T10-11 and T11-12. Chronic adhesive arachnoiditis changes at L4-5 and L5-S1.    L4-5 and L5-S1: Solid interbody and dorsal fusion without recurrent stenosis. Chronic arachnoidal adhesions.    L3-4: Mild disc desiccation, fused facet joints and no spinal stenosis or neural impingement.    L2-3: Gaseous disc degeneration with extensive endplate discogenic marrow changes. No fluid in disc space to suggest infection although chronic infection should be excluded on clinical grounds. Hypertrophic facet degeneration with mild active left facet joint arthropathy. Mild to moderate central stenosis. Mild right and moderate left foraminal stenosis.    L1-2: Severe disc space narrowing with moderate fluid in the disc space likely degenerative in nature with some underlying marrow edema especially into L2 vertebral body. Infection is felt to be less likely but should be excluded on clinical grounds. Mild chronic superior compression deformity of T12.    Moderate to severe central stenosis is present with ventral cord/conus contact by osteophyte and bulging disc. Foraminal stenosis is severe on the right and mild to moderate on the  left.    T12-L1: Moderate disc degeneration, bulge with hypertrophic facet arthropathy and moderate central spinal stenosis. Foramina appear patent.    T11-12: Hypertrophic facet degeneration with previous decompression, residual mild central stenosis but no cord compression. High signal cord alteration is consistent with myelomalacia and atrophy.    T10-11: Moderate disc degeneration with mild central stenosis and bulge abuts the cord. Mild high signal cord alteration suspected at extreme superior margin of sagittal views.      CONCLUSION: Multilevel lower thoracic and lumbar disc degeneration with significant findings as follows:    1. Moderate to severe degenerative central stenosis at L1-2 with impingement on distal cord/conus. Severe right and mild to moderate left foraminal stenosis.    2. Moderate T12-L1 central spinal stenosis.    3. High signal cord alteration at T11-12 status post decompression with mild residual central stenosis. Cord compression and central stenosis at T10-11 with faint high signal cord change.    4. Extensive endplate marrow edema and degeneration at L2-3 most likely degenerative in nature, with mild to moderate central stenosis and severe left foraminal stenosis.    5. Endplate discogenic marrow changes at L2-3 and to a lesser degree L1-2 are likely degenerative in nature although chronic infection can sometimes give this appearance and clinical/laboratory correlation is suggested as clinically indicated.    6. Solid AP fusion from L4 to the sacrum with chronic adhesive arachnoiditis.    NOTE: Comparison with CT lumbar dated 3/18/2014 shows no change in L5 and L4 fusion. Significant interval progression of L2-3 disc degeneration and no change in severe gaseous disc degeneration at L1-2. Superior L1 compression fracture has healed.    WJM:tb    IMPRESSION:  1. Study at least moderately degraded by motion artifact. Foramina in particular are not optimally seen.  2. Degenerative changes  seen involving the cervical spine most pronounced at C4-C5 through C6-C7 with multilevel mild to moderate central stenosis.  3. Study is incomplete. Postcontrast sagittal images were not obtained.  4. Nonspecific T2 hyperintensity within the central marlena which may be ischemic in etiology.   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      TECHNIQUE:  MRI of the cervical spine with and without contrast, 9 mL GADOBUTROL 7.5 MMOL/7.5 ML (1 MMOL/ML) INTRAVENOUS SOLUTION.  Postcontrast sagittal images were not obtained.           COMPARISON:  None.                     FINDINGS:     Sagittal: The images are moderately degraded by motion artifact and artifact related to the patient's size and body habitus. This limits the accuracy in the evaluation for intrinsic signal abnormalities within the cord in particular. No gross abnormal   enhancement is seen within the cord within the limitations of the motion artifact on the postcontrast axial images. Advanced degenerative changes are seen involving the vertebral bodies and endplates most pronounced at C4-C5 through C6-C7. There is mild   chronic anterior wedging at C3-C7. Nonspecific moderate degree of T2 hyperintensity seen within the central marlena. Partially empty sella is noted. Mild bulges are seen at T1-T2 and T2-T3.    Axial    C2-C3: Probable mild to moderate neural foramina narrowing is seen on the left with mild to moderate facet arthropathy. No definite neural foramina narrowing is seen on the right. There is no evidence of significant central stenosis.    C3-C4: Probable mild to moderate neural foramina narrowing is seen on the left with mild to moderate facet arthropathy. No definite neural foramina narrowing is seen on the right. There is a mild bulge. There is no evidence of significant central    stenosis.    C4-C5: Disc osteophyte complex is seen. There is mild central stenosis. No definite neural foramina narrowing is seen.    C5-C6: Disc osteophyte complex is seen. There is mild to moderate central stenosis. Possible mild neural foramina narrowing is seen.    C6-C7: Disc osteophyte complex is seen. There is a mild retrolisthesis and mild to moderate central stenosis. Possible mild neural foramina narrowing is seen.    C7-T1: Minimal bulge is seen. No definite neural foramina narrowing is seen. There is no evidence of significant central stenosis.       Labs:   CBC RESULTS:   Recent Labs   Lab Test  10/03/18   1807   WBC  7.4   RBC  3.50*   HGB  10.7*   HCT  32.7*   MCV  93   MCH  30.6   MCHC  32.7   RDW  13.4   PLT  214     Last Comprehensive Metabolic Panel:  Sodium   Date Value Ref Range Status   10/03/2018 142 133 - 144 mmol/L Final     Potassium   Date Value Ref Range Status   10/03/2018 3.2 (L) 3.4 - 5.3 mmol/L Final     Chloride   Date Value Ref Range Status   10/03/2018 113 (H) 94 - 109 mmol/L Final     Carbon Dioxide   Date Value Ref Range Status   10/03/2018 20 20 - 32 mmol/L Final     Anion Gap   Date Value Ref Range Status   10/03/2018 9 3 - 14 mmol/L Final     Glucose   Date Value Ref Range Status   10/03/2018 91 70 - 99 mg/dL Final     Urea Nitrogen   Date Value Ref Range Status   10/03/2018 7 7 - 30 mg/dL Final     Creatinine   Date Value Ref Range Status   10/03/2018 0.78 0.52 - 1.04 mg/dL Final     GFR Estimate   Date Value Ref Range Status   10/03/2018 75 >60 mL/min/1.7m2 Final     Comment:     Non  GFR Calc     Calcium   Date Value Ref Range Status   10/03/2018 7.3 (L) 8.5 - 10.1 mg/dL Final     Bilirubin Total   Date Value Ref Range Status   10/03/2018 0.6 0.2 - 1.3 mg/dL Final     Alkaline Phosphatase   Date Value Ref Range Status   10/03/2018 99 40 - 150 U/L Final     ALT   Date Value Ref Range Status   10/03/2018 21 0 - 50 U/L Final     AST   Date Value Ref Range  Status   10/03/2018 15 0 - 45 U/L Final                 Past Medical History:  Past Medical History:   Diagnosis Date     Anemia      Anxiety      Chronic pain syndrome      Depressive disorder      Diabetes mellitus (H)      Hypertension      Lower extremity neuropathy      Overdose of opiate or related narcotic (H) 12/2011       Past Surgical History:  Past Surgical History:   Procedure Laterality Date     GYN SURGERY         Medications:  Current Outpatient Prescriptions   Medication Sig Dispense Refill     Carisoprodol 250 MG TABS Take 250 mg by mouth 4 times daily as needed 120 tablet 0     citalopram (CELEXA) 20 MG tablet Take 1 tablet (20 mg) by mouth daily 90 tablet 3     CITALOPRAM HYDROBROMIDE PO Take  by mouth.       fluticasone (FLONASE) 50 MCG/ACT nasal spray 2 sprays by Both Nostrils route daily. 1 Package 0     gabapentin (NEURONTIN) 800 MG tablet Take 1 tablet (800 mg) by mouth 3 times daily 90 tablet 0     hydrOXYzine (ATARAX) 25 MG tablet Take 1 tablet (25 mg) by mouth 4 times daily as needed for itching 120 tablet 1     ketoprofen 10% in PLO 10% topical gel Place 2-4 g onto the skin every 4 hours as needed for moderate pain 200 g 1     ketoprofen 10% in PLO 10% Apply  topically 4 times daily as needed. To back and knees       OMEPRAZOLE PO Take  by mouth.       oxyCODONE-acetaminophen (PERCOCET) 5-325 MG per tablet 1-2 tabs every 6 hours as needed for moderate to severe pain 180 tablet 0     rOPINIRole (REQUIP) 0.25 MG tablet Take 3 tablets (0.75 mg) by mouth At Bedtime 270 tablet 0     SUMAtriptan (IMITREX) 50 MG tablet Take 1 tablet (50 mg) by mouth at onset of headache May repeat in 2 hours if needed: max 2/day; average number of headaches monthly 4; Per [facility], this is a current medication 30 tablet 0     topiramate (TOPAMAX) 50 MG tablet Take 1.5 tablets (75 mg) by mouth 3 times daily 135 tablet 5     traZODone (DESYREL) 50 MG tablet Take 1 tablet (50 mg) by mouth  nightly as needed for sleep 30 tablet 0     FLUoxetine (PROZAC) 20 MG capsule Take 1 capsule by mouth every morning. (Patient not taking: Reported on 10/23/2018) 30 capsule 0     HYDROcodone-acetaminophen  MG per tablet Take 1 tablet by mouth every 6 hours as needed for pain. (Patient not taking: Reported on 10/23/2018) 15 tablet 0       Allergies:   No Known Allergies    Social History:  Home situation: lives with a neighbor currently  Occupation/Schooling: retired with disability  Tobacco use: Quit 2 years ago  Drug use: many years ago, denies current use  Alcohol use: couple drinks/week      Family history:  No family history on file.    Review of Systems:    POSTIVE IN BOLD  GENERAL: fever/chills, fatigue, general unwell feeling, weight loss.  HEAD/EYES:  headache, dizziness, or vision changes.    EARS/NOSE/THROAT:  Nosebleeds, hearing loss, sinus infection, earache, tinnitus.  IMMUNE:  Allergies, cancer, immune deficiency, or infections.  SKIN:  Urticaria, rash, hives  HEME/Lymphatic:   anemia, easy bruising, easy bleeding.  RESPIRATORY:  cough, wheezing, or shortness of breath  CARDIOVASCULAR/Circulation:  Extremity edema, syncope, hypertension, tachycardia, or angina.  GASTROINTESTINAL:  abdominal pain, nausea/emesis, diarrhea, constipation,  hematochezia, or melena.  ENDOCRINE:  Diabetes, steroid use,  thyroid disease or osteoporosis.  MUSCULOSKELETAL: neck pain, back pain, arthralgia, arthritis, or gout.  GENITOURINARY:  frequency, urgency, dysuria, difficulty voiding, hematuria or incontinence.  NEUROLOGIC:  weakness, numbness, paresthesias, seizure, tremor, stroke or memory loss.  PSYCHIATRIC:  depression, anxiety, stress, suicidal thoughts or mood swings.     Physical Exam:  Vitals:    10/23/18 1500   BP: 160/87   BP Location: Right arm   Patient Position: Sitting   Cuff Size: Adult Regular   Pulse: 79   SpO2: 99%     Exam:  Constitutional: Well developed, well nourished, appears stated  age.  HEENT: Head atraumatic, normocephalic. Eyes without conjunctival injection or jaundice. Oropharynx clear. Neck supple. No obvious neck masses.  Cardiovascular: Regular rate/rhythm; no murmurs/rubs/gallops appreciated.  Respiratory: Lungs clear to auscultation bilaterally, no wheezing/rales/rhonchi.   Gastrointestinal: Normoactive bowel sounds. Abdomen soft, non-tender, without guarding/rebound.  Skin: No rash, lesions, or petechiae of exposed skin.   Extremities: Peripheral pulses intact. No clubbing, cyanosis, or edema.  Psychiatric/mental status: Alert, without lethargy or stupor. Speech fluent. Appropriate affect. Mood normal. Able to follow commands without difficulty.     Musculoskeletal exam:  Gait/Station/Posture: Antalgic, decreased stride and arm swing. Uses scooter for long distance mobility.      Cervical spine:  Range of motion within normal limits moderately decreased in all planes  Myofascial tenderness: bilateral cervical paraspinals C6/7 region  No focal spinous process tenderness.   Spurling's negative bilaterally.      Lumbar spine:  Range of motion moderately decreased in all planes   Rotation/ext to right: painful    Rotation/ext to left: painful   Myofascial tenderness:  Lower lumbar paraspinals  Focal tenderness: bilateral SIJ, greater trochanters were tender to palpation. No gluteal, piriformis,or IT tenderness  SLR: negative bilaterally  Alexys's maneuver: negative    Hip exam:   normal internal and external range of motion bilaterally. DENEEN negative. Scour negative.       Neurologic exam:  CN:  Cranial nerves 2-12 are grossly intact  Motor Strength:  5/5 symmetric UE and LE strength    Reflexes:     Biceps C5:     R:  1/4 L: 1/4   Brachioradialis  C6: R:  1/4 L: 1/4   Triceps C7:  R:  1/4 L: 1/4   Patella L4:  R:  1/4 L: 1/4   Achilles S1:  R:  1/4 L: 1/4    Other reflexes:  Toes downgoing   No ankle clonus    Sensory:  (upper and lower extremities):   Light touch and pin  prick:decreased at the ring and 5th digits bilaterally. Symmetric and intact in the lower extremities    Fibromyalgia Assessment:      Tender point survey:  12/18 2010 ACR Criteria for fibromyalgia - scoring   Widespread pain index of > or equal to 7 - yes   Symptoms present for at least 3 months - yes   No other disorder to explain their pain - not for all the symptoms   Symptoms Severity scale score > or equal to 5   Fatigue (0-3) - 2   Waking unrefreshed (0-3) - 2   Cognitive symptoms (0-3) - 2   Somatic Symptoms - 1  (None - 0, Few - 1, Moderate - 2, Great deal - 3)    Patient DOES meet the criteria for a diagnosis of Fibromyalgia.       DIRE Score for ongoing opioid management is calculated as follows:   Diagnosis = 2 pts (slowly progressive; moderate pain/objective findings)   Intractability = 2 pts (most treatments tried; patient not fully engaged/barriers)   Risk    Psych = 2 pts (personality dysfunction/mental illness that moderately interferes with care)    Chem Hlth = 2 pts (use of medications to cope with stress; chemical dependency in remission)   Reliability = 2 pts (occasional difficulties with compliance; generally reliable)   Social = 1 pt (life in chaos; little family support/close relationships; loss normal life rolls)   (Psych + Chem hlth + Reliability + Social) = 7     Efficacy = 2 pts (moderate benefit/function; low med dose; too early/not tried meds)         DIRE Score = 13        7-13: likely NOT suitable candidate for long-term opioid analgesia       14-21: may be a suitable candidate for long-term opioid analgesia     Opioid Risk Tool (ORT) score is calculated as follows:   Family History of Substance Abuse:    Alcohol = 1 pt (yes, female)   Illegal Drugs = 0 pt (no)   Prescription Drugs = 0 pt (no)     Personal History of Substance Abuse:   Alcohol = 0 pt (no)   Illegal Drugs = 0 pt (no)   Prescription Drugs = 0 pt (no)   Age: 0 pt (age < 16; age > 45)     History of Pre-adolescent  Sexual Abuse: 0 pt (no)     Psychological Disease: 1 pt (depression)         ORT Score = 2        0-3: Low risk for opioid abuse       4-7: Moderate risk for opioid abuse       >/= 8: High risk for opioid abuse      Assessment:  Ms. Tijerina is a 63 year old with past medical history including DM, HTN, anxiety depression and chronic pain who presents for evaluation and treatment of chronic low back pain.    1. Chronic low back and leg pain: History of several lumbar surgeries with ongoing pain. She has had spinal cord stimulators in the past with good relief but they were removed due to discomfort from the battery. Her last lumbar surgery was a decompression and fusion in 2009, no spinal cord stimulator after this. She has had persisting pain since the 2009 surgery which is consistent with lumbar spondylosis and radiculopathy. She had epidurals as recently as 4 years ago with good benefit.     2. Chronic bilateral hip pain: Rates hip pain that radiates down the lateral thighs as her most significant pain issue. She has tenderness over the greater trochanters and a positive FADIR. The pain is likely due to gluteal muscle dysfunction/greater trochanter bursitis and IT band syndrome. Bilateral greater trochanter bursa injections performed today in clinic. She has had significant benefit with this in the past.    3. Fibromyalgia: Meets ACR criteria for fibromyalgia. Has tried many medications in the past and is currently on gabapentin 800mg with benefit.     4. Chronic opiate use: Has been on percocet for many years now. States that she has functional benefit in ADLs and mobility and uses this less than prescribed. Discussed goals of the chronic pain clinic include trying to wean opioids as pain is better managed with other treatments and modalities and she was agreeable to this.    Mental Health - the patient's mental health concerns, specifically stress, anxiety and depression, affect her experience of pain and  contribute to her clinically significant distress.        Plan:  The following recommendations were given to the patient. Diagnosis, treatment options, risks, benefits, and alternatives were discussed, and all questions were answered. The patient expressed understanding of the plan for management.     I am recommending a multidisciplinary treatment plan to help this patient better manage her pain.  This includes:     1. Physical Therapy: Will refer to PT to help develop a HEP targetting the back and tight hip girdle muscles. Will consider chronic pain PT in the future once stressors in her life have calmed down.  2. Clinical Health Pain Psychologist: Would certainly benefit from biofeed back/coping strategies, again will refer in the future once current psychosocial stressors have decreased.   3. Self Care Recommendations: Gentle progressive exercise that does not increase pain - gradually increase daily walking program.  Take mini breaks - 5 minutes of mindfullness a couple times a day.   4. Diagnostic Studies: none at this time  5. Medication Management:   1. Prescribed ketoprofen 10% gel at patient's request as this has been helpful for aching joints in the past.  2. Continue other medications are currently prescribed  1. Oxycodone 5-10/325 every 6 hours prn - will discuss decreasing this in the future as we get her pain better controlled. Patient agrees with long term weaning plan, states that she doesn't take this medication as often as it is prescribed at this time.  2. Gabapentin 800mg TID  3. Topamax 75mg TID  4. Sumatriptan 50mg prn  6. Further procedures recommended:   1. Bilateral greater trochanter bursa injections performed in clinic on 10/23/18.  2. Caudal epidural steroid injection ordered, will be scheduled 1 month after today as she just had a steroid injection in clinic today.  7. Referrals: none  8. Follow up: 8 weeks in clinic      I spent 75 minutes of time face to face with the patient.   Greater than 50% of this time was spent in patient counseling and/or coordination of care regarding principles of multidisciplinary care, medication management, and treatment options as discussed above.         Maximiliano Sanchez DO  [facility]             [facility] - Procedure Note    Date of Service: 10/23/2018  Procedure performed: bilateral Greater Trochanteric Bursa Injection  Diagnosis: bilateral Trochanteric Bursitis  : Maximiliano Sanchez DO  Anesthesia: none      Indications:   See history and plan above.    Vitals were reviewed: Yes  Allergies were reviewed:  Yes   Medications were reviewed:  Yes   Pre-procedure pain score: 8/10    Procedure:  After getting informed consent, patient was brought into the procedure suite and was placed in a prone position on the procedure table.   A Pause for the Cause was performed.  Patient was prepped and draped in sterile fashion.     The area over the right trochanter was palpated, and the tender area was identified, corresponding to the area of the trochanteric bursa.  The area was cleaned with Chloroprep.  A 22-gauge, 1.5-inch needle was inserted, aiming towards the trochanter.  When bone was encountered, the needle was slightly drawn.  A solution containing local anesthesic and steroid was injected.  The needle was removed.  Hemostasis was achieved. Bandaids were placed as appropriate.    The procedure was repeated on the opposite side.    In total, 4 ml of 0.5% bupivacaine and 1 ml (40 mg) of kenalog was injected.    Hemostasis was achieved, the area was cleaned, and bandaids were placed when appropriate.  The patient tolerated the procedure well, and was taken to the recovery room.    Images were saved to PACS.      Pre-procedure pain score: 8/10  Post-procedure pain score: 4/10     Assessment/Plan: Lilliana Cardenas is a 63 year old female s/p bilateral greater trochanteric bursa steroid injection today for hip  pan.     1. Following today's procedure, the patient was advised to contact the [facility] for any of the following:   Fever, chills, or night sweats   New onset of pain, numbness, or weakness   Any questions/concerns regarding the procedure  If unable to contact the Pain Center, the patient was instructed to go to a local Emergency Room for any complications.   2. The patient will receive a follow-up call in 1 week.  3. The patient should follow-up with the referring provider in 2 weeks for post-procedure evaluation.        Maximiliano Sanchez DO  [facility]          Again, thank you for allowing me to participate in the care of your patient.        Sincerely,        Maximiliano Sanchez MD

## 2018-10-23 NOTE — PATIENT INSTRUCTIONS
1. I ordered the ketoprofen gel, you can apply this to the painful joints as needed    2. We did bursa injections to your hips today, we can repeat this every 4 months or so if its helpful.    3. I ordered an epidural injection, this will be scheduled for at least a month from now.    4. I ordered physical therapy, schedule this at your convenience    5. Follow up in clinic in 8 weeks.    ----------------------------------------------------------------  Clinic Number:  [phone]   Call this number with any questions about your care and for scheduling assistance. Calls are returned Monday through Friday between 8 AM and 4:30 PM. We usually get back to you within 2 business days depending on the issue/request.       Medication refills:    For non-narcotic medications, call your pharmacy directly to request a refill. The pharmacy will contact the [facility] Management Center for authorization. Please allow 3-4 days for these refills to be processed.     For narcotic refills, call the clinic number or send a Kitani message. Please contact us 7-10 days before your refill is due. The message MUST include the name of the specific medication(s) requested and how you would like to receive the prescription(s). The options are as follows:    Pain Clinic staff can mail the prescription to your pharmacy. Please tell us the name of the pharmacy.    You may pick the prescription up at the Pain Clinic (tell us the location) or during a clinic visit with your pain provider    Pain Clinic staff can deliver the prescription to the [place] pharmacy in the clinic building. Please tell us the location.      We believe regular attendance is key to your success in our program.    Any time you are unable to keep your appointment we ask that you call us at least 24 hours in advance to let us know. This will allow us to offer the appointment time to another patient.

## 2018-10-23 NOTE — MR AVS SNAPSHOT
After Visit Summary   10/23/2018    Lilliana Cardenas    MRN: [id number]           Patient Information     Date Of Birth          1955        Visit Information        Provider Department      10/23/2018 3:00 PM Maximiliano Sanchez MD [facility]        Today's Diagnoses     Lumbar radicular pain    -  1    Arthritis          Care Instructions    1. I ordered the ketoprofen gel, you can apply this to the painful joints as needed    2. We did bursa injections to your hips today, we can repeat this every 4 months or so if its helpful.    3. I ordered an epidural injection, this will be scheduled for at least a month from now.    4. I ordered physical therapy, schedule this at your convenience    5. Follow up in clinic in 8 weeks.    ----------------------------------------------------------------  Clinic Number:  [phone]   Call this number with any questions about your care and for scheduling assistance. Calls are returned Monday through Friday between 8 AM and 4:30 PM. We usually get back to you within 2 business days depending on the issue/request.       Medication refills:    For non-narcotic medications, call your pharmacy directly to request a refill. The pharmacy will contact the [facility] Center for authorization. Please allow 3-4 days for these refills to be processed.     For narcotic refills, call the clinic number or send a [employer] message. Please contact us 7-10 days before your refill is due. The message MUST include the name of the specific medication(s) requested and how you would like to receive the prescription(s). The options are as follows:    Pain Clinic staff can mail the prescription to your pharmacy. Please tell us the name of the pharmacy.    You may pick the prescription up at the Pain Clinic (tell us the location) or during a clinic visit with your pain provider    Pain Clinic staff can deliver the prescription to the [place] pharmacy in  the clinic building. Please tell us the location.      We believe regular attendance is key to your success in our program.    Any time you are unable to keep your appointment we ask that you call us at least 24 hours in advance to let us know. This will allow us to offer the appointment time to another patient.               Follow-ups after your visit        Additional Services     [facility] PT, HAND, AND CHIROPRACTIC REFERRAL       Physical Therapy, Hand Therapy and Chiropractic Care are available through:  *[facility]  *Hand Therapy (Occupational Therapy or Physical Therapy)  *[facility]    Call one number to schedule at any of the above locations: [phone].    Physical therapy, Hand therapy and/or Chiropractic care has been recommended by your physician as an excellent treatment option to reduce pain and help people return to normal activities, including sports.  Therapy and/or chiropractic care services are a great complement or alternative to expensive and invasive surgery, injections, or long-term use of prescription medications. The primary goal is to identify the underlying problem and provide you the tools to manage your condition on your own.     Please be aware that coverage of these services is subject to the terms and limitations of your health insurance plan.  Call member services at your health plan with any benefit or coverage questions.      Please bring the following to your appointment:  *Your personal calendar for scheduling future appointments  *Comfortable clothing            PAIN INJECTION EVAL/TREAT/FOLLOW UP                 Future tests that were ordered for you today     Open Future Orders        Priority Expected Expires Ordered    [facility] PT[facility] HAND, AND CHIR[facility]RACTIC REFERRAL Routine  10/23/2019 10/23/2018            Who to contact     If you have questions or need follow up information about today's clinic visit or your schedule please contact  "[facility] directly at [phone].  Normal or non-critical lab and imaging results will be communicated to you by MyChart, letter or phone within 4 business days after the clinic has received the results. If you do not hear from us within 7 days, please contact the clinic through MyChart or phone. If you have a critical or abnormal lab result, we will notify you by phone as soon as possible.  Submit refill requests through [employer] or call your pharmacy and they will forward the refill request to us. Please allow 3 business days for your refill to be completed.          Additional Information About Your Visit        [employer] Information     [employer] lets you send messages to your doctor, view your test results, renew your prescriptions, schedule appointments and more. To sign up, go to [email or web address] . Click on \"Log in\" on the left side of the screen, which will take you to the Welcome page. Then click on \"Sign up Now\" on the right side of the page.     You will be asked to enter the access code listed below, as well as some personal information. Please follow the directions to create your username and password.     Your access code is: [email or web address]  Expires: 2019  3:02 AM     Your access code will  in 90 days. If you need help or a new code, please call your [facility] clinic or [phone].        Care EveryWhere ID     This is your Care EveryWhere ID. This could be used by other organizations to access your [facility] medical records  [id number]        Your Vitals Were     Pulse Pulse Oximetry                79 99%           Blood Pressure from Last 3 Encounters:   10/23/18 160/87   10/17/18 124/82   10/04/18 136/74    Weight from Last 3 Encounters:   10/17/18 165 lb (74.8 kg)   10/04/18 172 lb 12.8 oz (78.4 kg)   10/03/18 170 lb (77.1 kg)              We Performed the Following     PAIN INJECTION EVAL/TREAT/FOLLOW UP          Today's Medication Changes          These changes are " accurate as of 10/23/18  3:49 PM.  If you have any questions, ask your nurse or doctor.               Start taking these medicines.        Dose/Directions    ketoprofen 10% in PLO 10% topical gel   Used for:  Arthritis   Started by:  Maximiliano Sanchez MD        Dose:  2-4 g   Place 2-4 g onto the skin every 4 hours as needed for moderate pain   Quantity:  200 g   Refills:  1            Where to get your medicines      These medications were sent to [facility]/pharmacy #[id number] - [address] AT [address] [address]     Phone:  [phone]     ketoprofen 10% in PLO 10% topical gel                Primary Care Provider Office Phone # Fax #    Uzair Poe [phone]       [facility] [address] [address]  [place] [address]        Equal Access to Services     [facility]: Hadii aad ku hadasho Soomaali, waaxda luqadaha, qaybta kaalmada adeegyada, waxay idiin haygenaron tiff harrington. So [facility] [phone].    ATENCIÓN: Si habla español, tiene a lai disposición servicios gratuitos de asistencia lingüística. Sridevi al [phone].    We comply with applicable federal civil rights laws and [place] laws. We do not discriminate on the basis of race, color, national origin, age, disability, sex, sexual orientation, or gender identity.            Thank you!     Thank you for choosing [facility]  for your care. Our goal is always to provide you with excellent care. Hearing back from our patients is one way we can continue to improve our services. Please take a few minutes to complete the written survey that you may receive in the mail after your visit with us. Thank you!             Your Updated Medication List - Protect others around you: Learn how to safely use, store and throw away your medicines at www.disposemymeds.org.          This list is accurate as of 10/23/18  3:49 PM.  Always use your most recent med  list.                   Brand Name Dispense Instructions for use Diagnosis    Carisoprodol 250 MG Tabs     120 tablet    Take 250 mg by mouth 4 times daily as needed    Chronic pain syndrome       * CITALOPRAM HYDROBROMIDE PO      Take  by mouth.        * citalopram 20 MG tablet    celeXA    90 tablet    Take 1 tablet (20 mg) by mouth daily    Moderate major depression (H)       FLUoxetine 20 MG capsule    PROzac    30 capsule    Take 1 capsule by mouth every morning.    Moderate major depression (H)       fluticasone 50 MCG/ACT spray    FLONASE    1 Package    2 sprays by Both Nostrils route daily.    Nasal congestion       gabapentin 800 MG tablet    NEURONTIN    90 tablet    Take 1 tablet (800 mg) by mouth 3 times daily    Chronic pain syndrome       HYDROcodone-acetaminophen  MG per tablet    NORCO    15 tablet    Take 1 tablet by mouth every 6 hours as needed for pain.        hydrOXYzine 25 MG tablet    ATARAX    120 tablet    Take 1 tablet (25 mg) by mouth 4 times daily as needed for itching    Moderate major depression (H)       ketoprofen 10% in PLO 10% topical gel      Apply  topically 4 times daily as needed. To back and knees        ketoprofen 10% in PLO 10% topical gel     200 g    Place 2-4 g onto the skin every 4 hours as needed for moderate pain    Arthritis       OMEPRAZOLE PO      Take  by mouth.        oxyCODONE-acetaminophen 5-325 MG per tablet    PERCOCET    180 tablet    1-2 tabs every 6 hours as needed for moderate to severe pain    Chronic pain syndrome       rOPINIRole 0.25 MG tablet    REQUIP    270 tablet    Take 3 tablets (0.75 mg) by mouth At Bedtime    Chronic pain syndrome       SUMAtriptan 50 MG tablet    IMITREX    30 tablet    Take 1 tablet (50 mg) by mouth at onset of headache May repeat in 2 hours if needed: max 2/day; average number of headaches monthly 4; Per [facility], this is a current medication    Chronic pain syndrome       topiramate 50 MG  tablet    TOPAMAX    135 tablet    Take 1.5 tablets (75 mg) by mouth 3 times daily    Chronic pain syndrome       traZODone 50 MG tablet    DESYREL    30 tablet    Take 1 tablet (50 mg) by mouth nightly as needed for sleep    Moderate major depression (H)       * Notice:  This list has 2 medication(s) that are the same as other medications prescribed for you. Read the directions carefully, and ask your doctor or other care provider to review them with you.

## 2018-10-24 ENCOUNTER — TELEPHONE (OUTPATIENT)
Dept: PALLIATIVE MEDICINE | Facility: CLINIC | Age: 63
End: 2018-10-24

## 2018-10-24 DIAGNOSIS — M19.90 ARTHRITIS: ICD-10-CM

## 2018-10-24 NOTE — TELEPHONE ENCOUNTER
LM for patient to schedule Caudal MARY (schedule for after 11/23/2018 per order)      Aditi Mayen    [facility]

## 2018-10-24 NOTE — TELEPHONE ENCOUNTER
Reason for Call:  Other prescription    Detailed comments: [facility] calling aboutketoprofen 10% in PLO 10% topical gel [   rx: the rx prescribed is a compound, they do not do compounds.  The patient is moving to [place].  [facility] thinks this should be sent to the [place] pharmacy in [place]    Phone Number Patient can be reached at:      [facility]/PHARMACY #[id number] [facility] [address] AT [facility]      Best Time: any    Can we leave a detailed message on this number? YES    Call taken on 10/24/2018 at 10:47 AM by Julianna Vallecillo

## 2018-10-25 NOTE — TELEPHONE ENCOUNTER
Called patient to inform them that their medication will be sent to a compound pharmacy and when it is filled will be sent to her home. LVM reiterating this. Provided number for patient to call with questions.     Routing to provider to [facility].     Marysol LAWS-RN Care Coordinator  [facility]

## 2018-10-29 NOTE — TELEPHONE ENCOUNTER
LM for patient to schedule Caudal MARY (schedule after 11/23 per order)      Aditi Mayen    [facility]

## 2018-11-01 ENCOUNTER — TELEPHONE (OUTPATIENT)
Dept: PALLIATIVE MEDICINE | Facility: CLINIC | Age: 63
End: 2018-11-01

## 2018-11-01 DIAGNOSIS — G89.4 CHRONIC PAIN SYNDROME: ICD-10-CM

## 2018-11-01 NOTE — TELEPHONE ENCOUNTER
Dr. Sanchez,  Are you taking over prescribing this medication for patient? If so, we need a current UDS and opioid agreement before this script can be given to patient. Also, please include a max # of pills a day for patient and update quantity of pills. Thanks.     Script prepped, please advise and make changes as needed.    Medication refill information reviewed.     Due date for Percocet is 11/16/18     Prescriptions prepped for review.     Will route to provider.     Marysol ROTHMANN-RN Care Coordinator  [facility]

## 2018-11-01 NOTE — LETTER
[facility]    11/19/18    Patient: Lilliana Cardenas  YOB: 1955  Medical Record Number: [id number]                                                                  Controlled Substance Agreement  I understand that my care provider has prescribed controlled substances (narcotics, tranquilizers, and/or stimulants) to help manage my condition(s).  I am taking this medicine to help me function or work.  I know that this is strong medicine.  It could have serious side effects and even cause a dependency on the drug.  If I stop these medicines suddenly, I could have severe withdrawal symptoms.    The risks, benefits, and side effects of these medication(s) were explained to me.  I agree that:  1. I will take part in other treatments as advised by my provider.  This may be psychiatry or counseling, physical therapy, behavioral therapy, group treatment, or a referral to a pain clinic.  I will reduce or stop my medicine when my provider tells me to do so.   2. I will take my medicines as prescribed.  I will not change the dose or schedule unless my provider tells me to.  There will be no refills if I  run out early.   I may be contacted at any time without warning and asked to complete a drug test or pill count.   3. I will keep all my appointments at the clinic.  If I miss appointments or fail to follow instructions, my provider may stop my medicine.  4. I will not ask other providers to prescribe controlled substances. And I will not accept controlled substances from other people. If I need another prescribed controlled substance for a new reason, I will notify my provider within one business day.  5. If I enroll in the [place] Medical Marijuana program, I will tell my provider.  I will also sign an agreement to share my medical records with my provider.  6. I will use one pharmacy to fill all of my controlled substance prescriptions.  If my prescription is mailed to my pharmacy, it may take  5 to 7 days for my medicine to be ready.  7. I understand that my provider, clinic care team, and pharmacy can track controlled substance prescriptions from other providers through a central database (prescription monitoring program).  8. I will bring in my list of medications (or my medicine bottles) each time I come to the clinic.  [id number] REV-  07/2018                                                                                                                                   Page 1 of 2      [facility]    11/19/18    Patient: Lilliana Cardenas  YOB: 1955  Medical Record Number: [id number]    9. Refills of controlled substances will be made only during office hours.  It is up to me to make sure that I do not run out of my medicines on weekends or holidays.    10. I am responsible for my prescriptions.  If the medicine/prescription is lost or stolen, it will not be replaced.   I also agree not to share these medicines with anyone.  11. I agree to not use ANY illegal or recreational drugs.  This includes marijuana, cocaine, bath salts or other drugs.  I agree not to use alcohol unless my provider says I may.  I agree to give urine samples whenever asked.  If I fail to give a urine sample, the provider may stop my medicine.     12. I will tell my nurse or provider right away if I become pregnant or have a new medical problem treated outside of [facility].  13. I understand that this medicine can affect my thinking and judgment.  It may be unsafe for me to drive, use machinery and do dangerous tasks.  I will not do any of these things until I know how the medicine affects me.  If my dose changes, I will wait to see how it affects me.  I will contact my provider if I have concerns about medicine side effects.  I understand that if I do not follow any of the conditions above, my prescriptions or treatment may be stopped.    I agree that my provider, clinic care team, and pharmacy  may work with any city, state or federal law enforcement agency that investigates the misuse, sale, or other diversion of my controlled medicine. I will allow my provider to discuss my care with or share a copy of this agreement with any other treating provider, pharmacy or emergency room where I receive care.  I agree to give up (waive) any right of privacy or confidentiality with respect to these authorizations.   I have read this agreement and have asked questions about anything I did not understand.   ___________________________________    ___________________________  Patient Signature                                                           Date and Time  ___________________________________     ____________________________  Witness                                                                            Date and Time  ___________________________________  Maximiliano Sanchez MD  [id number] REV-  07/2018                                                                                                                                                   Page 2 of 2

## 2018-11-01 NOTE — TELEPHONE ENCOUNTER
Pre-screening Questions for Radiology Injections:    Injection to be done at which interventional clinic site? [facility]    Instruct patient to arrive as directed prior to the scheduled appointment time:    [place]: 30 minutes before      Procedure ordered by RN    Procedure ordered? CAUDAL Epidural Steroid Injection    What insurance would patient like us to bill for this procedure? MEDICARE      Worker's comp or MVA (motor vehicle accident) -Any injection DO NOT SCHEDULE and route to Crys Delano.      [employer] - For SI joint injections, DO NOT SCHEDULE and route Crys Delano. [employer] FREEDOM NO PA REQUIRED EFFECTIVE 11/1/2017      [employer]- MBB's must be scheduled at LEAST two weeks apart      [employer] - Any injection besides hip/shoulder/knee joint DO NOT SCHEDULE and route to Crys Wick. She will obtain PA and call pt back to schedule procedure or notify pt of denial.        [employer]-Route to Crys for review    Any chance of pregnancy? NO   If YES, do NOT schedule and route to RN pool    Is an  needed? No     Patient has a drive home? (mandatory) YES:     Is patient taking any blood thinners (plavix, coumadin, jantoven, warfarin, heparin, pradaxa or dabigatran )? No   If hold needed, do NOT schedule, route to RN pool     Is patient taking any aspirin products (includes Excedrin and Fiorinal)? No     If more than 325mg/day do NOT schedule; route to RN pool     For CERVICAL procedures, hold all aspirin products for 6 days.     Tell pt that if aspirin product is not held for 6 days, the procedure WILL BE cancelled.      Does the patient have a bleeding or clotting disorder? No     If YES, okay to schedule AND route to RN nurse pool    For any patients with platelet count <100, must be forwarded to provider    Is patient diabetic?  No  If YES, have them bring their glucometer.    Does patient have an active infection or treated for one within the  past week? No     Is patient currently taking any antibiotics?  No     For patients on chronic, preventative, or prophylactic antibiotics, procedures may be scheduled.     For patients on antibiotics for active or recent infection:    Guillermo Evans Burton, Snitzer-antibiotic course must have been completed for 4 days    Is patient currently taking any steroid medications? (i.e. Prednisone, Medrol)  No     For patients on steroid medications:    Guillermo Evans Burton, Snitzer-steroid course must have been completed for 4 days    Reviewed with patient:  If you are started on any steroids or antibiotics between now and your appointment, you must contact us because the procedure may need to be cancelled.  Yes    Is patient actively being treated for cancer or immunocompromised? No  If YES, do NOT schedule and route to RN pool     Are you able to get on and off an exam table with minimal or no assistance? Yes  If NO, do NOT schedule and route to RN pool    Are you able to roll over and lay on your stomach with minimal or no assistance? Yes  If NO, do NOT schedule and route to RN pool     Any allergies to contrast dye, iodine, shellfish, or numbing and steroid medications? No  If YES, route to RN pool AND add allergy information to appointment notes    Allergies: Review of patient's allergies indicates no known allergies.      Has the patient had a flu shot or any other vaccinations within 7 days before or after the procedure.  No     Does patient have an MRI/CT?  YES:   (SI joint, hip injections, lumbar sympathetic blocks, and stellate ganglion blocks do not require an MRI)    Was the MRI done w/in the last 3 years?  Yes    Was MRI done at [facility]? No      If not, [facility] it done? [facility]       If [facility] was not [facility] at [facility], [facility] do NOT schedule and route to nursing.  If pt has an imaging disc, the injection may be scheduled but pt has to bring disc to appt. If they show up  w/out disc the injection cannot be done    Reminders (please tell patient if applicable):       Instructed pt to arrive 30 minutes early for IV start if this is for a cervical procedure, ALL sympathetic (stellate ganglion, hypogastric, or lumbar sympathetic block) and all sedation procedures (RFA, spinal cord stimulation trials).  Not Applicable   -IVs are not routinely placed for Dr. Gibbs cervical cases   -Dr. Caballero: IVs for cervical ESIs and cervical TBDs (not CMBBs/facet inj)      If NPO for sedation, informed patient that it is okay to take medications with sips of water (except if they are to hold blood thinners).  Not Applicable   *DO take blood pressure medication if it is prescribed*      If this is for a cervical MARY, informed patient that aspirin needs to be held for 6 days.   Not Applicable      For all patients not having spinal cord stimulator (SCS) trials or radiofrequency ablations (RFAs), informed patient:    IV sedation is not provided for this procedure.  If you feel that an oral anti-anxiety medication is needed, you can discuss this further with your referring provider or primary care provider.  The Pain Clinic provider will discuss specifics of what the procedure includes at your appointment.  Most procedures last 10-20 minutes.  We use numbing medications to help with any discomfort during the procedure.  Not Applicable      Do not schedule procedures requiring IV placement in the first appointment of the day or first appointment after lunch. Do NOT schedule at 0745, 0815 or 1245.       For patients 85 or older we recommend having an adult stay w/ them for the remainder of the day.       Does the patient have any questions?    Crys Wick  [facility]

## 2018-11-01 NOTE — TELEPHONE ENCOUNTER
Per last OV note:  1.    Continue other medications are currently prescribed  Oxycodone 5-10/325 every 6 hours prn - will discuss decreasing this in the future as we get her pain better controlled. Patient agrees with long term weaning plan, states that she doesn't take this medication as often as it is prescribed at this time.    Patient requesting refill of oxycodone. Routed to MA pool to gather information.    Marysol LAWS-RN Care Coordinator  [facility]

## 2018-11-01 NOTE — TELEPHONE ENCOUNTER
Received call from patient requesting refill(s) of oxyCODONE-acetaminophen (PERCOCET) 5-325 MG per tablet     Last picked up from pharmacy on 10/17/18    Pt last seen by prescribing provider on 10/23/18  Next appt scheduled for 11/26/18     checked in the past 6 months? Yes If no, print current report and give to RN    Last urine drug screen date 4/9/12  Current opioid agreement on file (completed within the last year) No Date of opioid agreement:     Processing       [facility] [address] [address]  Phone: [phone] Fax: [phone]              Will route to nursing pool for review and preparation of prescription(s).

## 2018-11-01 NOTE — TELEPHONE ENCOUNTER
Patient is calling to request a refill of Oxycodone, she states that Dr. Sanchez was going to take over prescribing. Per patient, she was told by referring provider that we are going to take over all her pain medications.         Please call      Crys MUHAMMAD    [facility]

## 2018-11-07 ENCOUNTER — TELEPHONE (OUTPATIENT)
Dept: INTERNAL MEDICINE | Facility: CLINIC | Age: 63
End: 2018-11-07

## 2018-11-07 DIAGNOSIS — G89.4 CHRONIC PAIN SYNDROME: ICD-10-CM

## 2018-11-07 NOTE — TELEPHONE ENCOUNTER
1.  Patient states her pain specialist Dr. Sanchez is on paternity leave until 11/26/18, pt has an appt with him on that date.  States she needs Bibi to refill her Percocet as she left a message at Dr. Sanchez's office last week requesting a refill and has not heard back.    2.  Asking for refill of Imitrex.  States last rx was 10/4/18 and she could only get 21 tabs rather than the 30 tabs ordered.  States those 21 tabs are gone, has been under a lot of stress and getting more headaches.      check shows previous Percocet rxs from [facility] and 1 from SON[facility]O PORTILLO in Sept. 2018.  Most recent rxs have been from Bibi.  SUKH Schofield R.N.

## 2018-11-08 DIAGNOSIS — G89.4 CHRONIC PAIN SYNDROME: ICD-10-CM

## 2018-11-08 RX ORDER — SUMATRIPTAN 50 MG/1
50 TABLET, FILM COATED ORAL
Qty: 30 TABLET | Refills: 0 | Status: SHIPPED | OUTPATIENT
Start: 2018-11-08 | End: 2018-12-23

## 2018-11-08 NOTE — TELEPHONE ENCOUNTER
Please advise pt needs to get controlled substance refill from pain clinic.  Did refill imitrex  Bibi Chau CNP

## 2018-11-08 NOTE — TELEPHONE ENCOUNTER
"Requested Prescriptions   Pending Prescriptions Disp Refills     SUMAtriptan (IMITREX) 50 MG tablet [Pharmacy Med Name: SUMATRIPTAN SUCC 50 MG TABLET] 21 tablet 0    Last Written Prescription Date:  11/08/2018  Last Fill Quantity: 30,  # refills: 0   Last office visit: 10/17/2018 with prescribing provider:     Future Office Visit:   Next 5 appointments (look out 90 days)     Dec 18, 2018 11:30 AM CST   Return Visit with Maximiliano Sanchez MD   [facility] ([facility])    [address]  [place] [address]   [phone]                Sig: TAKE 1 TAB AT ONSET OF MIGRAINE. MAY REPEAT AFTER 2 HRS. MAX 2 TABS/DAY    Serotonin Agonists Failed    11/8/2018  4:21 PM       Failed - Blood pressure under 140/90 in past 12 months    BP Readings from Last 3 Encounters:   10/23/18 160/87   10/17/18 124/82   10/04/18 136/74                Failed - Serotonin Agonist request needs review.    Please review patient's record. If patient has had 8 or more treatments in the past month, please forward to provider.         Passed - Recent (12 mo) or future (30 days) visit within the authorizing provider's specialty    Patient had office visit in the last 12 months or has a visit in the next 30 days with authorizing provider or within the authorizing provider's specialty.  See \"Patient Info\" tab in inbasket, or \"Choose Columns\" in Meds & Orders section of the refill encounter.             Passed - Patient is age 18 or older       Passed - No active pregnancy on record       Passed - No positive pregnancy test in past 12 months        "

## 2018-11-09 ENCOUNTER — TELEPHONE (OUTPATIENT)
Dept: INTERNAL MEDICINE | Facility: CLINIC | Age: 63
End: 2018-11-09

## 2018-11-09 RX ORDER — SUMATRIPTAN 50 MG/1
TABLET, FILM COATED ORAL
Start: 2018-11-09

## 2018-11-09 NOTE — TELEPHONE ENCOUNTER
[facility] requesting refill  Denied  Rx sent to [facility] ([phone]) yesterday 11/8/2018  Michelle NAYLOR RN

## 2018-11-09 NOTE — TELEPHONE ENCOUNTER
Received fax from pharmacy stating patient is asking for a new prescription for Pramipexole.    Medication not on current medication list.  Listed historical 8-23-11.    Last office visit 10-17-18    Please advise, thanks.

## 2018-11-11 RX ORDER — OXYCODONE AND ACETAMINOPHEN 5; 325 MG/1; MG/1
TABLET ORAL
Qty: 180 TABLET | Refills: 0 | Status: SHIPPED | OUTPATIENT
Start: 2018-11-16 | End: 2018-11-13

## 2018-11-11 NOTE — TELEPHONE ENCOUNTER
I will take over prescribing for the time being. Can we have her come into clinic to do the UDS and opioid agreement?    Maximiliano Sanchez, DO  [facility]

## 2018-11-13 NOTE — TELEPHONE ENCOUNTER
Called patient to see where she would like to  her prescription. LVM for patient to call back    Marysol LAWS-RN Care Coordinator  [facility]

## 2018-11-13 NOTE — TELEPHONE ENCOUNTER
New script prepped. Nursing to call and have patient come in for  UDS and OA.     Marysol Sanchez   BSN-RN Care Coordinator  [facility]

## 2018-11-14 NOTE — TELEPHONE ENCOUNTER
Outreach X2. Left VM requesting call back.    Agustina Moncada, LORNAN, RN  Care Coordinator  [facility]

## 2018-11-15 DIAGNOSIS — F32.1 MODERATE MAJOR DEPRESSION (H): ICD-10-CM

## 2018-11-15 DIAGNOSIS — G89.4 CHRONIC PAIN SYNDROME: ICD-10-CM

## 2018-11-15 NOTE — TELEPHONE ENCOUNTER
Called patient (outreach x3) to clarify where she would like to  her prescription. LVM requesting call back to discuss.     Marysol ROTHMANN-RN Care Coordinator  [facility]

## 2018-11-16 NOTE — TELEPHONE ENCOUNTER
"  traZODone (DESYREL) 50 MG tablet  Last Written Prescription Date:  10/17/18  Last Fill Quantity: 30,   # refills: 0  Last Office Visit: 10/17/18  Future Office visit:    Next 5 appointments (look out 90 days)     Dec 18, 2018 11:30 AM CST   Return Visit with Maximiliano Sanchez MD   [facility] ([facility])    [address]  [place] [id number] [phone]                 AND    gabapentin (NEURONTIN) 800 MG tablet   Last Written Prescription Date:  10/17/18  Last Fill Quantity: 90,   # refills: 0  Last Office Visit: 10/17/18  Future Office visit:            Routing refill request to provider for review/approval because:  Drug not on the [facility], [facility] or [facility] refill protocol or controlled substance      Requested Prescriptions   Pending Prescriptions Disp Refills     traZODone (DESYREL) 50 MG tablet [Pharmacy Med Name: TRAZODONE 50 MG TABLET] 30 tablet 0     Sig: TAKE 1 TABLET BY MOUTH NIGHTLY AS NEEDED FOR SLEEP    Serotonin Modulators Passed    11/15/2018  3:27 AM       Passed - Recent (12 mo) or future (30 days) visit within the authorizing provider's specialty    Patient had office visit in the last 12 months or has a visit in the next 30 days with authorizing provider or within the authorizing provider's specialty.  See \"Patient Info\" tab in inbasket, or \"Choose Columns\" in Meds & Orders section of the refill encounter.             Passed - Patient is age 18 or older       Passed - No active pregnancy on record       Passed - No positive pregnancy test in past 12 months        gabapentin (NEURONTIN) 800 MG tablet [Pharmacy Med Name: GABAPENTIN 800 MG TABLET] 90 tablet 0     Sig: TAKE 1 TABLET BY MOUTH THREE TIMES A DAY    There is no refill protocol information for this order          "

## 2018-11-19 DIAGNOSIS — G89.4 CHRONIC PAIN SYNDROME: ICD-10-CM

## 2018-11-19 PROCEDURE — 80307 DRUG TEST PRSMV CHEM ANLYZR: CPT | Mod: 90 | Performed by: PHYSICAL MEDICINE & REHABILITATION

## 2018-11-19 PROCEDURE — 99000 SPECIMEN HANDLING OFFICE-LAB: CPT | Performed by: PHYSICAL MEDICINE & REHABILITATION

## 2018-11-19 RX ORDER — OXYCODONE AND ACETAMINOPHEN 5; 325 MG/1; MG/1
TABLET ORAL
Qty: 180 TABLET | Refills: 0 | Status: SHIPPED | OUTPATIENT
Start: 2018-11-19 | End: 2019-01-11

## 2018-11-19 RX ORDER — GABAPENTIN 800 MG/1
TABLET ORAL
Qty: 90 TABLET | Refills: 0 | Status: ON HOLD | OUTPATIENT
Start: 2018-11-19 | End: 2018-11-29

## 2018-11-19 RX ORDER — TRAZODONE HYDROCHLORIDE 50 MG/1
TABLET, FILM COATED ORAL
Qty: 30 TABLET | Refills: 0 | Status: ON HOLD | OUTPATIENT
Start: 2018-11-19 | End: 2018-11-29

## 2018-11-19 NOTE — TELEPHONE ENCOUNTER
Patient called back. At [facility] now and will  script here.     Marysol ROTHMANN-RN Care Coordinator  [facility] [place]

## 2018-11-19 NOTE — TELEPHONE ENCOUNTER
Obtained urine specimen.  Tracking number [id number], temp WDL degrees.  Specimen sent to the lab.  Reviewed opioid agreement with patient and the patient stated understanding.  Patient signed agreement and a copy was given to nila LAWS-RN Care Coordinator  [facility]

## 2018-11-19 NOTE — TELEPHONE ENCOUNTER
Unable to reach patient. Has scheduled visit with Dr. Sanchez next week. Will address prescription at that time.     Marysol ROTHMANN-RN Care Coordinator  [facility]

## 2018-11-24 ENCOUNTER — HOSPITAL ENCOUNTER (INPATIENT)
Facility: CLINIC | Age: 63
LOS: 12 days | Discharge: NURSING, REHAB OR LONG-TERM CARE | DRG: 885 | End: 2018-12-06
Attending: EMERGENCY MEDICINE | Admitting: PSYCHIATRY & NEUROLOGY
Payer: COMMERCIAL

## 2018-11-24 DIAGNOSIS — G89.29 CHRONIC LOW BACK PAIN, UNSPECIFIED BACK PAIN LATERALITY, WITH SCIATICA PRESENCE UNSPECIFIED: ICD-10-CM

## 2018-11-24 DIAGNOSIS — J45.20 MILD INTERMITTENT ASTHMA WITHOUT COMPLICATION: ICD-10-CM

## 2018-11-24 DIAGNOSIS — M54.5 CHRONIC LOW BACK PAIN, UNSPECIFIED BACK PAIN LATERALITY, WITH SCIATICA PRESENCE UNSPECIFIED: ICD-10-CM

## 2018-11-24 DIAGNOSIS — E56.9 VITAMIN DEFICIENCY: ICD-10-CM

## 2018-11-24 DIAGNOSIS — G89.4 CHRONIC PAIN SYNDROME: ICD-10-CM

## 2018-11-24 DIAGNOSIS — K21.9 GASTROESOPHAGEAL REFLUX DISEASE WITHOUT ESOPHAGITIS: ICD-10-CM

## 2018-11-24 DIAGNOSIS — I10 BENIGN ESSENTIAL HYPERTENSION: ICD-10-CM

## 2018-11-24 DIAGNOSIS — F32.A DEPRESSION, UNSPECIFIED DEPRESSION TYPE: ICD-10-CM

## 2018-11-24 DIAGNOSIS — F51.04 PSYCHOPHYSIOLOGICAL INSOMNIA: ICD-10-CM

## 2018-11-24 DIAGNOSIS — J34.89 DRY NOSE: ICD-10-CM

## 2018-11-24 DIAGNOSIS — R45.851 SUICIDAL IDEATION: ICD-10-CM

## 2018-11-24 DIAGNOSIS — G43.909 MIGRAINE WITHOUT STATUS MIGRAINOSUS, NOT INTRACTABLE, UNSPECIFIED MIGRAINE TYPE: ICD-10-CM

## 2018-11-24 DIAGNOSIS — F41.8 DEPRESSION WITH ANXIETY: Primary | ICD-10-CM

## 2018-11-24 LAB
AMPHETAMINES UR QL SCN: NEGATIVE
BARBITURATES UR QL: NEGATIVE
BENZODIAZ UR QL: NEGATIVE
CANNABINOIDS UR QL SCN: NEGATIVE
COCAINE UR QL: NEGATIVE
ETHANOL UR QL SCN: NEGATIVE
OPIATES UR QL SCN: NEGATIVE

## 2018-11-24 PROCEDURE — 25000128 H RX IP 250 OP 636: Performed by: EMERGENCY MEDICINE

## 2018-11-24 PROCEDURE — A9270 NON-COVERED ITEM OR SERVICE: HCPCS | Performed by: PSYCHIATRY & NEUROLOGY

## 2018-11-24 PROCEDURE — 25000128 H RX IP 250 OP 636: Performed by: INTERNAL MEDICINE

## 2018-11-24 PROCEDURE — 25000132 ZZH RX MED GY IP 250 OP 250 PS 637: Performed by: INTERNAL MEDICINE

## 2018-11-24 PROCEDURE — 96361 HYDRATE IV INFUSION ADD-ON: CPT | Performed by: EMERGENCY MEDICINE

## 2018-11-24 PROCEDURE — 96374 THER/PROPH/DIAG INJ IV PUSH: CPT | Performed by: EMERGENCY MEDICINE

## 2018-11-24 PROCEDURE — 99285 EMERGENCY DEPT VISIT HI MDM: CPT | Mod: 25 | Performed by: EMERGENCY MEDICINE

## 2018-11-24 PROCEDURE — 80320 DRUG SCREEN QUANTALCOHOLS: CPT | Performed by: FAMILY MEDICINE

## 2018-11-24 PROCEDURE — 25000132 ZZH RX MED GY IP 250 OP 250 PS 637: Performed by: PSYCHIATRY & NEUROLOGY

## 2018-11-24 PROCEDURE — 12400007 ZZH R&B MH INTERMEDIATE UMMC

## 2018-11-24 PROCEDURE — 96375 TX/PRO/DX INJ NEW DRUG ADDON: CPT | Performed by: EMERGENCY MEDICINE

## 2018-11-24 PROCEDURE — 80307 DRUG TEST PRSMV CHEM ANLYZR: CPT | Performed by: FAMILY MEDICINE

## 2018-11-24 PROCEDURE — 90791 PSYCH DIAGNOSTIC EVALUATION: CPT

## 2018-11-24 PROCEDURE — 25000132 ZZH RX MED GY IP 250 OP 250 PS 637: Performed by: PHYSICIAN ASSISTANT

## 2018-11-24 PROCEDURE — 99285 EMERGENCY DEPT VISIT HI MDM: CPT | Mod: Z6 | Performed by: EMERGENCY MEDICINE

## 2018-11-24 PROCEDURE — A9270 NON-COVERED ITEM OR SERVICE: HCPCS | Performed by: INTERNAL MEDICINE

## 2018-11-24 RX ORDER — PRAMIPEXOLE DIHYDROCHLORIDE 0.12 MG/1
0.12 TABLET ORAL AT BEDTIME
COMMUNITY
End: 2019-01-14

## 2018-11-24 RX ORDER — OXYCODONE AND ACETAMINOPHEN 5; 325 MG/1; MG/1
1-2 TABLET ORAL EVERY 6 HOURS PRN
Status: DISCONTINUED | OUTPATIENT
Start: 2018-11-24 | End: 2018-12-06 | Stop reason: HOSPADM

## 2018-11-24 RX ORDER — LISINOPRIL 5 MG/1
5 TABLET ORAL DAILY
Status: DISCONTINUED | OUTPATIENT
Start: 2018-11-24 | End: 2018-12-06 | Stop reason: HOSPADM

## 2018-11-24 RX ORDER — LISINOPRIL 2.5 MG/1
2.5 TABLET ORAL DAILY
Status: DISCONTINUED | OUTPATIENT
Start: 2018-11-24 | End: 2018-11-24

## 2018-11-24 RX ORDER — TRAZODONE HYDROCHLORIDE 50 MG/1
50 TABLET, FILM COATED ORAL
Status: DISCONTINUED | OUTPATIENT
Start: 2018-11-24 | End: 2018-12-06 | Stop reason: HOSPADM

## 2018-11-24 RX ORDER — FLUTICASONE PROPIONATE 50 MCG
2 SPRAY, SUSPENSION (ML) NASAL DAILY
Status: DISCONTINUED | OUTPATIENT
Start: 2018-11-25 | End: 2018-12-06 | Stop reason: HOSPADM

## 2018-11-24 RX ORDER — ACETAMINOPHEN 325 MG/1
650 TABLET ORAL EVERY 4 HOURS PRN
Status: DISCONTINUED | OUTPATIENT
Start: 2018-11-24 | End: 2018-12-06 | Stop reason: HOSPADM

## 2018-11-24 RX ORDER — HYDROXYZINE HYDROCHLORIDE 25 MG/1
25 TABLET, FILM COATED ORAL 4 TIMES DAILY PRN
Status: DISCONTINUED | OUTPATIENT
Start: 2018-11-24 | End: 2018-12-06 | Stop reason: HOSPADM

## 2018-11-24 RX ORDER — OLANZAPINE 5 MG/1
5 TABLET ORAL
Status: DISCONTINUED | OUTPATIENT
Start: 2018-11-24 | End: 2018-12-06 | Stop reason: HOSPADM

## 2018-11-24 RX ORDER — GABAPENTIN 800 MG/1
800 TABLET ORAL 3 TIMES DAILY
Status: DISCONTINUED | OUTPATIENT
Start: 2018-11-24 | End: 2018-12-06 | Stop reason: HOSPADM

## 2018-11-24 RX ORDER — MULTIPLE VITAMINS W/ MINERALS TAB 9MG-400MCG
1 TAB ORAL DAILY
Status: ON HOLD | COMMUNITY
End: 2018-11-29

## 2018-11-24 RX ORDER — LABETALOL HYDROCHLORIDE 5 MG/ML
10 INJECTION, SOLUTION INTRAVENOUS ONCE
Status: COMPLETED | OUTPATIENT
Start: 2018-11-24 | End: 2018-11-24

## 2018-11-24 RX ORDER — METOPROLOL SUCCINATE 25 MG/1
25 TABLET, EXTENDED RELEASE ORAL DAILY
Status: DISCONTINUED | OUTPATIENT
Start: 2018-11-25 | End: 2018-12-06 | Stop reason: HOSPADM

## 2018-11-24 RX ORDER — DIPHENHYDRAMINE HYDROCHLORIDE 50 MG/ML
25 INJECTION INTRAMUSCULAR; INTRAVENOUS ONCE
Status: COMPLETED | OUTPATIENT
Start: 2018-11-24 | End: 2018-11-24

## 2018-11-24 RX ORDER — ALUMINA, MAGNESIA, AND SIMETHICONE 2400; 2400; 240 MG/30ML; MG/30ML; MG/30ML
30 SUSPENSION ORAL EVERY 4 HOURS PRN
Status: DISCONTINUED | OUTPATIENT
Start: 2018-11-24 | End: 2018-12-06 | Stop reason: HOSPADM

## 2018-11-24 RX ORDER — BISACODYL 10 MG
10 SUPPOSITORY, RECTAL RECTAL DAILY PRN
Status: DISCONTINUED | OUTPATIENT
Start: 2018-11-24 | End: 2018-12-06 | Stop reason: HOSPADM

## 2018-11-24 RX ORDER — SUMATRIPTAN 25 MG/1
50 TABLET, FILM COATED ORAL
Status: DISCONTINUED | OUTPATIENT
Start: 2018-11-24 | End: 2018-12-06 | Stop reason: HOSPADM

## 2018-11-24 RX ORDER — METOPROLOL SUCCINATE 25 MG/1
25 TABLET, EXTENDED RELEASE ORAL DAILY
Status: ON HOLD | COMMUNITY
End: 2018-11-29

## 2018-11-24 RX ORDER — KETOROLAC TROMETHAMINE 30 MG/ML
30 INJECTION, SOLUTION INTRAMUSCULAR; INTRAVENOUS ONCE
Status: COMPLETED | OUTPATIENT
Start: 2018-11-24 | End: 2018-11-24

## 2018-11-24 RX ORDER — METOPROLOL SUCCINATE 25 MG/1
25 TABLET, EXTENDED RELEASE ORAL DAILY
Status: DISCONTINUED | OUTPATIENT
Start: 2018-11-24 | End: 2018-11-24

## 2018-11-24 RX ORDER — NALOXONE HYDROCHLORIDE 0.4 MG/ML
.1-.4 INJECTION, SOLUTION INTRAMUSCULAR; INTRAVENOUS; SUBCUTANEOUS
Status: DISCONTINUED | OUTPATIENT
Start: 2018-11-24 | End: 2018-12-06 | Stop reason: HOSPADM

## 2018-11-24 RX ORDER — CARISOPRODOL 250 MG/1
250 TABLET ORAL 4 TIMES DAILY PRN
Status: DISCONTINUED | OUTPATIENT
Start: 2018-11-24 | End: 2018-11-25

## 2018-11-24 RX ORDER — MULTIPLE VITAMINS W/ MINERALS TAB 9MG-400MCG
1 TAB ORAL DAILY
Status: DISCONTINUED | OUTPATIENT
Start: 2018-11-25 | End: 2018-12-06 | Stop reason: HOSPADM

## 2018-11-24 RX ORDER — PRAMIPEXOLE DIHYDROCHLORIDE 0.12 MG/1
0.12 TABLET ORAL AT BEDTIME
Status: DISCONTINUED | OUTPATIENT
Start: 2018-11-24 | End: 2018-12-06 | Stop reason: HOSPADM

## 2018-11-24 RX ORDER — SODIUM CHLORIDE 9 MG/ML
1000 INJECTION, SOLUTION INTRAVENOUS CONTINUOUS
Status: DISCONTINUED | OUTPATIENT
Start: 2018-11-24 | End: 2018-11-24

## 2018-11-24 RX ORDER — OLANZAPINE 10 MG/2ML
5 INJECTION, POWDER, FOR SOLUTION INTRAMUSCULAR
Status: DISCONTINUED | OUTPATIENT
Start: 2018-11-24 | End: 2018-12-06 | Stop reason: HOSPADM

## 2018-11-24 RX ORDER — ROPINIROLE 0.25 MG/1
0.75 TABLET, FILM COATED ORAL AT BEDTIME
Status: DISCONTINUED | OUTPATIENT
Start: 2018-11-24 | End: 2018-12-06 | Stop reason: HOSPADM

## 2018-11-24 RX ORDER — CITALOPRAM HYDROBROMIDE 10 MG/1
20 TABLET ORAL DAILY
Status: DISCONTINUED | OUTPATIENT
Start: 2018-11-25 | End: 2018-11-25

## 2018-11-24 RX ADMIN — LISINOPRIL 5 MG: 5 TABLET ORAL at 16:52

## 2018-11-24 RX ADMIN — METOPROLOL SUCCINATE 25 MG: 25 TABLET, EXTENDED RELEASE ORAL at 16:52

## 2018-11-24 RX ADMIN — TOPIRAMATE 75 MG: 50 TABLET ORAL at 21:44

## 2018-11-24 RX ADMIN — PROCHLORPERAZINE EDISYLATE 10 MG: 5 INJECTION INTRAMUSCULAR; INTRAVENOUS at 15:25

## 2018-11-24 RX ADMIN — SUMATRIPTAN 50 MG: 25 TABLET, FILM COATED ORAL at 21:45

## 2018-11-24 RX ADMIN — DIPHENHYDRAMINE HYDROCHLORIDE 25 MG: 50 INJECTION, SOLUTION INTRAMUSCULAR; INTRAVENOUS at 17:36

## 2018-11-24 RX ADMIN — ROPINIROLE HYDROCHLORIDE 0.75 MG: 0.25 TABLET, COATED ORAL at 21:45

## 2018-11-24 RX ADMIN — OXYCODONE HYDROCHLORIDE AND ACETAMINOPHEN 1 TABLET: 5; 325 TABLET ORAL at 21:45

## 2018-11-24 RX ADMIN — GABAPENTIN 800 MG: 800 TABLET, FILM COATED ORAL at 21:44

## 2018-11-24 RX ADMIN — SODIUM CHLORIDE 1000 ML: 9 INJECTION, SOLUTION INTRAVENOUS at 15:25

## 2018-11-24 RX ADMIN — SODIUM CHLORIDE 1000 ML: 9 INJECTION, SOLUTION INTRAVENOUS at 16:56

## 2018-11-24 RX ADMIN — Medication 10 MG: at 16:52

## 2018-11-24 RX ADMIN — KETOROLAC TROMETHAMINE 30 MG: 30 INJECTION, SOLUTION INTRAMUSCULAR at 15:25

## 2018-11-24 RX ADMIN — PRAMIPEXOLE DIHYDROCHLORIDE 0.12 MG: 0.12 TABLET ORAL at 21:45

## 2018-11-24 ASSESSMENT — ENCOUNTER SYMPTOMS
NECK STIFFNESS: 0
ABDOMINAL PAIN: 0
DIARRHEA: 1
FEVER: 1
PHOTOPHOBIA: 1
VOMITING: 0
NECK PAIN: 0
ROS SKIN COMMENTS: POSITIVE FOR HIVES
HEADACHES: 1
NUMBNESS: 0
SEIZURES: 0

## 2018-11-24 ASSESSMENT — ACTIVITIES OF DAILY LIVING (ADL)
AMBULATION: 1-->ASSISTIVE EQUIPMENT
NUMBER_OF_TIMES_PATIENT_HAS_FALLEN_WITHIN_LAST_SIX_MONTHS: 3
FALL_HISTORY_WITHIN_LAST_SIX_MONTHS: YES
BATHING: 1-->ASSISTIVE EQUIPMENT
COGNITION: 0 - NO COGNITION ISSUES REPORTED
GROOMING: INDEPENDENT
RETIRED_EATING: 0-->INDEPENDENT
SWALLOWING: 0-->SWALLOWS FOODS/LIQUIDS WITHOUT DIFFICULTY
TOILETING: 1-->ASSISTIVE EQUIPMENT
ORAL_HYGIENE: INDEPENDENT
LAUNDRY: UNABLE TO COMPLETE
DRESS: 0-->INDEPENDENT
TRANSFERRING: 1-->ASSISTIVE EQUIPMENT
DRESS: INDEPENDENT
RETIRED_COMMUNICATION: 0-->UNDERSTANDS/COMMUNICATES WITHOUT DIFFICULTY

## 2018-11-24 NOTE — ED NOTES
Safety search completed by staff. Compliant with search. Belongings placed in storage. No UA collected.

## 2018-11-24 NOTE — IP AVS SNAPSHOT
MRN:[id number]                      After Visit Summary   11/24/2018    Lilliana Cardenas    MRN: [id number]           Thank you!     Thank you for choosing [facility] for your care. Our goal is always to provide you with excellent care.        Patient Information     Date Of Birth          1955        Designated Caregiver       Most Recent Value    Caregiver    Will someone help with your care after discharge? no      About your hospital stay     You were admitted on:  November 24, 2018 You last received care in the:  [facility]    You were discharged on:  December 6, 2018       Who to Call     For medical emergencies, please call 911.  For non-urgent questions about your medical care, please call your primary care provider or clinic, [phone]          Attending Provider     Provider Specialty    Marvin Robles MD Emergency Medicine    MikelPenelope dean MD Psychiatry    [facility], Nigel Bennett MD Psychiatry       Primary Care Provider Office Phone # Fax #    Bibi Chau [phone]      Your next 10 appointments already scheduled     Dec 10, 2018  1:00 PM CST   [facility]    [address] [facility] [id number] [phone]            Dec 10, 2018  1:45 PM CST   Radiology Injections with Maximiliano Sanchez MD   [facility] ([facility])    [address]  [facility] [id number] [phone]            Dec 18, 2018 11:30 AM CST   Return Visit with Maximiliano Sanchez MD   [facility] ([facility])    [address]  [facility] [id number] [phone]              Further instructions from your care team        Behavioral Discharge Planning and Instructions      Summary:  You were admitted on 11/24/2018  due to Depression and Suicidal  Ideations.  You were treated by Dr. Penelope Morin MD and discharged on 12/4/2018 from [facility] to [facility].  [facility]:   [address] [place] [address]  [phone]     Principal Diagnosis:    Major Depressive disorder Recurrent, Moderate      Health Care Follow-up Appointments:   PCP:  Bibi Chau  [facility]   [address] [place] [address]  Date: Monday, December 10th at 1:00 pm    Therapist:  John Campbell  [address] [place] [address]  [phone]  Date:  Wednesday, December 26th at 11:00am.  If you miss this appointment, you will not be able to reschedule again due to missed appointments..    [facility]:  [address] [address]   Phone: [phone]    Attend all scheduled appointments with your outpatient providers. Call at least 24 hours in advance if you need to reschedule an appointment to ensure continued access to your outpatient providers.   Major Treatments, Procedures and Findings:  You were provided with: a psychiatric assessment, assessed for medical stability, medication evaluation and/or management, group therapy and milieu management    Symptoms to Report: feeling more aggressive, increased confusion, losing more sleep, mood getting worse or thoughts of suicide    Early warning signs can include: increased depression or anxiety sleep disturbances increased thoughts or behaviors of suicide or self-harm  increased unusual thinking, such as paranoia or hearing voices    Safety and Wellness:  Take all medicines as directed.  Make no changes unless your doctor suggests them.      Follow treatment recommendations.  Refrain from alcohol and non-prescribed drugs.  If there is a concern for safety, call [phone].    Resources:   Crisis Intervention: [phone] or [phone] (TTY: [phone]).  Call anytime for help.  [facility] Crisis Response [phone]    The  "treatment team has appreciated the opportunity to work with you.     If you have any questions or concerns our unit number is [phone].          Pending Results     No orders found from 2018 to 2018.            Statement of Approval     Ordered          18 0513  I have reviewed and agree with all the recommendations and orders detailed in this document.  EFFECTIVE NOW     Approved and electronically signed by:  Nigel Melendez MD           18 0904  I have reviewed and agree with all the recommendations and orders detailed in this document.  EFFECTIVE NOW     Approved and electronically signed by:  Nigel Melendez MD             Admission Information     Date & Time Provider Department Dept. Phone    2018 Nigel Melendez MD [address] [phone]      Your Vitals Were     Blood Pressure Pulse Temperature Respirations Height Weight    132/64 99 97.8  F (36.6  C) (Tympanic) 16 1.575 m (5' 2\") 81.7 kg (180 lb 3.2 oz)    Pulse Oximetry BMI (Body Mass Index)                100% 32.96 kg/m2          [employer] Information     [employer] lets you send messages to your doctor, view your test results, renew your prescriptions, schedule appointments and more. To sign up, go to [email or web address] . Click on \"Log in\" on the left side of the screen, which will take you to the Welcome page. Then click on \"Sign up Now\" on the right side of the page.     You will be asked to enter the access code listed below, as well as some personal information. Please follow the directions to create your username and password.     Your access code is: [email or web address]  Expires: 2019  2:02 AM     Your access code will  in 90 days. If you need help or a new code, please call your [place] clinic or [phone].        Care EveryWhere ID     This is your Care EveryWhere ID. This could be used by other organizations to access your [place] medical records  [id number]        Equal Access to " Services     [facility]: Hadii mi Baird, waaxda luqadaha, qaybta kaalmajim ward, lina wu . So [facility] [phone].    ATENCIÓN: Si habla carla, tiene a lai disposición servicios gratuitos de asistencia lingüística. Llame al [phone].    We comply with applicable federal civil rights laws and [place] laws. We do not discriminate on the basis of race, color, national origin, age, disability, sex, sexual orientation, or gender identity.               Review of your medicines      START taking        Dose / Directions    albuterol 108 (90 Base) MCG/ACT inhaler   Commonly known as:  PROAIR HFA/PROVENTIL HFA/VENTOLIN HFA   Used for:  Mild intermittent asthma without complication        Dose:  2 puff   Inhale 2 puffs into the lungs 4 times daily as needed for other (dyspnea)   Quantity:  1 Inhaler   Refills:  1       DULoxetine 60 MG capsule   Commonly known as:  CYMBALTA   Used for:  Depression with anxiety        Dose:  60 mg   Take 1 capsule (60 mg) by mouth daily   Quantity:  30 capsule   Refills:  1       lisinopril 5 MG tablet   Commonly known as:  PRINIVIL/ZESTRIL   Used for:  Benign essential hypertension        Dose:  5 mg   Take 1 tablet (5 mg) by mouth daily   Quantity:  30 tablet   Refills:  0         CONTINUE these medicines which may have CHANGED, or have new prescriptions. If we are uncertain of the size of tablets/capsules you have at home, strength may be listed as something that might have changed.        Dose / Directions    gabapentin 800 MG tablet   Commonly known as:  NEURONTIN   This may have changed:  See the new instructions.   Used for:  Depression with anxiety, Chronic pain syndrome, Chronic low back pain, unspecified back pain laterality, with sciatica presence unspecified        Dose:  800 mg   Take 1 tablet (800 mg) by mouth 3 times daily   Quantity:  90 tablet   Refills:  0       topiramate 25 MG tablet   Commonly known as:  TOPAMAX    This may have changed:  medication strength   Used for:  Migraine without status migrainosus, not intractable, unspecified migraine type, Chronic pain syndrome        Dose:  75 mg   Take 3 tablets (75 mg) by mouth 3 times daily   Quantity:  270 tablet   Refills:  0       traZODone 50 MG tablet   Commonly known as:  DESYREL   This may have changed:  See the new instructions.   Used for:  Psychophysiological insomnia        Dose:  50 mg   Take 1 tablet (50 mg) by mouth nightly as needed for sleep   Quantity:  30 tablet   Refills:  0         CONTINUE these medicines which have NOT CHANGED        Dose / Directions    Carisoprodol 250 MG Tabs   Used for:  Chronic pain syndrome        Dose:  250 mg   Take 250 mg by mouth 4 times daily as needed   Quantity:  16 tablet   Refills:  0       fluticasone 50 MCG/ACT nasal spray   Commonly known as:  FLONASE   Used for:  Dry nose        Dose:  2 spray   Spray 2 sprays into both nostrils daily   Quantity:  1 Bottle   Refills:  0       hydrOXYzine 25 MG tablet   Commonly known as:  ATARAX   Used for:  Moderate major depression (H)        Dose:  25 mg   Take 1 tablet (25 mg) by mouth 4 times daily as needed for itching   Quantity:  120 tablet   Refills:  1       ketoprofen 10% in PLO 10% topical gel   Used for:  Arthritis        Dose:  2-4 g   Place 2-4 g onto the skin every 4 hours as needed for moderate pain   Quantity:  200 g   Refills:  1       metoprolol succinate ER 25 MG 24 hr tablet   Commonly known as:  TOPROL-XL   Used for:  Benign essential hypertension        Dose:  25 mg   Take 1 tablet (25 mg) by mouth daily   Quantity:  30 tablet   Refills:  0       multivitamin w/minerals tablet   Used for:  Vitamin deficiency        Dose:  1 tablet   Take 1 tablet by mouth daily   Quantity:  30 tablet   Refills:  0       omeprazole 20 MG DR capsule   Commonly known as:  priLOSEC   Used for:  Gastroesophageal reflux disease without esophagitis        Dose:  20 mg   Take 1 capsule  (20 mg) by mouth daily   Quantity:  30 capsule   Refills:  0       oxyCODONE-acetaminophen 5-325 MG tablet   Commonly known as:  PERCOCET   Used for:  Chronic pain syndrome        1-2 tabs every 6 hours as needed, ok to dispense on 11/14/18 and start 11/16/18   Quantity:  180 tablet   Refills:  0       pramipexole 0.125 MG tablet   Commonly known as:  MIRAPEX        Dose:  0.125 mg   Take 0.125 mg by mouth At Bedtime   Refills:  0       rOPINIRole 0.25 MG tablet   Commonly known as:  REQUIP   Used for:  Chronic pain syndrome        Dose:  0.75 mg   Take 3 tablets (0.75 mg) by mouth At Bedtime   Quantity:  270 tablet   Refills:  0       SUMAtriptan 50 MG tablet   Commonly known as:  IMITREX   Used for:  Chronic pain syndrome        Dose:  50 mg   Take 1 tablet (50 mg) by mouth at onset of headache May repeat in 2 hours if needed: max 2/day; average number of headaches monthly 4; Per [facility], this is a current medication   Quantity:  30 tablet   Refills:  0         STOP taking     citalopram 20 MG tablet   Commonly known as:  celeXA                Where to get your medicines      These medications were sent to [facility] [place], [place] - [address]  [address] [address]     Phone:  [phone]     albuterol 108 (90 Base) MCG/ACT inhaler    DULoxetine 60 MG capsule    fluticasone 50 MCG/ACT nasal spray    gabapentin 800 MG tablet    lisinopril 5 MG tablet    metoprolol succinate ER 25 MG 24 hr tablet    multivitamin w/minerals tablet    omeprazole 20 MG DR capsule    topiramate 25 MG tablet    traZODone 50 MG tablet         Some of these will need a paper prescription and others can be bought over the counter. Ask your nurse if you have questions.     Bring a paper prescription for each of these medications     Carisoprodol 250 MG Tabs                Protect others around you: Learn how to safely use, store and throw away your medicines at  www.disposemymeds.org.             Medication List: This is a list of all your medications and when to take them. Check marks below indicate your daily home schedule. Keep this list as a reference.      Medications           Morning Afternoon Evening Bedtime As Needed    albuterol 108 (90 Base) MCG/ACT inhaler   Commonly known as:  PROAIR HFA/PROVENTIL HFA/VENTOLIN HFA   Inhale 2 puffs into the lungs 4 times daily as needed for other (dyspnea)   Last time this was given:  2 puffs on 12/6/2018  8:31 AM                                Carisoprodol 250 MG Tabs   Take 250 mg by mouth 4 times daily as needed   Last time this was given:  350 mg on 12/6/2018  9:54 AM                                DULoxetine 60 MG capsule   Commonly known as:  CYMBALTA   Take 1 capsule (60 mg) by mouth daily   Last time this was given:  40 mg on 12/6/2018  8:32 AM                                fluticasone 50 MCG/ACT nasal spray   Commonly known as:  FLONASE   Spray 2 sprays into both nostrils daily   Last time this was given:  2 sprays on 12/6/2018  8:34 AM                                gabapentin 800 MG tablet   Commonly known as:  NEURONTIN   Take 1 tablet (800 mg) by mouth 3 times daily   Last time this was given:  800 mg on 12/6/2018  8:32 AM                                hydrOXYzine 25 MG tablet   Commonly known as:  ATARAX   Take 1 tablet (25 mg) by mouth 4 times daily as needed for itching   Last time this was given:  25 mg on 12/6/2018  2:54 AM                                ketoprofen 10% in PLO 10% topical gel   Place 2-4 g onto the skin every 4 hours as needed for moderate pain   Last time this was given:  11/25/2018 11:05 PM                                lisinopril 5 MG tablet   Commonly known as:  PRINIVIL/ZESTRIL   Take 1 tablet (5 mg) by mouth daily   Last time this was given:  5 mg on 12/6/2018  8:32 AM                                metoprolol succinate ER 25 MG 24 hr tablet   Commonly known as:  TOPROL-XL   Take 1  tablet (25 mg) by mouth daily   Last time this was given:  25 mg on 12/6/2018  8:32 AM                                multivitamin w/minerals tablet   Take 1 tablet by mouth daily   Last time this was given:  1 tablet on 12/6/2018  8:36 AM                                omeprazole 20 MG DR capsule   Commonly known as:  priLOSEC   Take 1 capsule (20 mg) by mouth daily   Last time this was given:  20 mg on 12/6/2018  8:32 AM                                oxyCODONE-acetaminophen 5-325 MG tablet   Commonly known as:  PERCOCET   1-2 tabs every 6 hours as needed, ok to dispense on 11/14/18 and start 11/16/18   Last time this was given:  2 tablets on 12/6/2018  9:54 AM                                pramipexole 0.125 MG tablet   Commonly known as:  MIRAPEX   Take 0.125 mg by mouth At Bedtime   Last time this was given:  0.125 mg on 12/5/2018 10:17 PM                                rOPINIRole 0.25 MG tablet   Commonly known as:  REQUIP   Take 3 tablets (0.75 mg) by mouth At Bedtime   Last time this was given:  0.75 mg on 12/5/2018 10:17 PM                                SUMAtriptan 50 MG tablet   Commonly known as:  IMITREX   Take 1 tablet (50 mg) by mouth at onset of headache May repeat in 2 hours if needed: max 2/day; average number of headaches monthly 4; Per [facility], this is a current medication   Last time this was given:  50 mg on 11/27/2018  2:22 PM                                topiramate 25 MG tablet   Commonly known as:  TOPAMAX   Take 3 tablets (75 mg) by mouth 3 times daily   Last time this was given:  75 mg on 12/6/2018  8:31 AM                                traZODone 50 MG tablet   Commonly known as:  DESYREL   Take 1 tablet (50 mg) by mouth nightly as needed for sleep   Last time this was given:  50 mg on 12/5/2018 10:20 PM

## 2018-11-24 NOTE — IP AVS SNAPSHOT
[facility]    [address]    [place] [address]    Phone:  [phone]                                       After Visit Summary   11/24/2018    Lilliana Cardenas    MRN: [id number]           After Visit Summary Signature Page     I have received my discharge instructions, and my questions have been answered. I have discussed any challenges I see with this plan with the nurse or doctor.    ..........................................................................................................................................  Patient/Patient Representative Signature      ..........................................................................................................................................  Patient Representative Print Name and Relationship to Patient    ..................................................               ................................................  Date                                   Time    ..........................................................................................................................................  Reviewed by Signature/Title    ...................................................              ..............................................  Date                                               Time          22EPIC Rev 08/18

## 2018-11-24 NOTE — ED PROVIDER NOTES
"  History     Chief Complaint   Patient presents with     Headache     for last 15 days, insurance won't cover medications and usually takes imitrex      Depression     feeling more depressed lately, recently evicted from her home     The history is provided by the patient and medical records.     Lilliana Cardenas is a 63 year old female. Per chart review, she has a history of chronic low back pain, s/p spinal cord stimulators placed in 2001 and in 2004 a newer model, s/p spinal surgeries (first in 1999, last in 2009), including a lumbar decompression and fusion, as well as epidural injections for pain management. The patient reports she takes percocet at this time for her pain. The patient also reports she was recently hospitalized and her medications were changed. She claims she thinks she needs a different medical and a better psychological evaluation.     Today, she presents to the Emergency Department for evaluation of a migraine and depression. The patient reports she normally takes Imitrex for her migraine symptoms, however, she ran out of her medications over a week ago.  The patient denies being prescribed any blood pressure medications.  The patient reports currently staying with a friend, however, she is currently homeless and working in a permanent housing solution.  She reports a significant amount of stress as of late and states \"the last couple of months have been difficult\".  The patient reports she used to have a therapist.  The patient reports she attempted suicide with pills.  At this time the patient states she has no plan to harm herself and has avoided taking her medications out of fear of overdosing.  The patient complains of photophobia, subjective fevers, hives, diarrhea, neuropathy, diffuse pain and \"achiness\".  The patient denies emesis, numbness, and tingling. The patient utilizes a wheel chair to get around.    I have reviewed the Medications, Allergies, Past Medical and Surgical " History, and Social History in the [facility] system.    Past Medical History:   Diagnosis Date     Anemia      Anxiety      Chronic pain syndrome      Depressive disorder      Diabetes mellitus (H)      Hypertension      Lower extremity neuropathy      Overdose of opiate or related narcotic (H) 12/2011       Past Surgical History:   Procedure Laterality Date     GYN SURGERY         No family history on file.    Social History   Substance Use Topics     Smoking status: Former Smoker     Packs/day: 1.00     Years: 38.00     Smokeless tobacco: Never Used      Comment: pt states she has smoked on & off for since she was 18     Alcohol use No       Current Facility-Administered Medications   Medication     0.9% sodium chloride BOLUS    Followed by     sodium chloride 0.9% infusion     ketorolac (TORADOL) injection 30 mg     prochlorperazine (COMPAZINE) injection 10 mg     Current Outpatient Prescriptions   Medication     citalopram (CELEXA) 20 MG tablet     CITALOPRAM HYDROBROMIDE PO     fluticasone (FLONASE) 50 MCG/ACT nasal spray     gabapentin (NEURONTIN) 800 MG tablet     hydrOXYzine (ATARAX) 25 MG tablet     ketoprofen 10% in PLO 10% topical gel     ketoprofen 10% in PLO 10%     OMEPRAZOLE PO     oxyCODONE-acetaminophen (PERCOCET) 5-325 MG per tablet     rOPINIRole (REQUIP) 0.25 MG tablet     topiramate (TOPAMAX) 50 MG tablet     traZODone (DESYREL) 50 MG tablet     Carisoprodol 250 MG TABS     FLUoxetine (PROZAC) 20 MG capsule     HYDROcodone-acetaminophen  MG per tablet     SUMAtriptan (IMITREX) 50 MG tablet      No Known Allergies    Review of Systems   Constitutional: Positive for fever (subjective).   Eyes: Positive for photophobia.   Gastrointestinal: Positive for diarrhea. Negative for abdominal pain and vomiting.   Musculoskeletal: Negative for neck pain and neck stiffness.   Skin:        Positive for hives   Neurological: Positive for headaches (migraine). Negative for seizures and numbness.         "Negative for \"tingling\"   All other systems reviewed and are negative.      Physical Exam   BP: (!) 182/119  Heart Rate: 91  Temp: 98.3  F (36.8  C)  Resp: 16  Weight: 74.8 kg (165 lb)  SpO2: 99 %      Physical Exam  Physical Exam   Constitutional: oriented to person, place, and time. appears well-developed and well-nourished.   HENT:   Head: Normocephalic and atraumatic.   Pupils are equal and reactive to light.  No nystagmus.  Neck: Normal range of motion.   Supple neck.  No tenderness palpation over the C-spine.  Pulmonary/Chest: Effort normal. No respiratory distress.   Cardiac: No murmurs, rubs, gallops. RRR.  Abdominal: Abdomen soft, nontender, nondistended. No rebound tenderness.  MSK: Long bones without deformity or evidence of trauma  Neurological: alert and oriented to person, place, and time.   Strength 5 out of 5 in upper extremities.  Sensation grossly intact.  Finger-nose-finger intact.  Skin: Skin is warm and dry.   Psychiatric:  normal mood and affect.  behavior is normal. Thought content normal.     ED Course   2:31 PM  The patient was seen and examined by Dr. Robles in Room ED17.     ED Course     Procedures         Labs Ordered and Resulted from Time of ED Arrival Up to the Time of Departure from the ED - No data to display         Assessments & Plan (with Medical Decision Making)   Differential includes migraine, and cranial hemorrhage, meningitis, encephalitis, suicidal ideation, depression      MDM and Plan  Patient with migraine and suicidal ideation.  Unlikely intracranial hemorrhage or infection due to well-appearing patient with normal neurologic exam.  Will treat for migraine and have a behavioral  see the patient.  Patient agrees with this plan.  Patient does have hypertension likely from pain, will treat headache and reassess. No need for labs thus far.    Re eval: Behavioral  will see patient regarding suicidal ideation.      I have reviewed the nursing notes.    I have " reviewed the findings, diagnosis, plan and need for follow up with the patient.    New Prescriptions    No medications on file       Final diagnoses:   Migraine  Suicidal ideation   IGerson, am serving as a trained medical scribe to document services personally performed by Marvin Robles MD, based on the provider's statements to me.      Marvin MCLEAN MD, was physically present and have reviewed and verified the accuracy of this note documented by Gerson Goldstein.     11/24/2018   [facility], [place], EMERGENCY DEPARTMENT     Marvin Robles MD  11/24/18 0406

## 2018-11-25 LAB
ALBUMIN SERPL-MCNC: 3.1 G/DL (ref 3.4–5)
ALBUMIN UR-MCNC: 10 MG/DL
ALP SERPL-CCNC: 98 U/L (ref 40–150)
ALT SERPL W P-5'-P-CCNC: 17 U/L (ref 0–50)
ANION GAP SERPL CALCULATED.3IONS-SCNC: 11 MMOL/L (ref 3–14)
APPEARANCE UR: CLEAR
AST SERPL W P-5'-P-CCNC: 11 U/L (ref 0–45)
BASOPHILS # BLD AUTO: 0 10E9/L (ref 0–0.2)
BASOPHILS NFR BLD AUTO: 0.2 %
BILIRUB SERPL-MCNC: 0.4 MG/DL (ref 0.2–1.3)
BILIRUB UR QL STRIP: NEGATIVE
BUN SERPL-MCNC: 10 MG/DL (ref 7–30)
CALCIUM SERPL-MCNC: 8.1 MG/DL (ref 8.5–10.1)
CHLORIDE SERPL-SCNC: 110 MMOL/L (ref 94–109)
CO2 SERPL-SCNC: 22 MMOL/L (ref 20–32)
COLOR UR AUTO: YELLOW
CREAT SERPL-MCNC: 0.71 MG/DL (ref 0.52–1.04)
DIFFERENTIAL METHOD BLD: NORMAL
EOSINOPHIL # BLD AUTO: 0.2 10E9/L (ref 0–0.7)
EOSINOPHIL NFR BLD AUTO: 1.9 %
ERYTHROCYTE [DISTWIDTH] IN BLOOD BY AUTOMATED COUNT: 12.6 % (ref 10–15)
GFR SERPL CREATININE-BSD FRML MDRD: 84 ML/MIN/1.7M2
GLUCOSE SERPL-MCNC: 96 MG/DL (ref 70–99)
GLUCOSE UR STRIP-MCNC: NEGATIVE MG/DL
HCT VFR BLD AUTO: 37 % (ref 35–47)
HGB BLD-MCNC: 12 G/DL (ref 11.7–15.7)
HGB UR QL STRIP: NEGATIVE
HYALINE CASTS #/AREA URNS LPF: 2 /LPF (ref 0–2)
IMM GRANULOCYTES # BLD: 0 10E9/L (ref 0–0.4)
IMM GRANULOCYTES NFR BLD: 0.1 %
KETONES UR STRIP-MCNC: NEGATIVE MG/DL
LEUKOCYTE ESTERASE UR QL STRIP: NEGATIVE
LYMPHOCYTES # BLD AUTO: 2.6 10E9/L (ref 0.8–5.3)
LYMPHOCYTES NFR BLD AUTO: 32 %
MCH RBC QN AUTO: 30.3 PG (ref 26.5–33)
MCHC RBC AUTO-ENTMCNC: 32.4 G/DL (ref 31.5–36.5)
MCV RBC AUTO: 93 FL (ref 78–100)
MONOCYTES # BLD AUTO: 0.4 10E9/L (ref 0–1.3)
MONOCYTES NFR BLD AUTO: 4.9 %
MUCOUS THREADS #/AREA URNS LPF: PRESENT /LPF
NEUTROPHILS # BLD AUTO: 4.9 10E9/L (ref 1.6–8.3)
NEUTROPHILS NFR BLD AUTO: 60.9 %
NITRATE UR QL: NEGATIVE
NRBC # BLD AUTO: 0 10*3/UL
NRBC BLD AUTO-RTO: 0 /100
PAIN DRUG SCR UR W RPTD MEDS: NORMAL
PH UR STRIP: 6 PH (ref 5–7)
PLATELET # BLD AUTO: 219 10E9/L (ref 150–450)
POTASSIUM SERPL-SCNC: 3.8 MMOL/L (ref 3.4–5.3)
PROT SERPL-MCNC: 6.8 G/DL (ref 6.8–8.8)
RBC # BLD AUTO: 3.96 10E12/L (ref 3.8–5.2)
RBC #/AREA URNS AUTO: <1 /HPF (ref 0–2)
SODIUM SERPL-SCNC: 143 MMOL/L (ref 133–144)
SOURCE: ABNORMAL
SP GR UR STRIP: 1.02 (ref 1–1.03)
SQUAMOUS #/AREA URNS AUTO: <1 /HPF (ref 0–1)
TSH SERPL DL<=0.005 MIU/L-ACNC: 1.45 MU/L (ref 0.4–4)
UROBILINOGEN UR STRIP-MCNC: NORMAL MG/DL (ref 0–2)
WBC # BLD AUTO: 8 10E9/L (ref 4–11)
WBC #/AREA URNS AUTO: 1 /HPF (ref 0–5)

## 2018-11-25 PROCEDURE — 99222 1ST HOSP IP/OBS MODERATE 55: CPT | Mod: AI | Performed by: PSYCHIATRY & NEUROLOGY

## 2018-11-25 PROCEDURE — 80053 COMPREHEN METABOLIC PANEL: CPT | Performed by: PSYCHIATRY & NEUROLOGY

## 2018-11-25 PROCEDURE — 84443 ASSAY THYROID STIM HORMONE: CPT | Performed by: PSYCHIATRY & NEUROLOGY

## 2018-11-25 PROCEDURE — A9270 NON-COVERED ITEM OR SERVICE: HCPCS | Performed by: PSYCHIATRY & NEUROLOGY

## 2018-11-25 PROCEDURE — 12400007 ZZH R&B MH INTERMEDIATE UMMC

## 2018-11-25 PROCEDURE — 25000132 ZZH RX MED GY IP 250 OP 250 PS 637: Performed by: PSYCHIATRY & NEUROLOGY

## 2018-11-25 PROCEDURE — 99207 ZZC CDG-MDM COMPONENT: MEETS LOW - DOWN CODED: CPT | Performed by: PSYCHIATRY & NEUROLOGY

## 2018-11-25 PROCEDURE — G0177 OPPS/PHP; TRAIN & EDUC SERV: HCPCS

## 2018-11-25 PROCEDURE — 81001 URINALYSIS AUTO W/SCOPE: CPT | Performed by: PSYCHIATRY & NEUROLOGY

## 2018-11-25 PROCEDURE — 25000132 ZZH RX MED GY IP 250 OP 250 PS 637: Performed by: PHYSICIAN ASSISTANT

## 2018-11-25 PROCEDURE — 99222 1ST HOSP IP/OBS MODERATE 55: CPT | Performed by: PHYSICIAN ASSISTANT

## 2018-11-25 PROCEDURE — A9270 NON-COVERED ITEM OR SERVICE: HCPCS | Performed by: PHYSICIAN ASSISTANT

## 2018-11-25 PROCEDURE — 85025 COMPLETE CBC W/AUTO DIFF WBC: CPT | Performed by: PSYCHIATRY & NEUROLOGY

## 2018-11-25 PROCEDURE — 36415 COLL VENOUS BLD VENIPUNCTURE: CPT | Performed by: PSYCHIATRY & NEUROLOGY

## 2018-11-25 PROCEDURE — 99207 ZZC CONSULT E&M CHANGED TO INITIAL LEVEL: CPT | Performed by: PHYSICIAN ASSISTANT

## 2018-11-25 RX ORDER — DULOXETIN HYDROCHLORIDE 30 MG/1
30 CAPSULE, DELAYED RELEASE ORAL DAILY
Status: DISCONTINUED | OUTPATIENT
Start: 2018-11-26 | End: 2018-11-27

## 2018-11-25 RX ORDER — CARISOPRODOL 350 MG/1
175-350 TABLET ORAL 4 TIMES DAILY PRN
Status: DISCONTINUED | OUTPATIENT
Start: 2018-11-25 | End: 2018-12-06 | Stop reason: HOSPADM

## 2018-11-25 RX ORDER — DULOXETIN HYDROCHLORIDE 20 MG/1
20 CAPSULE, DELAYED RELEASE ORAL DAILY
Status: COMPLETED | OUTPATIENT
Start: 2018-11-25 | End: 2018-11-25

## 2018-11-25 RX ADMIN — Medication 350 MG: at 17:52

## 2018-11-25 RX ADMIN — METOPROLOL SUCCINATE 25 MG: 25 TABLET, EXTENDED RELEASE ORAL at 08:48

## 2018-11-25 RX ADMIN — Medication 350 MG: at 21:57

## 2018-11-25 RX ADMIN — TOPIRAMATE 75 MG: 50 TABLET ORAL at 08:49

## 2018-11-25 RX ADMIN — DULOXETINE HYDROCHLORIDE 20 MG: 20 CAPSULE, DELAYED RELEASE ORAL at 11:59

## 2018-11-25 RX ADMIN — GABAPENTIN 800 MG: 800 TABLET, FILM COATED ORAL at 08:50

## 2018-11-25 RX ADMIN — MULTIPLE VITAMINS W/ MINERALS TAB 1 TABLET: TAB at 08:48

## 2018-11-25 RX ADMIN — TOPIRAMATE 75 MG: 50 TABLET ORAL at 21:56

## 2018-11-25 RX ADMIN — CITALOPRAM HYDROBROMIDE 20 MG: 10 TABLET ORAL at 08:48

## 2018-11-25 RX ADMIN — Medication: at 23:05

## 2018-11-25 RX ADMIN — ROPINIROLE HYDROCHLORIDE 0.75 MG: 0.25 TABLET, COATED ORAL at 21:56

## 2018-11-25 RX ADMIN — PRAMIPEXOLE DIHYDROCHLORIDE 0.12 MG: 0.12 TABLET ORAL at 21:56

## 2018-11-25 RX ADMIN — OXYCODONE HYDROCHLORIDE AND ACETAMINOPHEN 2 TABLET: 5; 325 TABLET ORAL at 16:19

## 2018-11-25 RX ADMIN — GABAPENTIN 800 MG: 800 TABLET, FILM COATED ORAL at 21:56

## 2018-11-25 RX ADMIN — OXYCODONE HYDROCHLORIDE AND ACETAMINOPHEN 2 TABLET: 5; 325 TABLET ORAL at 22:37

## 2018-11-25 RX ADMIN — GABAPENTIN 800 MG: 800 TABLET, FILM COATED ORAL at 14:51

## 2018-11-25 RX ADMIN — OXYCODONE HYDROCHLORIDE AND ACETAMINOPHEN 1 TABLET: 5; 325 TABLET ORAL at 08:58

## 2018-11-25 RX ADMIN — TRAZODONE HYDROCHLORIDE 50 MG: 50 TABLET ORAL at 21:57

## 2018-11-25 RX ADMIN — TOPIRAMATE 75 MG: 50 TABLET ORAL at 14:51

## 2018-11-25 RX ADMIN — OMEPRAZOLE 20 MG: 20 CAPSULE, DELAYED RELEASE ORAL at 08:50

## 2018-11-25 RX ADMIN — LISINOPRIL 5 MG: 5 TABLET ORAL at 08:49

## 2018-11-25 ASSESSMENT — ACTIVITIES OF DAILY LIVING (ADL)
DRESS: INDEPENDENT
ORAL_HYGIENE: INDEPENDENT
ORAL_HYGIENE: INDEPENDENT
LAUNDRY: UNABLE TO COMPLETE
LAUNDRY: UNABLE TO COMPLETE
DRESS: INDEPENDENT
DRESS: INDEPENDENT
GROOMING: INDEPENDENT
ORAL_HYGIENE: INDEPENDENT
LAUNDRY: UNABLE TO COMPLETE

## 2018-11-25 NOTE — CONSULTS
Internal Medicine Initial Visit    Lilliana Cardenas MRN# [id number]   Age: 63 year old YOB: 1955   Date of Admission: 11/24/2018    ADMIT DATE: 11/24/2018  DATE OF CONSULT: 11/25/2018    PCP: Bibi Chau    REQUESTING SERVICE: Geriatric psych  REASON FOR CONSULT: back pain    CHIEF COMPLAINT: back pain, depression, anxiety    HPI: Lilliana Cardenas is a 63 year old female with a past medical history of HTN, GERD, migraines, depression, anxiety, chronic back pain admitted to station 3B for depression, anxiety management.    The patient notes that she has had multiple increased life stressors in last couple of months. She is no longer talking to daughters. Lost her apt. Has no money, switched insurance and she is unable to fill meds as she has no money to do so. She notes she is homeless, living with friends and on the couch. She notes a month long history of a migraines as she was unable to  her medications, this is gone after medications in ER. She notes chronic low back pain, no new incontinence or change in character or pain. She notes continued tingling in her left leg that is chronic and has been present forever. No new fevers/chills, respiratory issues, abdominal pain or issues, chest pain.     ROS:   10 point ROS asked and otherwise negative, with exceptions as noted above in HPI.     PMH:  Past Medical History:   Diagnosis Date     Anemia      Anxiety      Chronic pain syndrome      Depressive disorder      Diabetes mellitus (H)      Hypertension      Lower extremity neuropathy      Overdose of opiate or related narcotic (H) 12/2011       PSH:  Past Surgical History:   Procedure Laterality Date     GYN SURGERY         MEDICATIONS    No current facility-administered medications on file prior to encounter.   Current Outpatient Prescriptions on File Prior to Encounter:  Carisoprodol 250 MG TABS Take 250 mg by mouth 4 times daily as needed   citalopram (CELEXA) 20 MG  "tablet Take 1 tablet (20 mg) by mouth daily   fluticasone (FLONASE) 50 MCG/ACT nasal spray 2 sprays by Both Nostrils route daily.   gabapentin (NEURONTIN) 800 MG tablet TAKE 1 TABLET BY MOUTH THREE TIMES A DAY   hydrOXYzine (ATARAX) 25 MG tablet Take 1 tablet (25 mg) by mouth 4 times daily as needed for itching   ketoprofen 10% in PLO 10% topical gel Place 2-4 g onto the skin every 4 hours as needed for moderate pain   oxyCODONE-acetaminophen (PERCOCET) 5-325 MG per tablet 1-2 tabs every 6 hours as needed, ok to dispense on 11/14/18 and start 11/16/18   rOPINIRole (REQUIP) 0.25 MG tablet Take 3 tablets (0.75 mg) by mouth At Bedtime   SUMAtriptan (IMITREX) 50 MG tablet Take 1 tablet (50 mg) by mouth at onset of headache May repeat in 2 hours if needed: max 2/day; average number of headaches monthly 4; Per [facility] [place], this is a current medication   topiramate (TOPAMAX) 50 MG tablet Take 1.5 tablets (75 mg) by mouth 3 times daily   traZODone (DESYREL) 50 MG tablet TAKE 1 TABLET BY MOUTH NIGHTLY AS NEEDED FOR SLEEP       ALLERGIES:   No Known Allergies    FAMILY HISTORY:  Reviewed and updated in [facility].    SOCIAL HISTORY:  Social History     Social History     Marital status:      Spouse name: N/A     Number of children: N/A     Years of education: N/A     Social History Main Topics     Smoking status: Former Smoker     Packs/day: 1.00     Years: 38.00     Smokeless tobacco: Never Used      Comment: pt states she has smoked on & off for since she was 18     Alcohol use No     Drug use: No     Sexual activity: Not Currently     Other Topics Concern     None     Social History Narrative   Grew up in [place], came [place] age of 19. Went to college at [employer]. Has 2 daughters, no contact.    PHYSICAL EXAM:  Blood pressure 129/73, pulse 73, temperature 97.8  F (36.6  C), temperature source Tympanic, resp. rate 16, height 1.575 m (5' 2\"), weight 75 kg (165 lb 6.4 oz), SpO2 98 %.    GENERAL: " Alert and orientated x 3. Appears in no acute distress.   HEENT: Anicteric sclera. Mucous membranes moist and without lesions.   CV: RRR, S1S2, no murmurs appreciated.  RESPIRATORY: Respirations regular, even, and unlabored. Lungs CTAB with no wheezing, rales, rhonchi.   GI: Soft and non distended with normoactive bowel sounds present in all quadrants. No tenderness, rebound, guarding.   EXTREMITIES: No peripheral edema. 2+ bilateral pedal pulses.   NEUROLOGIC: No focal deficits.   MUSCULOSKELETAL: No joint swelling or tenderness.   SKIN: No jaundice. No rashes or lesions.     LABS:  CMP    Recent Labs  Lab 11/25/18  0805      POTASSIUM 3.8   CHLORIDE 110*   CO2 22   ANIONGAP 11   GLC 96   BUN 10   CR 0.71   GFRESTIMATED 84   GFRESTBLACK >90   MOIZ 8.1*   PROTTOTAL 6.8   ALBUMIN 3.1*   BILITOTAL 0.4   ALKPHOS 98   AST 11   ALT 17     CBC    Recent Labs  Lab 11/25/18  0805   WBC 8.0   RBC 3.96   HGB 12.0   HCT 37.0   MCV 93   MCH 30.3   MCHC 32.4   RDW 12.6        INRNo lab results found in last 7 days.    IMAGING: None to review from this admission    ASSESSMENT & RECOMMENDATIONS:  #Depression, SI, anxiety: Utox negative. CMP, CBC, TSH, glucose. Endorses worsening depression.   -Management per psychiatry  #Chronic back pain: Follows with pain&palliative. Notes 5 back surgeries in past, chronic low back pain, left LE tingling that has been present for years. No new incontinence, worsening pain or saddle paresthesias. Plan for tapering of opiates at some point per 10/23/2018 note. Has been out for weeks per patient.   -Continue OP f/u with pain management  -Can continue PRN percocet from medicine standpoint w/ plans to wean as OP  -Continue gabapentin 800 mg TID  -Continue ketoprofen Q4 hours PRN  -Continue Topamax 75 mg TID  -Recommend holding Soma as this is typically used for short term therapy  -Continue tylenol PRN  -Has appt with pain/palliative tomorrow for chronic pain management, caudal steroid  injection- will need to reschedule on discharge  #Migraines: Continue Topamax and PRN Imitrex.   #HTN: PTA maintained on metoprolol 25 mg qday, lisinopril 5 mg qday. BP normal now on home meds. Can continue home BP meds.   #RLS: PTA maintained on Requip, Mirapex. Management per psychiatry.   #GERD: PTA on prilosec. Stable symptoms. Can continue.     I appreciate the opportunity to participate in the care of this patient. Medicine will sign off, please notify on call JANETT if any intercurrent medical issues arise.     Saritha Ybarra PA-C

## 2018-11-25 NOTE — PROGRESS NOTES
11/24/18 1918   Patient Belongings   Did you bring any home meds/supplements to the hospital?  Yes   Disposition of meds  Sent to security/pharmacy per site process   Patient Belongings cell phone/electronics;clothing;earings;glasses;jewelry;purse;ring;wallet   Disposition of Belongings Kept with patient;Locker;Sent to security per site process   Belongings Search Yes   Clothing Search Yes   Second Staff Tomeka Amezcua               Admission:  I am responsible for any personal items that are not sent to the safe or pharmacy.  [facility] is not responsible for loss, theft or damage of any property in my possession.  With pt: sweatshirt, slippers x2, shirt x2, pants x3, socks x4, silver earrings, watch, bra  Locker: phone x2, sweatshirt x3, envelope, backpack, bandana x2, purse x2, external hardrive x2, papers, glasses, headband, toiletries, lighters, electronic cigarette x2  Security[place], [place], [employer] x2, check x2, visa, mastercard  Signature:  _________________________________ Date: _______  Time: _____                                              Staff Signature:  ____________________________ Date: ________  Time: _____      2nd Staff person, if patient is unable/unwilling to sign:    Signature: ________________________________ Date: ________  Time: _____     Discharge:  Myesha has returned all of my personal belongings:    Signature: _________________________________ Date: ________  Time: _____                                          Staff Signature:  ____________________________ Date: ________  Time: _____

## 2018-11-25 NOTE — PROGRESS NOTES
Initial Psychosocial Assessment    I have reviewed the chart, met with the patient, and developed Care Plan.  Information for assessment was obtained from patient and chart notes.     Presenting Problem:  Patient was admitted on a voluntary basis with depression and suicidal ideation.  She has recently stopped taking some of her medications due to lack of insurance coverage.  She also has chronic pain, migraines, diabetes.  She uses a walker or wheelchair.  She was evicted from her home in August and has been staying with a friend since then.  She indicates that her apartment was in severe disrepair and she withheld rent in an effort to force repairs to be done but this did not work and she was evicted for non payment of rent.      History of Mental Health and Chemical Dependency:  Patient has a history of MDD and anxiety.  Prior mental health admissions, most recent at [facility] in September 2018 where her medications were changed.  Most recent [facility] admit was 2011.    Family Description ([facility], Family Psychiatric History):  Adult children are estranged from patient.  She is  with adult children.  No known mental illness or chemical dependency in family.    Significant Life Events (Illness, Abuse, Trauma, Death):  None     Living Situation:  Homeless, staying with a friend.    Educational Background:  AA degree in phlebotomy    Occupational History:  Disabled, has worked as a phlebotomist in the past.     Financial Status:  SSDI, rep payee in process.  Patient has had difficulty access her [employer] funds due to banking issues, lack of permanent address etc.  This has prevented her from being able to rent an apartment.      Legal Issues:  None noted    Ethnic/Cultural Issues:  None noted    Spiritual Orientation:  No comment by patient.     Service History:  None     Social Functioning (organization, interests):  Not assessed    Current Treatment Providers are:  PCP Bibi Chau at [place]  [facility]  No psychiatrist or therapist at this time.  Dr. Sanchez for pain mgmt.    Social Service Assessment/Plan:  Patient has been seen by the on-call psychiatrist.  Medications have been evaluated and adjusted as appropriate. Patient indicates she is trying to upgrade her health plan in order to get better prescription coverage through the end of this year. She indicates she has chosen a different plan for 2019 that will hopefully be better for her. Patient may be interested in connecting with a therapist and would prefer the [facility] as that is where she plans on moving once she is able to rent a place.  Patient will meet with the treatment team on Monday to further coordinate plan of care.  CTC available to assist as needed.

## 2018-11-25 NOTE — H&P
"Admitted:     11/24/2018      IDENTIFYING INFORMATION:  The patient is a 63-year-old female who is currently unemployed and homeless.  She prefers the name Ayaka.      CHIEF COMPLAINT:  \"I've been real depressed.\"      HISTORY OF PRESENT ILLNESS:  The patient has a history of major depressive disorder and chronic pain who presented to the emergency room voluntarily reporting depressed mood and suicidal ideation.  In the emergency room she reported experiencing thoughts to overdose on medications as a means of suicide.  She was not able to contract for safety if discharged from the emergency room citing that she was homeless and overwhelmed by psychosocial stressors.  Her urine drug screen was negative in the emergency room.  She was admitted to our Behavioral Health Unit voluntarily.        On examination today, she explains that over the past few months her mood has gradually become more depressed in the midst of various psychosocial stressors.  These stressors predominantly involve financial stress and unstable housing.  She explains that she was previously living in an assisted living facility; however, encountered some issues there that she does not care to discuss today which eventually led to her leaving the facility.  Since then her housing situation has been unstable and had been staying with a friend, however, was encountering some difficulties with that person as well.  Feeling helpless and hopeless in the midst of her unstable housing situation, she contemplated suicide as a means of escape, however, came to the emergency room to seek help instead.  She further adds that she was hospitalized for medical treatment at an outside facility a few months ago.  She had described feeling depressed and they had consulted with her psychiatrist who had recommended that the patient transition off of her antidepressants which included Prozac and Wellbutrin and transition on to Seroquel after being discharged home.  " She initially felt that the Seroquel was helpful; however, it quickly lost benefit and depressive symptoms resumed.  She stopped taking the Seroquel 2-3 weeks ago and her mood has been worsening throughout that time.      PSYCHIATRIC REVIEW OF SYSTEMS:  Mood is depressed, characterized as severe, accompanied with low energy, low appetite, low concentration, anhedonia, feeling helpless, hopeless.  Suicidal thoughts are present.  She is able to contract for safety on the unit.  Denies homicidal thoughts.  No symptoms corresponding to juan, no psychotic symptoms endorsed.  Regarding anxiety, she endorsed some anxiety secondary to her psychosocial stressors; however, did not seem to be related to a generalized anxiety or panic disorder.  No agoraphobia reported.  No symptoms corresponding to active PTSD, eating disorder or OCD.      PAST PSYCHIATRIC HISTORY:  This is the patient's third inpatient hospitalization with her last hospitalization occurring approximately 7 years ago.  She recalls 1 suicidal gesture in her 20s where she had taken a handful of pills impulsively, however, did not seek medical attention afterwards.  She denies that it was a serious suicide attempt.  No other suicide attempts endorsed.  No history of self-injurious behaviors reported.  She recalls a diagnosis of major depressive disorder with other past diagnoses including anxiety and PTSD.  She currently does not have any outpatient mental health providers.  Medication management is conducted with her nurse practitioner who was prescribing her a combination of Prozac and Wellbutrin until the medication was changed a couple of months ago as mentioned above to Seroquel.  Prior to that she had been on Celexa, Zoloft, Lexapro and Cymbalta.  She suspects that Cymbalta was discontinued due to lack of insurance coverage.      SUBSTANCE ABUSE HISTORY:  No reports of chemical abuse or addiction or prior admissions for detox or treatment.      PAST  MEDICAL HISTORY:  Chronic back pain with prior back surgeries, irritable bowel syndrome, migraine headaches, hypertension.      FAMILY HISTORY:  One of her daughters has been diagnosed with bipolar disorder.  No knowledge of suicides in the family.      SOCIAL HISTORY:  Refer to the psychosocial assessment completed by our .  She refers to herself as being homeless and was staying with a friend prior to her admission.  She vaguely reports some conflicts with that person and is not able to return there.  She is currently unemployed and on disability.  She is currently single and her children are of adult age.  Her father was described as being a heavy drinker.  No knowledge of suicides in the family.  The patient has been  in the past and is currently .  She has 2 daughters from her prior relationships who are of adult age.  She was residing in an assisted living facility; however, was more recently staying with a friend and is not able to return there.  No legal issues reported.      MEDICAL REVIEW OF SYSTEMS:  Ten-point systems were reviewed and were positive for psychiatric symptoms as noted above along with moderate back pain reported to be at baseline; otherwise negative.      PHYSICAL EXAMINATION:   VITAL SIGNS:  Blood pressure 129/73, heart rate 73, temperature 97.8, respirations 16, weight 165 pounds.   The rest of the physical examination was reviewed in the emergency room documentation.      MENTAL STATUS EXAMINATION:  The patient appears her stated age, appropriately dressed, fair hygiene, calm and cooperative.  No psychomotor abnormalities.  Eye contact was fair.  Speech was normal.  Mood is described as depressed.  Affect was blunted.  Thought process was linear and logical.  Thought content did not display evidence of psychosis.  She endorsed suicidal thoughts, denied homicidal thoughts.  Insight and judgment appear fair.  Cognition appeared intact to interviewing including  orientation to person, place, time and situation, use of language, fund of knowledge, recent and remote memory.  Muscle strength, tone and gait appear normal on visual inspection.      ASSESSMENT:  Major depressive disorder, recurrent, moderate.      PLAN:   1.  The patient has been admitted to our Behavioral Health Unit on station 3B under voluntary status for depressed mood and suicidal thoughts.  Her treatment team will resume her care tomorrow morning.   2.  The patient was agreeable to start Cymbalta to be introduced today at 20 mg daily and titrated up to 30 mg daily beginning tomorrow as we target reduction of depressive symptoms with possible added benefit of assisting in pain modulation in the setting of chronic pain.  Risks, benefits and side effects were reviewed and the patient consented to treatment.   3.  Psychosocial treatments to be addressed with social work consult and groups.   4.  Anticipate a hospital stay of approximately 1 week as we target improvement in mood and remission of suicidal thoughts.         STANISLAV PADILLA MD             D: 2018   T: 2018   MT: OCTAVIA      Name:     ROBERT BEAULIEU   MRN:      [id number]        Account:      [id number]   :      1955        Admitted:     2018                   Document: [id number]

## 2018-11-25 NOTE — PROGRESS NOTES
"Patient ambulates independently with walker with a steady and balanced gait. Encouraged hydration due to Orthostatc decrease  And encouraged to move slowly from lying position to sitting position and from sitting position to standing and wait a few minutes before walking after standing, states understanding. BP's better this afternoon. Percocet one tab given this am for discomforts with relief slowly. Refused Flonase this am, this writer encouraged patient to let staff know if she needs the Flonase or has any questions or concerns, states understanding. Informed patient of need for a urine specimen, states she \" needs to drink more but will let staff know when she is able\". Attending groups. Out in milieu and talks with a few peers. Appetite good. Slept 7 hours last night. Internal medicine came to see patient this am. States she \"Is hopeful to get help here.\"Denies SI/SIB. Rates Depression and Anxiety \"Less today than yesterday.\" Patient was able to urinate at 1517 and .Clean catch and midstream specimen obtained.  UA/UC sent to lab at 1525.  "

## 2018-11-25 NOTE — PHARMACY-ADMISSION MEDICATION HISTORY
Admission medication history for the November 24, 2018 admission is complete.     Interview sources:  Patient    Reliability of source: Patient was a good historian, she knew almost all medications and dosing    Medication compliance: Patient has poor compliance, has missed a few doses of most medications this week. She also has not filled her pramipexole in a month, but is says is still supposed to take it.    Preferred Outpatient Pharmacy: [facility] in [place]    Changes made to PTA medication list (reason)  Added: multivitamin-therapeutic with minerals (per patient), metoprolol succinate 25 mg (per patient), pramipexole 0.125 mg (per patient)  Deleted: fluoxetine 20 mg (per patient), hydrocodone-acetaminophen (per patient, is now taking oxycodone-acetaminophen) ketoprofen in PLO 10% (duplicate. Patient is taking the gel as needed)  Changed: omeprazole PO; take by mouth --> omeprazole 20 mg; take 1 capsule daily (per patient)    Additional medication history information:   1. Oxycodone-acetaminophen 325 mg   Patient last got prescription 11/19/18. She also filled 180 tablets at [facility] on 10/8/18.       Prior to Admission medications    Medication Sig Last Dose Taking? Auth Provider   Carisoprodol 250 MG TABS Take 250 mg by mouth 4 times daily as needed Past Month at n/a Yes Bibi Chau NP   citalopram (CELEXA) 20 MG tablet Take 1 tablet (20 mg) by mouth daily 11/23/2018 at am Yes Bibi Chau NP   fluticasone (FLONASE) 50 MCG/ACT nasal spray 2 sprays by Both Nostrils route daily. Past Week at n/a Yes Gilmar Kirby MD   gabapentin (NEURONTIN) 800 MG tablet TAKE 1 TABLET BY MOUTH THREE TIMES A DAY 11/22/2018 at pm Yes Bibi Chau NP   hydrOXYzine (ATARAX) 25 MG tablet Take 1 tablet (25 mg) by mouth 4 times daily as needed for itching 11/21/2018 at n/a Yes Marcello Marte MD   ketoprofen 10% in PLO 10% topical gel Place 2-4 g onto the skin every 4 hours as needed  for moderate pain Past Week at n/a Yes Maximiliano Sanchez MD   metoprolol succinate (TOPROL-XL) 25 MG 24 hr tablet Take 25 mg by mouth daily Past Week at n/a Yes Unknown, Entered By History   multivitamin, therapeutic with minerals (THERA-VIT-M) TABS tablet Take 1 tablet by mouth daily 11/21/2018 at am Yes Unknown, Entered By History   omeprazole (PRILOSEC) 20 MG CR capsule Take 20 mg by mouth daily 11/21/2018 at am Yes Unknown, Entered By History   oxyCODONE-acetaminophen (PERCOCET) 5-325 MG per tablet 1-2 tabs every 6 hours as needed, ok to dispense on 11/14/18 and start 11/16/18 11/21/2018 at n/a Yes Maximiliano Sanchez MD   pramipexole (MIRAPEX) 0.125 MG tablet Take 0.125 mg by mouth At Bedtime Past Month at pm Yes Unknown, Entered By History   rOPINIRole (REQUIP) 0.25 MG tablet Take 3 tablets (0.75 mg) by mouth At Bedtime 11/23/2018 at pm Yes Marcello Marte MD   SUMAtriptan (IMITREX) 50 MG tablet Take 1 tablet (50 mg) by mouth at onset of headache May repeat in 2 hours if needed: max 2/day; average number of headaches monthly 4; Per [facility] [place], this is a current medication Past Week at n/a Yes Bibi Chau NP   topiramate (TOPAMAX) 50 MG tablet Take 1.5 tablets (75 mg) by mouth 3 times daily 11/21/2018 at pm Yes Bibi Chau NP   traZODone (DESYREL) 50 MG tablet TAKE 1 TABLET BY MOUTH NIGHTLY AS NEEDED FOR SLEEP 11/19/2018 at pm Yes Bibi Chau NP         Time spent: 50 minutes    Medication history completed by:   Gunjan Paulson, Pharmacy Intern

## 2018-11-25 NOTE — PROGRESS NOTES
"63 year old female admitted from ER. Pt went to the ER due to increased depression and SI without plan/intent. Pt is homeless, has chronic pain, migraines and restless leg syndrome. Pt has not be taking her medications because she has not had the money to fill her prescriptions. Other stressors: pt was staying with friends and her children do not \"speak\" with her. Pt dehydrated and hypertensive in ER, given in ER: 1000 ml NS bolus, benadryl, Toradol, labetalol, lisinopril, toprol-xl, and compazine. Pt had an orthostatic drop on unit, see flow sheet, pt educated on orthostatic hypotension, staying hydrated and changing positions slowly, pt verbalized understanding. Pt reports she is in pain, PRN percocet give with relief reported.   Pt rates her depression at a 20/10 and anxiety at a 10/10 (10 being the worst). Pt is hopeful that medication management will help decrease both. Pt uses a walker and a wheelchair for long distance. Gait steady, balanced, slow. Will continue to monitor.   "

## 2018-11-25 NOTE — PROVIDER NOTIFICATION
Pt requested Carisoprodol, pharmacy doesn't carry prescribed strength, on-call provider notified, order dose changed.

## 2018-11-25 NOTE — PROGRESS NOTES
INITIAL OT NOTE  Pt attended a structured OT group with a focus on self-awareness and socialization. Pt appeared comfortable sharing positive personal information, and was respectful in listening and responding to peers. Pt was pleasant and engaged with a bright affect this date, and encouraged peers as needed throughout task. Will continue to assess, Pt will be given self-assessment form, and OT staff will explain the purpose of including them in their treatment plan and offer options for meeting their needs.

## 2018-11-26 PROCEDURE — A9270 NON-COVERED ITEM OR SERVICE: HCPCS | Performed by: PSYCHIATRY & NEUROLOGY

## 2018-11-26 PROCEDURE — G0177 OPPS/PHP; TRAIN & EDUC SERV: HCPCS

## 2018-11-26 PROCEDURE — 99232 SBSQ HOSP IP/OBS MODERATE 35: CPT | Performed by: PSYCHIATRY & NEUROLOGY

## 2018-11-26 PROCEDURE — 25000132 ZZH RX MED GY IP 250 OP 250 PS 637: Performed by: PSYCHIATRY & NEUROLOGY

## 2018-11-26 PROCEDURE — 12400007 ZZH R&B MH INTERMEDIATE UMMC

## 2018-11-26 RX ADMIN — TOPIRAMATE 75 MG: 50 TABLET ORAL at 08:27

## 2018-11-26 RX ADMIN — DULOXETINE HYDROCHLORIDE 30 MG: 30 CAPSULE, DELAYED RELEASE ORAL at 08:27

## 2018-11-26 RX ADMIN — Medication 350 MG: at 17:06

## 2018-11-26 RX ADMIN — OXYCODONE HYDROCHLORIDE AND ACETAMINOPHEN 1 TABLET: 5; 325 TABLET ORAL at 22:32

## 2018-11-26 RX ADMIN — GABAPENTIN 800 MG: 800 TABLET, FILM COATED ORAL at 08:27

## 2018-11-26 RX ADMIN — MULTIPLE VITAMINS W/ MINERALS TAB 1 TABLET: TAB at 08:27

## 2018-11-26 RX ADMIN — OXYCODONE HYDROCHLORIDE AND ACETAMINOPHEN 1 TABLET: 5; 325 TABLET ORAL at 06:53

## 2018-11-26 RX ADMIN — TOPIRAMATE 75 MG: 50 TABLET ORAL at 14:23

## 2018-11-26 RX ADMIN — TOPIRAMATE 75 MG: 50 TABLET ORAL at 21:28

## 2018-11-26 RX ADMIN — GABAPENTIN 800 MG: 800 TABLET, FILM COATED ORAL at 14:23

## 2018-11-26 RX ADMIN — ROPINIROLE HYDROCHLORIDE 0.75 MG: 0.25 TABLET, COATED ORAL at 21:28

## 2018-11-26 RX ADMIN — OMEPRAZOLE 20 MG: 20 CAPSULE, DELAYED RELEASE ORAL at 08:27

## 2018-11-26 RX ADMIN — GABAPENTIN 800 MG: 800 TABLET, FILM COATED ORAL at 21:28

## 2018-11-26 RX ADMIN — PRAMIPEXOLE DIHYDROCHLORIDE 0.12 MG: 0.12 TABLET ORAL at 21:28

## 2018-11-26 RX ADMIN — TRAZODONE HYDROCHLORIDE 50 MG: 50 TABLET ORAL at 22:32

## 2018-11-26 RX ADMIN — OXYCODONE HYDROCHLORIDE AND ACETAMINOPHEN 1 TABLET: 5; 325 TABLET ORAL at 07:03

## 2018-11-26 RX ADMIN — Medication 350 MG: at 07:03

## 2018-11-26 RX ADMIN — FLUTICASONE PROPIONATE 2 SPRAY: 50 SPRAY, METERED NASAL at 08:34

## 2018-11-26 RX ADMIN — OXYCODONE HYDROCHLORIDE AND ACETAMINOPHEN 1 TABLET: 5; 325 TABLET ORAL at 16:12

## 2018-11-26 ASSESSMENT — ACTIVITIES OF DAILY LIVING (ADL)
ORAL_HYGIENE: INDEPENDENT
ORAL_HYGIENE: INDEPENDENT
LAUNDRY: UNABLE TO COMPLETE
DRESS: INDEPENDENT
GROOMING: INDEPENDENT
GROOMING: INDEPENDENT
DRESS: INDEPENDENT

## 2018-11-26 NOTE — PROGRESS NOTES
"   11/26/18 1500   General Information   Special Considerations completed on 11-26-18   Clinical Impression   Affect Appropriate to situation   Orientation Oriented to person, place and time   Appearance and ADLs General cleanliness observed in most areas   Attention to Internal Stimuli No observed signs   Interaction Skills Initiates appropriately with staff;Initiates appropriately with peers   Ability to Communicate Needs Independent   Verbal Content Clear;Appropriate to topic   Ability to Maintain Boundaries Maintains appropriate physical boundaries;Maintains appropriate verbal boundaries   Participation Initiates participation   Concentration Concentrates 50 minutes   Ability to Concentrate Without difficulty   Follows and Comprehends Directions Independently follows 2 step verbal directions   Memory Delayed and immediate recall intact;Needs further assessment   Organization Independently organizes medium tasks   Decision Making Independent   Planning and Problem Solving Needs further assessment   Ability to Apply and Learn Concepts Applies within group structure   Frustrations / Stress Tolerance Independently identifies sources of frustration/stress   Level of Insight Insightful into needs, issues, goals   Self Esteem Can identify positives;Needs further assessment   Social Supports Has knowledge of support systems   General Observation/Plan   General Observations/Plan See Comments   Attended 2 of 3 OT groups, with missing a group while meeting with staff. She was pleasant, social, and affect brightened with interactions. She was quick to join in on arrival to group. She stated reason for admission as \"need help dealing with my housing and health plan situation. Changes she hopes for at time of discharge was \"be able to concentrate and follow a pattern and stick to it\". OT goals chosen to focus on included finish what she begins, improve concentration, organization, problem solving. Work was organized. She took " an active role in an activity requiring quick and creative thinking and offered multiple answers. Pt was given and completed a written self assessment. OT purpose was explained with the value of having involvement in treatment plan, and provided options to meet self identified goals. Plan:  Encourage attendance. Offer opportunity for success oriented, more complex task work, to build concentration, organization and problem solving skills. Provided opportunity to build stress management skills, and awareness of mental health and when to use strategies. Assess further and document.

## 2018-11-26 NOTE — PROGRESS NOTES
"PRNs this shift:  Percocet 2 tabs X 2 for pain  SOMA 350 mg x2 for muscle spasms  Trazodone for sleep    Pt reports she is happy with starting Cymbalta. Pt states \"there was good communication between me and the \". Denies SI/SIB. Pt active in milieu.   "

## 2018-11-26 NOTE — PLAN OF CARE
Problem: General Plan of Care (Inpatient Behavioral)  Goal: Individualization/Patient Specific Goal (IP Behavioral)  The patient and/or their representative will achieve their patient-specific goals related to the plan of care.    The patient-specific goals include:     Reasons you are in the hospital:  1)  Depression  2)  Migraines    Goals for Discharge:  1)  Find a place to live  2)  Need medical plan with drug coverage

## 2018-11-26 NOTE — PROGRESS NOTES
[facility], [place]   Psychiatric Progress Note        Interim History:   The patient's care was discussed with the treatment team during the daily team meeting and/or staff's chart notes were reviewed.  Staff report patient is bright, social and engaged. Visible and social with peers. Rated dep and anx low. No SI or SAM. No overt psychosis, juan or confusion. Compliant with medications and care. Eating and sleeping well. Independent with ADL.     The patient was pleasant and fully engaged. Depression and anxiety improved. Pain improved. Slept well and appetite intact. Tolerating medications well. Tolerating medications well. Focused on housing and ongoing financial stressors. She does not want to return to [facility] and hope that [facility] will help her get her own apt. The patient seemed to have very unrealistic expectation about disposition. She is also frustrated because her denture do not fit correctly and insurance will not approved getting fitted for new sets. She was advised to limit narcotic use.     Discussed medications and care plan.        Medications:       DULoxetine  30 mg Oral Daily     fluticasone  2 spray Both Nostrils Daily     gabapentin  800 mg Oral TID     lisinopril  5 mg Oral Daily     metoprolol succinate  25 mg Oral Daily     multivitamin, therapeutic with minerals  1 tablet Oral Daily     omeprazole  20 mg Oral Daily     pramipexole  0.125 mg Oral At Bedtime     rOPINIRole  0.75 mg Oral At Bedtime     topiramate  75 mg Oral TID          Allergies:   No Known Allergies       Labs:     Recent Results (from the past 24 hour(s))   UA with Microscopic reflex to Culture    Collection Time: 11/25/18  3:20 PM   Result Value Ref Range    Color Urine Yellow     Appearance Urine Clear     Glucose Urine Negative NEG^Negative mg/dL    Bilirubin Urine Negative NEG^Negative    Ketones Urine Negative NEG^Negative mg/dL    Specific Gravity Urine 1.021 1.003 - 1.035    Blood Urine  Negative NEG^Negative    pH Urine 6.0 5.0 - 7.0 pH    Protein Albumin Urine 10 (A) NEG^Negative mg/dL    Urobilinogen mg/dL Normal 0.0 - 2.0 mg/dL    Nitrite Urine Negative NEG^Negative    Leukocyte Esterase Urine Negative NEG^Negative    Source Midstream Urine     WBC Urine 1 0 - 5 /HPF    RBC Urine <1 0 - 2 /HPF    Squamous Epithelial /HPF Urine <1 0 - 1 /HPF    Mucous Urine Present (A) NEG^Negative /LPF    Hyaline Casts 2 0 - 2 /LPF          Psychiatric Examination:     Vitals:    11/25/18 1704 11/25/18 2139 11/26/18 0830 11/26/18 0835   BP:  122/59 116/56 94/55   Pulse:   74    Resp: 16 16 16    Temp:  98.7  F (37.1  C) 97.5  F (36.4  C)    TempSrc:  Tympanic Tympanic    SpO2:  98% 96%    Weight:       Height:                         Sitting Orthostatic BP: 116/56      Sitting Orthostatic Pulse: 74 bpm      Standing Orthostatic BP: 94/55      Standing Orthostatic Pulse: 81 bpm       Weight is 165 lbs 6.4 oz  Body mass index is 30.25 kg/(m^2).    Appearance: awake, alert, appeared older than stated age, well groomed and no apparent distress  Attitude:  cooperative  Eye Contact:  good  Mood:  better  Affect:  appropriate and in normal range, full range and reactive  Speech:  clear, coherent and normal prosody  Psychomotor Behavior:  no evidence of tardive dyskinesia, dystonia, or tics and intact station, gait and muscle tone  Throught Process:  linear and goal oriented  Associations:  no loose associations  Thought Content:  no evidence of suicidal ideation or homicidal ideation and no evidence of psychotic thought  Insight:  fair  Judgement:  intact  Oriented to:  time, person, and place  Attention Span and Concentration:  intact  Recent and Remote Memory:  intact         Precautions:     Behavioral Orders   Procedures     Code 1 - Restrict to Unit     Fall precautions     Routine Programming     As clinically indicated     Status 15     Every 15 minutes.     Suicide precautions     Patients on Suicide  Precautions should have a Combination Diet ordered that includes a Diet selection(s) AND a Behavioral Tray selection for Safe Tray - with utensils, or Safe Tray - NO utensils            Diagnoses:     Major depressive disorder, recurrent, moderate.         Plan:     Medications:  -- Cymbalta: started and titrated to 30 mg daily.   -- Gabapentin: continued at 800 mg TID.   -- Topamax: continued at 75 mg TID.   -- Requip: continued at 0.75 mg qhs.  -- Percocet: continued at 1-2 tab qid prn for severe pain but was advised to limit use.     Legal Status and Disposition:  -- volunt.   -- discharge will be granted once established mood stabilization, remission of SI and safety in the community.

## 2018-11-26 NOTE — PLAN OF CARE
Problem: General Plan of Care (Inpatient Behavioral)  Goal: Team Discussion  Team Plan:    BEHAVIORAL TEAM DISCUSSION    Participants: Heron Schmid DNP, Kristopher Callahan RN, FLIP Lozano, Zeenat Upton, CIRILO  Progress: New admit  Continued Stay Criteria/Rationale: Depression  Medical/Physical: See medical notes  Precautions:   Behavioral Orders   Procedures     Code 1 - Restrict to Unit     Fall precautions     Routine Programming     As clinically indicated     Status 15     Every 15 minutes.     Suicide precautions     Patients on Suicide Precautions should have a Combination Diet ordered that includes a Diet selection(s) AND a Behavioral Tray selection for Safe Tray - with utensils, or Safe Tray - NO utensils       Plan: The plan is to assess the patient for mental health and medication needs.  The patient will be prescribed   medications to treat the identified symptoms.  Upon discharge the patient will be referred to services as appropriate   based on the assessment.  Rationale for change in precautions or plan: N/A        Problem: Patient Care Overview  Goal: Team Discussion  Team Plan:    BEHAVIORAL TEAM DISCUSSION    Participants: Heron Schmid DNP, Kristopher Callahan RN, FLIP Lozano, Zeenat Upton, CIRILO  Progress: New admit  Continued Stay Criteria/Rationale: Depression  Medical/Physical: See medical notes  Precautions:   Behavioral Orders   Procedures     Code 1 - Restrict to Unit     Fall precautions     Routine Programming     As clinically indicated     Status 15     Every 15 minutes.     Suicide precautions     Patients on Suicide Precautions should have a Combination Diet ordered that includes a Diet selection(s) AND a Behavioral Tray selection for Safe Tray - with utensils, or Safe Tray - NO utensils       Plan: The plan is to assess the patient for mental health and medication needs.  The patient will be prescribed   medications to treat the identified symptoms.  Upon discharge the patient  will be referred to services as appropriate   based on the assessment.  Rationale for change in precautions or plan: N/A

## 2018-11-26 NOTE — PROGRESS NOTES
"Denies SI/SIB. Attending groups. States \"I am feeling hopeful with the medications and all that I have been learning here.\" Appetite good.\" Sleeping well.\" slept 7 hours last night. Independent in ambulation. BP meds held this am due to BP drop with standing. After medications that were given at 0703.   "

## 2018-11-27 PROCEDURE — 25000132 ZZH RX MED GY IP 250 OP 250 PS 637: Performed by: PSYCHIATRY & NEUROLOGY

## 2018-11-27 PROCEDURE — 25000132 ZZH RX MED GY IP 250 OP 250 PS 637: Performed by: PHYSICIAN ASSISTANT

## 2018-11-27 PROCEDURE — 99232 SBSQ HOSP IP/OBS MODERATE 35: CPT | Performed by: PSYCHIATRY & NEUROLOGY

## 2018-11-27 PROCEDURE — G0177 OPPS/PHP; TRAIN & EDUC SERV: HCPCS

## 2018-11-27 PROCEDURE — 12400007 ZZH R&B MH INTERMEDIATE UMMC

## 2018-11-27 RX ORDER — DULOXETIN HYDROCHLORIDE 20 MG/1
40 CAPSULE, DELAYED RELEASE ORAL DAILY
Status: DISCONTINUED | OUTPATIENT
Start: 2018-11-28 | End: 2018-12-06 | Stop reason: HOSPADM

## 2018-11-27 RX ADMIN — MULTIPLE VITAMINS W/ MINERALS TAB 1 TABLET: TAB at 08:37

## 2018-11-27 RX ADMIN — DULOXETINE HYDROCHLORIDE 30 MG: 30 CAPSULE, DELAYED RELEASE ORAL at 08:36

## 2018-11-27 RX ADMIN — Medication 350 MG: at 17:20

## 2018-11-27 RX ADMIN — ROPINIROLE HYDROCHLORIDE 0.75 MG: 0.25 TABLET, COATED ORAL at 22:32

## 2018-11-27 RX ADMIN — TRAZODONE HYDROCHLORIDE 50 MG: 50 TABLET ORAL at 22:41

## 2018-11-27 RX ADMIN — TOPIRAMATE 75 MG: 50 TABLET ORAL at 22:31

## 2018-11-27 RX ADMIN — OXYCODONE HYDROCHLORIDE AND ACETAMINOPHEN 1 TABLET: 5; 325 TABLET ORAL at 17:20

## 2018-11-27 RX ADMIN — Medication 350 MG: at 08:37

## 2018-11-27 RX ADMIN — SUMATRIPTAN 50 MG: 25 TABLET, FILM COATED ORAL at 14:22

## 2018-11-27 RX ADMIN — GABAPENTIN 800 MG: 800 TABLET, FILM COATED ORAL at 22:32

## 2018-11-27 RX ADMIN — PRAMIPEXOLE DIHYDROCHLORIDE 0.12 MG: 0.12 TABLET ORAL at 22:31

## 2018-11-27 RX ADMIN — TOPIRAMATE 75 MG: 50 TABLET ORAL at 14:22

## 2018-11-27 RX ADMIN — OMEPRAZOLE 20 MG: 20 CAPSULE, DELAYED RELEASE ORAL at 08:36

## 2018-11-27 RX ADMIN — FLUTICASONE PROPIONATE 2 SPRAY: 50 SPRAY, METERED NASAL at 08:39

## 2018-11-27 RX ADMIN — LISINOPRIL 5 MG: 5 TABLET ORAL at 08:36

## 2018-11-27 RX ADMIN — TOPIRAMATE 75 MG: 50 TABLET ORAL at 08:37

## 2018-11-27 RX ADMIN — GABAPENTIN 800 MG: 800 TABLET, FILM COATED ORAL at 08:36

## 2018-11-27 RX ADMIN — OXYCODONE HYDROCHLORIDE AND ACETAMINOPHEN 2 TABLET: 5; 325 TABLET ORAL at 08:36

## 2018-11-27 RX ADMIN — GABAPENTIN 800 MG: 800 TABLET, FILM COATED ORAL at 14:22

## 2018-11-27 RX ADMIN — Medication 350 MG: at 00:45

## 2018-11-27 RX ADMIN — HYDROXYZINE HYDROCHLORIDE 25 MG: 25 TABLET, FILM COATED ORAL at 00:47

## 2018-11-27 RX ADMIN — METOPROLOL SUCCINATE 25 MG: 25 TABLET, EXTENDED RELEASE ORAL at 08:37

## 2018-11-27 ASSESSMENT — ACTIVITIES OF DAILY LIVING (ADL)
ORAL_HYGIENE: INDEPENDENT
ORAL_HYGIENE: INDEPENDENT
DRESS: SCRUBS (BEHAVIORAL HEALTH)
GROOMING: INDEPENDENT
LAUNDRY: UNABLE TO COMPLETE
DRESS: SCRUBS (BEHAVIORAL HEALTH);INDEPENDENT
GROOMING: INDEPENDENT

## 2018-11-27 NOTE — PLAN OF CARE
Problem: Occupational Therapy Goals (Adult)  Goal: Occupational Therapy Goals  Will consistently attend OT groups and improve coping strategies with increasing repertoire of ideas and understanding of symptoms of when to use the strategies.    Attended 1 of 2 OT groups which was focused on the Process of Recovery, identifying healthy perspectives and ways in managing one's positive growth. She elaborated on others' comments, adding insightful ideas. She stated she receives strong support from her sister who supports Ayaka for being in the hospital and offers to help when she is home. She took an active role in speaking up, seemed interested and involved.

## 2018-11-27 NOTE — PLAN OF CARE
"Problem: Depressive Symptoms  Goal: Depressive Symptoms  Signs and symptoms of listed problems will be absent or manageable.   1. Pt will report that her feelings of depression have decreased to a manageable level.  2. Pt will contribute to discharge planning process.   3. Pt will participate in 75% of unit groups and activities.   4. Pt will report pain is at a tolerable level.    Outcome: Improving  48-Hour Nursing Assessment:    Patient is social with both peers and staff. She actively participating in the group programming. Patient denies SI/SIB nor hallucinations. She is med compliant. Patient sleeps good at night. According to her, financial constraints trigger her depression. Patient states that she feels bad because she is \"not paying attention on my business\". 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 Patient has limited support system. She says that she has two daughters who are successful in their field. One lives in [place], a [facility] but \"we are not talking much\". Her other daughter who is \"Director of Nursing\" in one of the facilities of [facility] has less time with her, They seldom communicate with each other.     Patient is med compliant. She is pleasant and cooperative. Her thoughts are organized. No racing thoughts reported. She complained of moderate back pains. Percocet 1 tablet given orally x 2 as prn for pain. Patient also was given SOMA x 1 for muscle spasms.   Trazodone 50 mgs given for sleep.      "

## 2018-11-27 NOTE — PROGRESS NOTES
"   11/27/18 1428   Behavioral Health   Hallucinations denies / not responding to hallucinations   Thinking intact   Orientation date: oriented;time: oriented;person: oriented;place: oriented   Memory baseline memory   Insight poor   Judgement intact   Eye Contact at examiner   Affect full range affect   Mood mood is calm;anxious   Physical Appearance/Attire attire appropriate to age and situation   Hygiene well groomed   Suicidality other (see comments)  (denied)   1. Wish to be Dead No   2. Non-Specific Active Suicidal Thoughts  No   Self Injury other (see comment)  (denied)   Speech coherent;clear   Psychomotor / Gait balanced;steady   Coping/Psychosocial   Verbalized Emotional State acceptance;anxiety   Plan Of Care Reviewed With patient   Patient Agreement with Plan of Care agrees   Psycho Education   Type of Intervention structured groups   Response participates, initiates socially appropriate   Hours 0.5   Activities of Daily Living   Hygiene/Grooming independent   Oral Hygiene independent   Dress scrubs (behavioral health);independent   Laundry unable to complete   Room Organization independent   Activity   Activity Assistance Provided independent   Behavioral Health Interventions   Depression maintain safety precautions;maintain safe secure environment   Social and Therapeutic Interventions (Depression) encourage socialization with peers;encourage participation in therapeutic groups and milieu activities     Pt denied SI/SIB/HI. Pt stated that her doctor talked about discharging her tomorrow and this triggered anxiety for her as well as a migraine. Pt stated that she feels overwhelmed with the amount of things she has to get done before she leaves the unit. Pt stated that her appetite and sleep are \"good\". No concerns or issues to report otherwise.   "

## 2018-11-27 NOTE — PROGRESS NOTES
[facility], [place]   Psychiatric Progress Note        Interim History:   The patient's care was discussed with the treatment team during the daily team meeting and/or staff's chart notes were reviewed.  Staff report patient is bright, social and engaged. Rated dep and anx low. Eating and sleeping well.  No SI or SAM. Focused on ongoing financial stressor and housing. Compliant with medications and care. No SI or SAM. No overt psychosis, juan or confusion. Compliant with medications and care. Independent with ADL.     Met patient with CTC. The patient endorsed anxiety but mainly in context of ongoing stressors. She denied dep, SI and SAM. No hallucinations or paranoia. Slept well. Appetite increased and eating well. Tolerating medications well and requested increase in Cymbalta. Focused on disposition and extending hospital stay. She noted that she plans to stay at a extended stay hotel while searching for an apt.     Discussed medications and care plan.        Medications:       DULoxetine  30 mg Oral Daily     fluticasone  2 spray Both Nostrils Daily     gabapentin  800 mg Oral TID     lisinopril  5 mg Oral Daily     metoprolol succinate  25 mg Oral Daily     multivitamin w/minerals  1 tablet Oral Daily     omeprazole  20 mg Oral Daily     pramipexole  0.125 mg Oral At Bedtime     rOPINIRole  0.75 mg Oral At Bedtime     topiramate  75 mg Oral TID          Allergies:   No Known Allergies       Labs:     No results found for this or any previous visit (from the past 24 hour(s)).       Psychiatric Examination:     Vitals:    11/26/18 0830 11/26/18 0835 11/26/18 1707 11/27/18 1000   BP: 116/56 94/55 131/56 136/56   Pulse: 74  70 84   Resp: 16  16 16   Temp: 97.5  F (36.4  C)  99.5  F (37.5  C) 97  F (36.1  C)   TempSrc: Tympanic  Oral Tympanic   SpO2: 96%   99%   Weight:       Height:                         Sitting Orthostatic BP: 116/56      Sitting Orthostatic Pulse: 74 bpm       Standing Orthostatic BP: 94/55      Standing Orthostatic Pulse: 81 bpm       Weight is 165 lbs 6.4 oz  Body mass index is 30.25 kg/(m^2).    Appearance: awake, alert, appeared older than stated age, well groomed and no apparent distress  Attitude:  cooperative  Eye Contact:  good  Mood:  anxious and better  Affect:  appropriate and in normal range, full range and reactive  Speech:  clear, coherent and normal prosody  Psychomotor Behavior:  no evidence of tardive dyskinesia, dystonia, or tics and intact station, gait and muscle tone  Throught Process:  linear and goal oriented  Associations:  no loose associations  Thought Content:  no evidence of suicidal ideation or homicidal ideation and no evidence of psychotic thought  Insight:  fair  Judgement:  intact  Oriented to:  time, person, and place  Attention Span and Concentration:  intact  Recent and Remote Memory:  intact         Precautions:     Behavioral Orders   Procedures     Code 1 - Restrict to Unit     Fall precautions     Routine Programming     As clinically indicated     Status 15     Every 15 minutes.     Suicide precautions     Patients on Suicide Precautions should have a Combination Diet ordered that includes a Diet selection(s) AND a Behavioral Tray selection for Safe Tray - with utensils, or Safe Tray - NO utensils            Diagnoses:     Major depressive disorder, recurrent, moderate.         Plan:     Medications:  -- Cymbalta: started and titrated to 40 mg daily.   -- Gabapentin: continued at 800 mg TID.   -- Topamax: continued at 75 mg TID.   -- Requip: continued at 0.75 mg qhs.  -- Percocet: continued at 1-2 tab qid prn for severe pain but was advised to limit use.     Legal Status and Disposition:  -- volunt.   -- discharge will be granted once established mood stabilization, remission of SI and safety in the community. Likely discharge in 1-2 days.

## 2018-11-28 ENCOUNTER — TELEPHONE (OUTPATIENT)
Dept: INTERNAL MEDICINE | Facility: CLINIC | Age: 63
End: 2018-11-28

## 2018-11-28 PROCEDURE — 12400007 ZZH R&B MH INTERMEDIATE UMMC

## 2018-11-28 PROCEDURE — 25000132 ZZH RX MED GY IP 250 OP 250 PS 637: Performed by: PHYSICIAN ASSISTANT

## 2018-11-28 PROCEDURE — 99232 SBSQ HOSP IP/OBS MODERATE 35: CPT | Performed by: PSYCHIATRY & NEUROLOGY

## 2018-11-28 PROCEDURE — 25000132 ZZH RX MED GY IP 250 OP 250 PS 637: Performed by: PSYCHIATRY & NEUROLOGY

## 2018-11-28 RX ADMIN — OXYCODONE HYDROCHLORIDE AND ACETAMINOPHEN 2 TABLET: 5; 325 TABLET ORAL at 17:08

## 2018-11-28 RX ADMIN — PRAMIPEXOLE DIHYDROCHLORIDE 0.12 MG: 0.12 TABLET ORAL at 23:08

## 2018-11-28 RX ADMIN — GABAPENTIN 800 MG: 800 TABLET, FILM COATED ORAL at 23:08

## 2018-11-28 RX ADMIN — TOPIRAMATE 75 MG: 50 TABLET ORAL at 08:49

## 2018-11-28 RX ADMIN — DULOXETINE HYDROCHLORIDE 40 MG: 20 CAPSULE, DELAYED RELEASE ORAL at 08:47

## 2018-11-28 RX ADMIN — Medication 350 MG: at 00:17

## 2018-11-28 RX ADMIN — FLUTICASONE PROPIONATE 2 SPRAY: 50 SPRAY, METERED NASAL at 08:48

## 2018-11-28 RX ADMIN — Medication 350 MG: at 08:53

## 2018-11-28 RX ADMIN — MULTIPLE VITAMINS W/ MINERALS TAB 1 TABLET: TAB at 08:49

## 2018-11-28 RX ADMIN — OMEPRAZOLE 20 MG: 20 CAPSULE, DELAYED RELEASE ORAL at 08:49

## 2018-11-28 RX ADMIN — METOPROLOL SUCCINATE 25 MG: 25 TABLET, EXTENDED RELEASE ORAL at 08:48

## 2018-11-28 RX ADMIN — ROPINIROLE HYDROCHLORIDE 0.75 MG: 0.25 TABLET, COATED ORAL at 23:08

## 2018-11-28 RX ADMIN — OXYCODONE HYDROCHLORIDE AND ACETAMINOPHEN 2 TABLET: 5; 325 TABLET ORAL at 23:55

## 2018-11-28 RX ADMIN — TOPIRAMATE 75 MG: 50 TABLET ORAL at 23:08

## 2018-11-28 RX ADMIN — TOPIRAMATE 75 MG: 50 TABLET ORAL at 14:26

## 2018-11-28 RX ADMIN — TRAZODONE HYDROCHLORIDE 50 MG: 50 TABLET ORAL at 23:10

## 2018-11-28 RX ADMIN — GABAPENTIN 800 MG: 800 TABLET, FILM COATED ORAL at 08:48

## 2018-11-28 RX ADMIN — Medication 350 MG: at 17:08

## 2018-11-28 RX ADMIN — LISINOPRIL 5 MG: 5 TABLET ORAL at 08:48

## 2018-11-28 RX ADMIN — Medication 350 MG: at 23:58

## 2018-11-28 RX ADMIN — GABAPENTIN 800 MG: 800 TABLET, FILM COATED ORAL at 14:26

## 2018-11-28 RX ADMIN — OXYCODONE HYDROCHLORIDE AND ACETAMINOPHEN 1 TABLET: 5; 325 TABLET ORAL at 08:53

## 2018-11-28 RX ADMIN — OXYCODONE HYDROCHLORIDE AND ACETAMINOPHEN 2 TABLET: 5; 325 TABLET ORAL at 00:17

## 2018-11-28 ASSESSMENT — ACTIVITIES OF DAILY LIVING (ADL)
ORAL_HYGIENE: INDEPENDENT
GROOMING: INDEPENDENT
DRESS: SCRUBS (BEHAVIORAL HEALTH)
ORAL_HYGIENE: INDEPENDENT
GROOMING: INDEPENDENT
DRESS: SCRUBS (BEHAVIORAL HEALTH)

## 2018-11-28 NOTE — PROGRESS NOTES
"Pt is present in the milieu. Pleasant and cooperative. Focused on \"doing the work my doctor wants me to get done.\"  On the phone calling insurance carriers and \"taking care of other business.\"  Rates anxiety and depression both at 8 out of 10 (10 worst).  States she feels overwhelmed with the tasks she needs to accomplish. Endorses fleeting passive SI - \"sometimes I think that maybe if I croaked in my sleep it wouldn't be so bad.\"  Denies active plan or urge for SI.    PRN Soma 350 mg x 1 for back pain  PRN Percocet 5-325 mg x 1 for back pain  PRN Trazodone at HS  "

## 2018-11-28 NOTE — TELEPHONE ENCOUNTER
[employer]  Statement of capability to manage benefits  Bibi Bird in basket   Mail to:  [employer]  [facility] [place] [address]  Send copy to abstraction

## 2018-11-28 NOTE — PROGRESS NOTES
"Pt spent the majority of the day on the phone trying to figure out discharge. She did not attend groups because she was occupied on the phone almost the entire shift. When checking in, pt stated that she has been very frustrated with her care here. In her frustration she said \"it's just hard because the people who are supposed to help me are too busy to.\" She stated she has frustration with not being heard and that she is doing everything that her CTC should be doing. Writer informed CTC about this and CTC immediately went out to talk to pt about her concerns and how she could best help. Pt expressed thoughts of not wanting to wake up in the morning, but said that they were not suicidal and she would never hurt herself. Nurse was informed about these thoughts. Pt denied SIB and AH/VH. Despite being overwhelmed, pt seemed to be in good spirits.      11/28/18 1427   Behavioral Health   Hallucinations denies / not responding to hallucinations   Thinking intact   Orientation person: oriented;place: oriented;date: oriented;time: oriented   Memory baseline memory   Insight insight appropriate to situation;insight appropriate to events   Judgement intact   Eye Contact at examiner   Affect full range affect   Mood (\"Frustrated\")   Physical Appearance/Attire attire appropriate to age and situation   Hygiene well groomed   Suicidality (Denies)   1. Wish to be Dead Yes   2. Non-Specific Active Suicidal Thoughts  No   Self Injury (Denies)   Elopement (Denies)   Activity (Active)   Speech clear;coherent   Medication Sensitivity no observed side effects;no stated side effects   Psychomotor / Gait slow;balanced;steady   Activities of Daily Living   Hygiene/Grooming independent   Oral Hygiene independent   Dress scrubs (behavioral health)   Room Organization independent   Snoia Morales on 11/28/2018 at 2:34 PM  "

## 2018-11-28 NOTE — PROGRESS NOTES
[facility], [place]   Psychiatric Progress Note        Interim History:     The patient's care was discussed with the treatment team during the daily team meeting and/or staff's chart notes were reviewed.  Staff report patient reported feeling depressed and anxious but affect is very incongruent. She is bright, social and engaged. Denied SI and SAM. Eating and sleeping well. Focused on ongoing financial stressor and housing. Compliant with medications and care.  No overt psychosis, juan or confusion. Compliant with medications and care. Independent with ADL.     Met patient with CTC. She was mainly focused on ongoing financial difficulties, housing issues and conflict with her daughter. She stated that she is feeling anxious as a result. She is tolerating medications well. No hallucinations, paranoia or racing thoughts. Eating well but c/o having difficulty with sleep because the back has not released sliding scale check yet. Future oriented and denied SI and SAM. The patient stated that she does not need refills for narcotics stating that she has a prescription filled by PCP and pain management provider.     Discussed medications and care plan.        Medications:       DULoxetine  40 mg Oral Daily     fluticasone  2 spray Both Nostrils Daily     gabapentin  800 mg Oral TID     lisinopril  5 mg Oral Daily     metoprolol succinate  25 mg Oral Daily     multivitamin w/minerals  1 tablet Oral Daily     omeprazole  20 mg Oral Daily     pramipexole  0.125 mg Oral At Bedtime     rOPINIRole  0.75 mg Oral At Bedtime     topiramate  75 mg Oral TID          Allergies:   No Known Allergies       Labs:     No results found for this or any previous visit (from the past 24 hour(s)).       Psychiatric Examination:     Vitals:    11/26/18 1707 11/27/18 1000 11/27/18 1626 11/28/18 0900   BP: 131/56 136/56 134/68 126/61   Pulse: 70 84 84 79   Resp: 16 16 16    Temp: 99.5  F (37.5  C) 97  F (36.1  C)   98.9  F (37.2  C)   TempSrc: Oral Tympanic  Tympanic   SpO2:  99%  99%   Weight:       Height:                         Sitting Orthostatic BP: 116/56      Sitting Orthostatic Pulse: 74 bpm      Standing Orthostatic BP: 94/55      Standing Orthostatic Pulse: 81 bpm       Weight is 165 lbs 6.4 oz  Body mass index is 30.25 kg/(m^2).    Appearance: awake, alert, appeared older than stated age, well groomed and no apparent distress  Attitude:  cooperative  Eye Contact:  good  Mood:  anxious and better  Affect:  appropriate and in normal range, intensity is dramatic and ranging from bright to tearfulness   Speech:  clear, coherent and normal prosody  Psychomotor Behavior:  no evidence of tardive dyskinesia, dystonia, or tics and intact station, gait and muscle tone  Throught Process:  linear and goal oriented  Associations:  no loose associations  Thought Content:  no evidence of suicidal ideation or homicidal ideation and no evidence of psychotic thought  Insight:  fair  Judgement:  intact  Oriented to:  time, person, and place  Attention Span and Concentration:  intact  Recent and Remote Memory:  intact         Precautions:     Behavioral Orders   Procedures     Code 1 - Restrict to Unit     Fall precautions     Routine Programming     As clinically indicated     Status 15     Every 15 minutes.     Suicide precautions     Patients on Suicide Precautions should have a Combination Diet ordered that includes a Diet selection(s) AND a Behavioral Tray selection for Safe Tray - with utensils, or Safe Tray - NO utensils            Diagnoses:     Major depressive disorder, recurrent, moderate.         Plan:     Medications:  -- Cymbalta: started and titrated to 40 mg daily.   -- Gabapentin: continued at 800 mg TID.   -- Topamax: continued at 75 mg TID.   -- Requip: continued at 0.75 mg qhs.  -- Percocet: continued at 1-2 tab qid prn for severe pain but was advised to limit use.     Legal Status and Disposition:  -- volunt.   --  discharge will be granted once established mood stabilization, remission of SI and safety in the community. Likely discharge in 1-2 days.

## 2018-11-28 NOTE — PROGRESS NOTES
Ayaka was awake in the lounge when night staff arrived. She was using her smartphone; evening staff had given her permission to do so. She was attempting to send a fax to her bank. Staff tried to assist her in her efforts; she seemed somewhat confused about how to send the fax, and she was easily distracted. After she seemed assured that she and staff had done all they can, she returned briefly to her room and staff placed her phone in her locker. She emerged from her room to try again to solve the issue; again there was no progress made and she returned to her room to sleep.

## 2018-11-29 PROCEDURE — 99232 SBSQ HOSP IP/OBS MODERATE 35: CPT | Performed by: PSYCHIATRY & NEUROLOGY

## 2018-11-29 PROCEDURE — 12400007 ZZH R&B MH INTERMEDIATE UMMC

## 2018-11-29 PROCEDURE — 25000132 ZZH RX MED GY IP 250 OP 250 PS 637: Performed by: PSYCHIATRY & NEUROLOGY

## 2018-11-29 PROCEDURE — G0177 OPPS/PHP; TRAIN & EDUC SERV: HCPCS

## 2018-11-29 PROCEDURE — 25000132 ZZH RX MED GY IP 250 OP 250 PS 637: Performed by: PHYSICIAN ASSISTANT

## 2018-11-29 RX ORDER — FLUTICASONE PROPIONATE 50 MCG
2 SPRAY, SUSPENSION (ML) NASAL DAILY
Qty: 1 BOTTLE | Refills: 0 | Status: SHIPPED | OUTPATIENT
Start: 2018-11-30 | End: 2019-05-09

## 2018-11-29 RX ORDER — METOPROLOL SUCCINATE 25 MG/1
25 TABLET, EXTENDED RELEASE ORAL DAILY
Qty: 30 TABLET | Refills: 0 | Status: SHIPPED | OUTPATIENT
Start: 2018-11-30 | End: 2019-01-23

## 2018-11-29 RX ORDER — DULOXETIN HYDROCHLORIDE 60 MG/1
60 CAPSULE, DELAYED RELEASE ORAL DAILY
Qty: 30 CAPSULE | Refills: 0 | Status: SHIPPED | OUTPATIENT
Start: 2018-12-05 | End: 2018-12-04

## 2018-11-29 RX ORDER — TOPIRAMATE 25 MG/1
75 TABLET, FILM COATED ORAL 3 TIMES DAILY
Qty: 270 TABLET | Refills: 0 | Status: SHIPPED | OUTPATIENT
Start: 2018-11-29 | End: 2019-05-09

## 2018-11-29 RX ORDER — MULTIPLE VITAMINS W/ MINERALS TAB 9MG-400MCG
1 TAB ORAL DAILY
Qty: 30 TABLET | Refills: 0 | Status: SHIPPED | OUTPATIENT
Start: 2018-11-30 | End: 2019-03-20

## 2018-11-29 RX ORDER — GABAPENTIN 800 MG/1
800 TABLET ORAL 3 TIMES DAILY
Qty: 90 TABLET | Refills: 0 | Status: SHIPPED | OUTPATIENT
Start: 2018-11-29 | End: 2019-01-17

## 2018-11-29 RX ORDER — TRAZODONE HYDROCHLORIDE 50 MG/1
50 TABLET, FILM COATED ORAL
Qty: 30 TABLET | Refills: 0 | Status: SHIPPED | OUTPATIENT
Start: 2018-11-29 | End: 2019-02-01

## 2018-11-29 RX ORDER — LISINOPRIL 5 MG/1
5 TABLET ORAL DAILY
Qty: 30 TABLET | Refills: 0 | Status: SHIPPED | OUTPATIENT
Start: 2018-11-30 | End: 2019-03-22

## 2018-11-29 RX ORDER — DULOXETINE 40 MG/1
40 CAPSULE, DELAYED RELEASE ORAL DAILY
Qty: 4 CAPSULE | Refills: 0 | Status: SHIPPED | OUTPATIENT
Start: 2018-11-30 | End: 2018-12-04

## 2018-11-29 RX ADMIN — MULTIPLE VITAMINS W/ MINERALS TAB 1 TABLET: TAB at 08:23

## 2018-11-29 RX ADMIN — GABAPENTIN 800 MG: 800 TABLET, FILM COATED ORAL at 13:59

## 2018-11-29 RX ADMIN — PRAMIPEXOLE DIHYDROCHLORIDE 0.12 MG: 0.12 TABLET ORAL at 22:22

## 2018-11-29 RX ADMIN — Medication 350 MG: at 08:27

## 2018-11-29 RX ADMIN — TOPIRAMATE 75 MG: 50 TABLET ORAL at 08:23

## 2018-11-29 RX ADMIN — OMEPRAZOLE 20 MG: 20 CAPSULE, DELAYED RELEASE ORAL at 08:23

## 2018-11-29 RX ADMIN — OXYCODONE HYDROCHLORIDE AND ACETAMINOPHEN 2 TABLET: 5; 325 TABLET ORAL at 17:04

## 2018-11-29 RX ADMIN — METOPROLOL SUCCINATE 25 MG: 25 TABLET, EXTENDED RELEASE ORAL at 08:23

## 2018-11-29 RX ADMIN — FLUTICASONE PROPIONATE 2 SPRAY: 50 SPRAY, METERED NASAL at 08:23

## 2018-11-29 RX ADMIN — ROPINIROLE HYDROCHLORIDE 0.75 MG: 0.25 TABLET, COATED ORAL at 22:23

## 2018-11-29 RX ADMIN — OXYCODONE HYDROCHLORIDE AND ACETAMINOPHEN 2 TABLET: 5; 325 TABLET ORAL at 08:27

## 2018-11-29 RX ADMIN — TOPIRAMATE 75 MG: 50 TABLET ORAL at 13:59

## 2018-11-29 RX ADMIN — TOPIRAMATE 75 MG: 50 TABLET ORAL at 22:22

## 2018-11-29 RX ADMIN — DULOXETINE HYDROCHLORIDE 40 MG: 20 CAPSULE, DELAYED RELEASE ORAL at 08:23

## 2018-11-29 RX ADMIN — LISINOPRIL 5 MG: 5 TABLET ORAL at 08:23

## 2018-11-29 RX ADMIN — GABAPENTIN 800 MG: 800 TABLET, FILM COATED ORAL at 08:23

## 2018-11-29 RX ADMIN — GABAPENTIN 800 MG: 800 TABLET, FILM COATED ORAL at 22:21

## 2018-11-29 RX ADMIN — TRAZODONE HYDROCHLORIDE 50 MG: 50 TABLET ORAL at 22:22

## 2018-11-29 RX ADMIN — Medication 350 MG: at 17:04

## 2018-11-29 ASSESSMENT — ACTIVITIES OF DAILY LIVING (ADL)
DRESS: SCRUBS (BEHAVIORAL HEALTH)
GROOMING: INDEPENDENT
ORAL_HYGIENE: INDEPENDENT

## 2018-11-29 NOTE — PROGRESS NOTES
"Pt present in the milieu.  Calm and cooperative.  Social with peers.  Rates anxiety and depression as \"the same.\"  Endorses fleeting passive SI - denies any active urge or plan at this time. Anxious regarding discharge.  Pt reports she is taking pain medication more than she normally does due to increase in back pain caused by the mattress she sleeps on here - \"I really do have pain - and it's bad because of the bed.\"  Soft care mattress is in place.    PRN Soma 350 mg and Percocet  mg at 1704 for back pain  PRN Trazodone 50 mg at HS  "

## 2018-11-29 NOTE — PROGRESS NOTES
Patient complained of low back pain and muscle spasms and requested for medications.Soma 350 mg. and Percocet 2 tabs were given @ 2358 PRN for muscle spasms and pain. Patient went to sleep after a few minutes and slept a total of 5 hours.

## 2018-11-29 NOTE — PLAN OF CARE
Problem: General Plan of Care (Inpatient Behavioral)  Goal: Discharge Planning  Outcome: Therapy, progress toward functional goals is gradual  Pt working on discontinue plans this day.

## 2018-11-29 NOTE — PROGRESS NOTES
Pt has been present for meals and is eating well. Pt has been attending groups. Pt has been working on DC plans. Pt denies suicidal ideation this shift.

## 2018-11-29 NOTE — PLAN OF CARE
Problem: Depressive Symptoms  Goal: Depressive Symptoms  Signs and symptoms of listed problems will be absent or manageable.   1. Pt will report that her feelings of depression have decreased to a manageable level.  2. Pt will contribute to discharge planning process.   3. Pt will participate in 75% of unit groups and activities.   4. Pt will report pain is at a tolerable level.    Outcome: Improving  Pt is calm and cooperative.  Affect is bright.  Endorses anxiety regarding discharge.  Endorses fleeting passive SI - a wish to not wake up in the morning.  Denies any active plan or urge at this time.  Appetite and sleep are good.  Showered this evening.      PRN Soma 375 mg and Percocet  x 1 for low to mid back pain  PRN Trazodone 50 mg at HS

## 2018-11-29 NOTE — PLAN OF CARE
Problem: Occupational Therapy Goals (Adult)  Goal: Occupational Therapy Goals  Will consistently attend OT groups and improve coping strategies with increasing repertoire of ideas and understanding of symptoms of when to use the strategies.     Attended 2 of 2 OT groups. Work was organized on a familiar step task.  In the am group, she expressed concern about discharge and wondering about plans of where she will go. In the afternoon OT group, she participated in an activity focused on Aftercare,  identiying support people, coping strategies, behaviors and red flags, affirmations and daily and weekly routines that are helpful with gaining balance. Answers were in context. She offered direct eye contact with speaker. She appeared comfortable and to make efforts in participating. Affect was bright with interactions seeming to appreciate topic and connection with peers.

## 2018-11-29 NOTE — PROGRESS NOTES
"  [facility], [place]   Psychiatric Progress Note        Interim History:     The patient's care was discussed with the treatment team during the daily team meeting and/or staff's chart notes were reviewed.  Staff report patient reported feeling depressed and anxious. Focused on disposition, financial stressors, ongoing conflict with family and lack of support. She feels hopeless and \"not want to live\", but no SI or SAM. Tearful at times but generally engaged and brighten. Social and attending groups. Eating and sleeping well.  Compliant with medications and care.  No overt psychosis, juan or confusion. Compliant with medications and care. Independent with ADL.     Met patient with RN. The patient noted feeling anxious, citing that she will not be able to care for self at homeless shelter. She is open to referral to crisis unit and center. Chronic pain persist, but improved with medications management. She does not need refills on pain medications. She acknowledged that depression has improved but hopes to discharge to Crisis for further stabilizations. She is feeling hopeless, but less intense and does not have current thoughts of SI or SAM. No hallucinations or racing thoughts. Ruminative thoughts improved. She is tolerating medications well and agreeable with proposed changes. Eating well. C/o having difficulty with sleep last night due to anxiety and ruminative thoughts.     Discussed medications and care plan.        Medications:       DULoxetine  40 mg Oral Daily     fluticasone  2 spray Both Nostrils Daily     gabapentin  800 mg Oral TID     lisinopril  5 mg Oral Daily     metoprolol succinate ER  25 mg Oral Daily     multivitamin w/minerals  1 tablet Oral Daily     omeprazole  20 mg Oral Daily     pramipexole  0.125 mg Oral At Bedtime     rOPINIRole  0.75 mg Oral At Bedtime     topiramate  75 mg Oral TID          Allergies:   No Known Allergies       Labs:     No results found " for this or any previous visit (from the past 24 hour(s)).       Psychiatric Examination:     Vitals:    11/28/18 0900 11/28/18 1639 11/28/18 1833 11/29/18 0900   BP: 126/61 118/63 117/62    Pulse: 79 85     Resp:  16  16   Temp: 98.9  F (37.2  C) 98.2  F (36.8  C)  97.8  F (36.6  C)   TempSrc: Tympanic Tympanic  Tympanic   SpO2: 99%   100%   Weight:    80.1 kg (176 lb 8 oz)   Height:                         Sitting Orthostatic BP: 116/56      Sitting Orthostatic Pulse: 74 bpm      Standing Orthostatic BP: 94/55      Standing Orthostatic Pulse: 81 bpm       Weight is 176 lbs 8 oz  Body mass index is 32.28 kg/(m^2).    Appearance: awake, alert, appeared older than stated age, well groomed and no apparent distress  Attitude:  cooperative  Eye Contact:  good  Mood:  anxious, depressed and better  Affect:  appropriate and in normal range, intensity is dramatic and ranging from bright to tearfulness   Speech:  clear, coherent and normal prosody  Psychomotor Behavior:  no evidence of tardive dyskinesia, dystonia, or tics and intact station, gait and muscle tone  Throught Process:  linear and goal oriented  Associations:  no loose associations  Thought Content:  no evidence of suicidal ideation or homicidal ideation, no evidence of psychotic thought and hopelessness less intnese and improving.   Insight:  fair  Judgement:  intact  Oriented to:  time, person, and place  Attention Span and Concentration:  intact  Recent and Remote Memory:  intact         Precautions:     Behavioral Orders   Procedures     Code 1 - Restrict to Unit     Fall precautions     Routine Programming     As clinically indicated     Status 15     Every 15 minutes.     Suicide precautions     Patients on Suicide Precautions should have a Combination Diet ordered that includes a Diet selection(s) AND a Behavioral Tray selection for Safe Tray - with utensils, or Safe Tray - NO utensils            Diagnoses:     Major depressive disorder, recurrent,  moderate.         Plan:     Medications:  -- Celexa was discontinued on admission.   -- Cymbalta: started and titrated to 40 mg daily. Plan to increase to 60 mg on 12/05.   -- Gabapentin: continued at 800 mg TID.   -- Topamax: continued at 75 mg TID.   -- Requip: continued at 0.75 mg qhs. No refills will be issued on discharge. Patient has 1 month supply  -- Percocet: continued at 1-2 tab qid prn for severe pain but was advised to limit use.  No refills or prescription will be issued on discharge. Patient started that she has refill at pharmacy.     Legal Status and Disposition:  -- volunt.   -- discharge will be granted once established mood stabilization, remission of SI and safety in the community. Refer to [facility] and other Crisis Units in the community for further stabilization.  Likely discharge in 1-2 days.     -- care will be transferred to Dr Melendez.

## 2018-11-30 PROCEDURE — 99232 SBSQ HOSP IP/OBS MODERATE 35: CPT | Performed by: PSYCHIATRY & NEUROLOGY

## 2018-11-30 PROCEDURE — 25000132 ZZH RX MED GY IP 250 OP 250 PS 637: Performed by: PSYCHIATRY & NEUROLOGY

## 2018-11-30 PROCEDURE — 25000132 ZZH RX MED GY IP 250 OP 250 PS 637: Performed by: PHYSICIAN ASSISTANT

## 2018-11-30 PROCEDURE — G0177 OPPS/PHP; TRAIN & EDUC SERV: HCPCS

## 2018-11-30 PROCEDURE — 12400007 ZZH R&B MH INTERMEDIATE UMMC

## 2018-11-30 RX ADMIN — Medication 350 MG: at 08:51

## 2018-11-30 RX ADMIN — ROPINIROLE HYDROCHLORIDE 0.75 MG: 0.25 TABLET, COATED ORAL at 22:40

## 2018-11-30 RX ADMIN — GABAPENTIN 800 MG: 800 TABLET, FILM COATED ORAL at 08:51

## 2018-11-30 RX ADMIN — OXYCODONE HYDROCHLORIDE AND ACETAMINOPHEN 2 TABLET: 5; 325 TABLET ORAL at 08:51

## 2018-11-30 RX ADMIN — LISINOPRIL 5 MG: 5 TABLET ORAL at 08:51

## 2018-11-30 RX ADMIN — TOPIRAMATE 75 MG: 50 TABLET ORAL at 08:51

## 2018-11-30 RX ADMIN — OMEPRAZOLE 20 MG: 20 CAPSULE, DELAYED RELEASE ORAL at 08:51

## 2018-11-30 RX ADMIN — OXYCODONE HYDROCHLORIDE AND ACETAMINOPHEN 2 TABLET: 5; 325 TABLET ORAL at 00:25

## 2018-11-30 RX ADMIN — Medication 350 MG: at 22:40

## 2018-11-30 RX ADMIN — Medication 350 MG: at 00:25

## 2018-11-30 RX ADMIN — TOPIRAMATE 75 MG: 50 TABLET ORAL at 22:40

## 2018-11-30 RX ADMIN — HYDROXYZINE HYDROCHLORIDE 25 MG: 25 TABLET, FILM COATED ORAL at 22:40

## 2018-11-30 RX ADMIN — DULOXETINE HYDROCHLORIDE 40 MG: 20 CAPSULE, DELAYED RELEASE ORAL at 08:51

## 2018-11-30 RX ADMIN — FLUTICASONE PROPIONATE 2 SPRAY: 50 SPRAY, METERED NASAL at 08:50

## 2018-11-30 RX ADMIN — MULTIPLE VITAMINS W/ MINERALS TAB 1 TABLET: TAB at 08:51

## 2018-11-30 RX ADMIN — GABAPENTIN 800 MG: 800 TABLET, FILM COATED ORAL at 22:41

## 2018-11-30 RX ADMIN — PRAMIPEXOLE DIHYDROCHLORIDE 0.12 MG: 0.12 TABLET ORAL at 22:40

## 2018-11-30 RX ADMIN — METOPROLOL SUCCINATE 25 MG: 25 TABLET, EXTENDED RELEASE ORAL at 08:51

## 2018-11-30 RX ADMIN — OXYCODONE HYDROCHLORIDE AND ACETAMINOPHEN 2 TABLET: 5; 325 TABLET ORAL at 17:34

## 2018-11-30 RX ADMIN — TOPIRAMATE 75 MG: 50 TABLET ORAL at 13:48

## 2018-11-30 RX ADMIN — GABAPENTIN 800 MG: 800 TABLET, FILM COATED ORAL at 13:48

## 2018-11-30 RX ADMIN — TRAZODONE HYDROCHLORIDE 50 MG: 50 TABLET ORAL at 22:40

## 2018-11-30 ASSESSMENT — ACTIVITIES OF DAILY LIVING (ADL)
LAUNDRY: UNABLE TO COMPLETE
ORAL_HYGIENE: INDEPENDENT
DRESS: SCRUBS (BEHAVIORAL HEALTH)
HYGIENE/GROOMING: INDEPENDENT

## 2018-11-30 NOTE — PROGRESS NOTES
"  [facility], [place]   Psychiatric Progress Note        Interim History:     The patient's care was discussed with the treatment team during the daily team meeting and/or staff's chart notes were reviewed.  Staff report patient is doing better. Eating better. Has been involved in the discharge planning.     The patient reports that she is \"not so good.\" Says that she had some incontinence yesterday and isn't sure where it came from. Mood is \"still depressed and anxious but better.\" Hasn't slept well the past two nights. Denies SI or HI. Denies AH or VH. Does say that the discharge \"situation is working out.\" Working on medication help through [facility].     Discussed medications and care plan.        Medications:       DULoxetine  40 mg Oral Daily     fluticasone  2 spray Both Nostrils Daily     gabapentin  800 mg Oral TID     lisinopril  5 mg Oral Daily     metoprolol succinate ER  25 mg Oral Daily     multivitamin w/minerals  1 tablet Oral Daily     omeprazole  20 mg Oral Daily     pramipexole  0.125 mg Oral At Bedtime     rOPINIRole  0.75 mg Oral At Bedtime     topiramate  75 mg Oral TID          Allergies:   No Known Allergies       Labs:     No results found for this or any previous visit (from the past 24 hour(s)).       Psychiatric Examination:     Vitals:    11/29/18 0900 11/29/18 2218 11/29/18 2223 11/30/18 0903   BP:  122/60 125/44 135/67   Pulse:    80   Resp: 16  16 16   Temp: 97.8  F (36.6  C)  96.8  F (36  C) 98.5  F (36.9  C)   TempSrc: Tympanic  Tympanic Oral   SpO2: 100%   100%   Weight: 80.1 kg (176 lb 8 oz)      Height:                         Sitting Orthostatic BP: 116/56      Sitting Orthostatic Pulse: 74 bpm      Standing Orthostatic BP: 94/55      Standing Orthostatic Pulse: 81 bpm       Weight is 176 lbs 8 oz  Body mass index is 32.28 kg/(m^2).    Appearance: awake, alert, appeared older than stated age, well groomed and no apparent " distress  Attitude:  cooperative  Eye Contact:  good  Mood:  anxious, depressed and better  Affect:  appropriate and in normal range  Speech:  clear, coherent and normal prosody  Psychomotor Behavior:  no evidence of tardive dyskinesia, dystonia, or tics and intact station, gait and muscle tone  Throught Process:  linear and goal oriented  Associations:  no loose associations  Thought Content:  no evidence of suicidal ideation or homicidal ideation and no evidence of psychotic thought  Insight:  fair  Judgement:  intact  Oriented to:  time, person, and place  Attention Span and Concentration:  intact  Recent and Remote Memory:  intact         Precautions:     Behavioral Orders   Procedures     Code 1 - Restrict to Unit     Fall precautions     Routine Programming     As clinically indicated     Status 15     Every 15 minutes.     Suicide precautions     Patients on Suicide Precautions should have a Combination Diet ordered that includes a Diet selection(s) AND a Behavioral Tray selection for Safe Tray - with utensils, or Safe Tray - NO utensils            Diagnoses:     Major depressive disorder, recurrent, moderate.         Plan:     Medications:  -- Celexa was discontinued on admission.   -- Cymbalta: started and titrated to 40 mg daily. Plan to increase to 60 mg on 12/05.   -- Gabapentin: continued at 800 mg TID.   -- Topamax: continued at 75 mg TID.   -- Requip: continued at 0.75 mg qhs. No refills will be issued on discharge. Patient has 1 month supply  -- Percocet: continued at 1-2 tab qid prn for severe pain but was advised to limit use.  No refills or prescription will be issued on discharge. Patient started that she has refill at pharmacy.     Legal Status and Disposition:  -- volunt.   -- discharge will be granted once established mood stabilization, remission of SI and safety in the community. Refer to [facility] and other Crisis Units in the community for further stabilization.  Likely discharge on  Monday.

## 2018-11-30 NOTE — PROGRESS NOTES
Patient complained of muscle spasms and lower back pain and requested for medications. Soma 350 mg. and Percocet 2 tabs given @ 0025 PRN for muscle spasms and pain. Slept comfortably after half an hour and slept a total of 6 hours.

## 2018-11-30 NOTE — PROGRESS NOTES
Pt was visible in [employer] and participating in groups. Pt stated she was depressed and anxious especially during the night. Pt stated she was working some of the things out with the system that has helped her focus her energy into positive things. Pt was able to have a good conversation, smiling and being animated while doing so. Pt stated she would not actively do anything to hurt herself, has no plans or intent but does with that if she were to fall asleep she wouldn't wake up again. Pt denies SiB/AH/VH.       11/30/18 1036   Behavioral Health   Hallucinations denies / not responding to hallucinations   Thinking intact   Orientation person: oriented;place: oriented;date: oriented;time: oriented   Memory baseline memory   Insight poor   Judgement intact   Eye Contact at examiner   Affect full range affect   Mood depressed  (brightens on approach)   Physical Appearance/Attire attire appropriate to age and situation   Hygiene well groomed   1. Wish to be Dead Yes   Wish to be Dead Description I wish I could sleep and not wake up   2. Non-Specific Active Suicidal Thoughts  No   Non-Specific Active Suicidal Thought Description No   Self Injury other (see comment)  (Pt denies)   Elopement (no concerning statements or behaviors)   Activity (active in [employer], and participating in groups)   Speech clear;coherent   Medication Sensitivity no observed side effects   Psychomotor / Gait slow;balanced   Activities of Daily Living   Hygiene/Grooming independent   Oral Hygiene independent   Dress scrubs (behavioral health)   Laundry unable to complete   Room Organization independent   Activity   Activity Assistance Provided independent   Behavioral Health Interventions   Depression maintain safety precautions;assist patient in developing safety plan;assist patient in following safety plan;encourage nutrition and hydration;assist with developing and utilizing healthy coping strategies;build upon strengths   Social and Therapeutic  Interventions (Depression) encourage socialization with peers;encourage effective boundaries with peers;encourage participation in therapeutic groups and milieu activities

## 2018-11-30 NOTE — PLAN OF CARE
Problem: Occupational Therapy Goals (Adult)  Goal: Occupational Therapy Goals  Will consistently attend OT groups and improve coping strategies with increasing repertoire of ideas and understanding of symptoms of when to use the strategies.     Attended 2 of 2 OT groups. She was social and supportive of others. She worked independently on a less complex task, seemed to appreciate time with peers and engaging in conversation. Affect was bright with some gentle teasing and joking with peers.

## 2018-12-01 PROCEDURE — 25000132 ZZH RX MED GY IP 250 OP 250 PS 637: Performed by: PSYCHIATRY & NEUROLOGY

## 2018-12-01 PROCEDURE — 12400007 ZZH R&B MH INTERMEDIATE UMMC

## 2018-12-01 PROCEDURE — 25000132 ZZH RX MED GY IP 250 OP 250 PS 637: Performed by: NURSE PRACTITIONER

## 2018-12-01 PROCEDURE — 25000132 ZZH RX MED GY IP 250 OP 250 PS 637: Performed by: PHYSICIAN ASSISTANT

## 2018-12-01 RX ORDER — ALBUTEROL SULFATE 90 UG/1
2 AEROSOL, METERED RESPIRATORY (INHALATION) 4 TIMES DAILY PRN
Status: DISCONTINUED | OUTPATIENT
Start: 2018-12-01 | End: 2018-12-06 | Stop reason: HOSPADM

## 2018-12-01 RX ORDER — GUAIFENESIN 600 MG/1
600 TABLET, EXTENDED RELEASE ORAL 2 TIMES DAILY
Status: DISCONTINUED | OUTPATIENT
Start: 2018-12-01 | End: 2018-12-06 | Stop reason: HOSPADM

## 2018-12-01 RX ADMIN — METOPROLOL SUCCINATE 25 MG: 25 TABLET, EXTENDED RELEASE ORAL at 08:12

## 2018-12-01 RX ADMIN — GUAIFENESIN 600 MG: 600 TABLET, EXTENDED RELEASE ORAL at 21:38

## 2018-12-01 RX ADMIN — GABAPENTIN 800 MG: 800 TABLET, FILM COATED ORAL at 08:12

## 2018-12-01 RX ADMIN — DULOXETINE HYDROCHLORIDE 40 MG: 20 CAPSULE, DELAYED RELEASE ORAL at 08:11

## 2018-12-01 RX ADMIN — MULTIPLE VITAMINS W/ MINERALS TAB 1 TABLET: TAB at 08:11

## 2018-12-01 RX ADMIN — TOPIRAMATE 75 MG: 50 TABLET ORAL at 15:07

## 2018-12-01 RX ADMIN — FLUTICASONE PROPIONATE 2 SPRAY: 50 SPRAY, METERED NASAL at 08:21

## 2018-12-01 RX ADMIN — LISINOPRIL 5 MG: 5 TABLET ORAL at 08:11

## 2018-12-01 RX ADMIN — OXYCODONE HYDROCHLORIDE AND ACETAMINOPHEN 2 TABLET: 5; 325 TABLET ORAL at 15:07

## 2018-12-01 RX ADMIN — OXYCODONE HYDROCHLORIDE AND ACETAMINOPHEN 2 TABLET: 5; 325 TABLET ORAL at 21:39

## 2018-12-01 RX ADMIN — HYDROXYZINE HYDROCHLORIDE 25 MG: 25 TABLET, FILM COATED ORAL at 21:44

## 2018-12-01 RX ADMIN — GABAPENTIN 800 MG: 800 TABLET, FILM COATED ORAL at 21:38

## 2018-12-01 RX ADMIN — Medication 350 MG: at 21:38

## 2018-12-01 RX ADMIN — ROPINIROLE HYDROCHLORIDE 0.75 MG: 0.25 TABLET, COATED ORAL at 21:38

## 2018-12-01 RX ADMIN — OMEPRAZOLE 20 MG: 20 CAPSULE, DELAYED RELEASE ORAL at 08:11

## 2018-12-01 RX ADMIN — Medication 350 MG: at 08:20

## 2018-12-01 RX ADMIN — OXYCODONE HYDROCHLORIDE AND ACETAMINOPHEN 2 TABLET: 5; 325 TABLET ORAL at 08:11

## 2018-12-01 RX ADMIN — ALBUTEROL SULFATE 2 PUFF: 90 AEROSOL, METERED RESPIRATORY (INHALATION) at 19:43

## 2018-12-01 RX ADMIN — GABAPENTIN 800 MG: 800 TABLET, FILM COATED ORAL at 15:07

## 2018-12-01 RX ADMIN — Medication 350 MG: at 15:07

## 2018-12-01 RX ADMIN — PRAMIPEXOLE DIHYDROCHLORIDE 0.12 MG: 0.12 TABLET ORAL at 21:38

## 2018-12-01 RX ADMIN — TOPIRAMATE 75 MG: 50 TABLET ORAL at 08:11

## 2018-12-01 RX ADMIN — TRAZODONE HYDROCHLORIDE 50 MG: 50 TABLET ORAL at 21:44

## 2018-12-01 RX ADMIN — TOPIRAMATE 75 MG: 50 TABLET ORAL at 21:38

## 2018-12-01 ASSESSMENT — ACTIVITIES OF DAILY LIVING (ADL)
GROOMING: INDEPENDENT
LAUNDRY: WITH SUPERVISION
DRESS: INDEPENDENT
LAUNDRY: WITH SUPERVISION
ORAL_HYGIENE: INDEPENDENT
GROOMING: INDEPENDENT
ORAL_HYGIENE: INDEPENDENT
DRESS: INDEPENDENT

## 2018-12-01 NOTE — PLAN OF CARE
Problem: Depressive Symptoms  Goal: Depressive Symptoms  Signs and symptoms of listed problems will be absent or manageable.   1. Pt will report that her feelings of depression have decreased to a manageable level.  2. Pt will contribute to discharge planning process.   3. Pt will participate in 75% of unit groups and activities.   4. Pt will report pain is at a tolerable level.    Outcome: Improving  Patient reported still feeling that she wants to be dead, however, she denied suicidal thoughts or intentions, she said she will never go through with suicidal thoughts. She contracted for safety on the unit. Patient rated her depression and anxiety at 9/10, said that's he is not feeling much better. However, patient has been very active on the unit, participated in unit activities and offered groups, social, with bright affect, laughing at times. Thoughts are linear and logical, speech fluent, insights are poor. Patient talked about her main stressors in life, including: poor support system (she has 2 daughters who are not talking to her and a sister who is very busy with her family now), patient is homeless and was unable to secure a place to live, she said that she has been without money as her SSD checks have been late. Patient is planning to get help with discharge to a shelter and further on she is hoping to darien a low income housing.   Patient's main concerns and focus today were: pain (PRN Percocet and Soma given), cough (NP updated, patient started on PRN Albuterol inh and Mucinex), and incontinence (incontinent products provided as per patient, she does not need other staff assist). Patient is using walker for safe ambulation, she is on falls precautions at this time, she said she feels safe and steady, walking with the walker and does not need other assist.

## 2018-12-01 NOTE — PROGRESS NOTES
Congestion:  Patient reported feeling congested. She said it was cold in her room early this morning and she has started coughing. Patient requested Mucinex. Later she was actinally observed to be coughing. None productive bronchial cough. Denied chest pain. Lung sounds were clear on auscultation. Temp 98.3, O2 sat 98% on room air, RR 18-20, /75. She said she has used an inhaler at home, not on her PTA list. Patient was sitting in the lounge playing cards with peers. Will update on call medical provider.   Addendum:   Patient has Mucinex ordered BID and Albuterol inh PRN. She stated she feels better.    Pain:   Patient's focus has been on back pain today. She had PRN Percocet 2 tabs and PRN Soma in am, she reported them as effective. Patient said that they help well when she takes them together. Patient is requesting another dose of both pain medications 6 hrs after the morning doses. Will follow up.

## 2018-12-01 NOTE — PROGRESS NOTES
"Patient is visible in the milieu socializing with peers. She is calm with a bright affect. Patient denies SI/SIB nor HIB however, she said that she still wish herself to be dead. Patient said that she got frustrated when none of her daughter called back despite of the messages she left thru their voicemail. Patient also reported that she is not sleeping good at night. She most of the time gets up in the middle of the night and walked to the [facility]. Patient is med compliant. She often requests for her prn meds for back pains. Patient also reported that she has both urinary and bowel incontinence and according to her \"it has been going on for several days now\". Patient stated that she reported this already. Patient's appetite is good. Percocet 2 tablets given as prn for moderate back pain. Trazodone 50 mgs and Hydroxyzine 25 mgs given as prn for sleep and anxiety respectively per patient's request.    Soma 350 mgs given; Hydroxyzine 25 mgs given as prn for muscle spasm and anxiety respectively at 10:40 p.m.  "

## 2018-12-02 PROCEDURE — G0177 OPPS/PHP; TRAIN & EDUC SERV: HCPCS

## 2018-12-02 PROCEDURE — 25000132 ZZH RX MED GY IP 250 OP 250 PS 637: Performed by: PHYSICIAN ASSISTANT

## 2018-12-02 PROCEDURE — 25000132 ZZH RX MED GY IP 250 OP 250 PS 637: Performed by: PSYCHIATRY & NEUROLOGY

## 2018-12-02 PROCEDURE — 12400007 ZZH R&B MH INTERMEDIATE UMMC

## 2018-12-02 PROCEDURE — 25000132 ZZH RX MED GY IP 250 OP 250 PS 637: Performed by: NURSE PRACTITIONER

## 2018-12-02 RX ADMIN — TOPIRAMATE 75 MG: 50 TABLET ORAL at 20:11

## 2018-12-02 RX ADMIN — ALBUTEROL SULFATE 2 PUFF: 90 AEROSOL, METERED RESPIRATORY (INHALATION) at 22:03

## 2018-12-02 RX ADMIN — METOPROLOL SUCCINATE 25 MG: 25 TABLET, EXTENDED RELEASE ORAL at 08:52

## 2018-12-02 RX ADMIN — GUAIFENESIN 600 MG: 600 TABLET, EXTENDED RELEASE ORAL at 20:11

## 2018-12-02 RX ADMIN — MULTIPLE VITAMINS W/ MINERALS TAB 1 TABLET: TAB at 08:52

## 2018-12-02 RX ADMIN — OMEPRAZOLE 20 MG: 20 CAPSULE, DELAYED RELEASE ORAL at 08:52

## 2018-12-02 RX ADMIN — GABAPENTIN 800 MG: 800 TABLET, FILM COATED ORAL at 08:52

## 2018-12-02 RX ADMIN — Medication 350 MG: at 16:24

## 2018-12-02 RX ADMIN — OXYCODONE HYDROCHLORIDE AND ACETAMINOPHEN 2 TABLET: 5; 325 TABLET ORAL at 21:52

## 2018-12-02 RX ADMIN — OXYCODONE HYDROCHLORIDE AND ACETAMINOPHEN 2 TABLET: 5; 325 TABLET ORAL at 08:52

## 2018-12-02 RX ADMIN — TOPIRAMATE 75 MG: 50 TABLET ORAL at 13:49

## 2018-12-02 RX ADMIN — FLUTICASONE PROPIONATE 2 SPRAY: 50 SPRAY, METERED NASAL at 08:53

## 2018-12-02 RX ADMIN — Medication 350 MG: at 21:51

## 2018-12-02 RX ADMIN — GABAPENTIN 800 MG: 800 TABLET, FILM COATED ORAL at 20:13

## 2018-12-02 RX ADMIN — DULOXETINE HYDROCHLORIDE 40 MG: 20 CAPSULE, DELAYED RELEASE ORAL at 08:52

## 2018-12-02 RX ADMIN — Medication 350 MG: at 08:52

## 2018-12-02 RX ADMIN — TOPIRAMATE 75 MG: 50 TABLET ORAL at 08:52

## 2018-12-02 RX ADMIN — ROPINIROLE HYDROCHLORIDE 0.75 MG: 0.25 TABLET, COATED ORAL at 21:49

## 2018-12-02 RX ADMIN — GUAIFENESIN 600 MG: 600 TABLET, EXTENDED RELEASE ORAL at 08:52

## 2018-12-02 RX ADMIN — ALBUTEROL SULFATE 2 PUFF: 90 AEROSOL, METERED RESPIRATORY (INHALATION) at 08:27

## 2018-12-02 RX ADMIN — HYDROXYZINE HYDROCHLORIDE 25 MG: 25 TABLET, FILM COATED ORAL at 21:49

## 2018-12-02 RX ADMIN — OXYCODONE HYDROCHLORIDE AND ACETAMINOPHEN 2 TABLET: 5; 325 TABLET ORAL at 16:21

## 2018-12-02 RX ADMIN — TRAZODONE HYDROCHLORIDE 50 MG: 50 TABLET ORAL at 21:49

## 2018-12-02 RX ADMIN — LISINOPRIL 5 MG: 5 TABLET ORAL at 08:52

## 2018-12-02 RX ADMIN — GABAPENTIN 800 MG: 800 TABLET, FILM COATED ORAL at 13:49

## 2018-12-02 RX ADMIN — PRAMIPEXOLE DIHYDROCHLORIDE 0.12 MG: 0.12 TABLET ORAL at 21:49

## 2018-12-02 ASSESSMENT — ACTIVITIES OF DAILY LIVING (ADL)
GROOMING: INDEPENDENT
LAUNDRY: UNABLE TO COMPLETE
ORAL_HYGIENE: INDEPENDENT
LAUNDRY: UNABLE TO COMPLETE
ORAL_HYGIENE: INDEPENDENT
GROOMING: INDEPENDENT
DRESS: INDEPENDENT
DRESS: INDEPENDENT

## 2018-12-02 NOTE — PROGRESS NOTES
Back Pain: controlled with PRN Percocet and Soma.  Falls precautions: patient safe from falls and injury using walker.   Sleep promoting PRN Hydroxyzine and Trazodone given at HS per request.   Incontinence: patient is comfortable with self hygiene, given incontinent products as requested.   Behavior: appropriate, acitve, social.  Psych: still reported wishing to be dead with high depression and anxiety, contacts for safety, denied SI, SIB, HI, or hallucinations.

## 2018-12-02 NOTE — PROGRESS NOTES
Pt was bright and social on the unit this evening. Pt was active and supportive in group. Pt verbalizes 9/10 anxiety, but does not outwardly show any signs of anxiety. Pt states that she wishes she would die, but does not think about killing herself. Pt stated she had a particularly hard day as her daughter did not call her and she wishes that she would. She states that left a message for her daughter on her daughter's birthday recently with no response. Pt's sister called today and confirmed that pt's daughter knows she is here, but she still has not called. Pt reports that she is homeless and will be going to a transitional housing for a short time after discharge. Pt states she has not slept in three whole nights, at all. Pt reports that she can go about a week without sleeping at all. Pt has been eating well while on the unit. Pt denies SIB, HI, AH, or VH.      12/01/18 2200   Cognitive/Neuro/Behavioral WDL   Cognitive/Neuro/Behavioral WDL ex;mood/behavior   Level Of Consciousness alert   Arousal Level opens eyes spontaneously   Orientation oriented x 4   Speech clear   Mood/Behavior anxious   Behavioral Health   Thoughts/Cognition (WDL) ex;insight;judgement;thinking   Hallucinations denies / not responding to hallucinations   Thinking intact   Orientation person: oriented;place: oriented;date: oriented;time: oriented   Memory baseline memory   Insight poor   Judgement impaired   Eye Contact at examiner   Affect/Mood (WDL) Ex   Affect incongruent   Mood mood is calm   ADL Assessment (WDL) WDL   Physical Appearance/Attire appears stated age;attire appropriate to age and situation   Hygiene well groomed   Suicidality (WDL) ex   Suicidality thoughts only   1. Wish to be Dead Yes   Wish to be Dead Description Pt states she wishes she would die   2. Non-Specific Active Suicidal Thoughts  No   Non-Specific Active Suicidal Thought Description No   3. Active Sucidal Ideation with any Methods (Not Plan) Without Intent to  Act  No   Active Suicidal Ideation with any Methods (Not Plan) Description  denies   4. Active Suicidal Ideation with Some Intent to Act, Without Specific Plan  No   Active Suicidal Ideation with Some Intent to Act, Without Specific Plan Description  denies   5. Active Suicidal Ideation with Specific Plan and Intent  No   Active Suicidal Ideation with Specific Plan and Intent Description  denies   Change in Protective Factors? No   Enviromental Risk Factors None   Self Injury other (see comment)  (denies)   Elopement (WDL) WDL   Elopement (none )   Activity (WDL) WDL   Activity other (see comment)  (appropraite)   Speech (WDL) WDL   Speech clear;coherent   Medication Sensitivity (WDL) WDL   Medication Sensitivity no stated side effects;no observed side effects   Psychomotor Gait (WDL) WDL   Psychomotor / Gait balanced;steady  (with walker)   Overt Agression (WDL) WDL   Substance Withdrawal   Substance Withdrawal None   Substance Withdrawal Interventions   Social and Therapeutic Interventions (Substance Withdrawal) encourage socialization with peers;encourage effective boundaries with peers;encourage participation in therapeutic groups and milieu activities   Sleep/Rest/Relaxation   Day/Evening Time Hours up all shift   Safety   Suicidality Status 15;Minimal furniture in room;Minimal personal belongings in room   Fall fall (yellow) wristband;clutter free environment;monitor medication effects (for fall potential)   Fall Assessment   Get up and Go Test 0 - pushes up, successful in 1 attempt   Fall Risk Interventions   High fall risk medications prescribed? yes   Suicide Risk Assessment   Are you hearing voices? No   Coping/Psychosocial   Verbalized Emotional State anxiety   Plan Of Care Reviewed With patient   Patient Agreement with Plan of Care agrees   Daily Care   Activity up ad renita   Patient Performed Hygiene other (see comments)  (per pt )   Activities of Daily Living   Hygiene/Grooming independent   Oral Hygiene  independent   Dress independent   Laundry with supervision   Room Organization independent   Activity   Activity Assistance Provided independent   Hygiene Care Assistance   Oral Care other (see comments)  (per pt )   Perineal Care other (see comments)  (per pt )   Hygiene Assistance per patient

## 2018-12-02 NOTE — PROGRESS NOTES
"   12/02/18 1411   Behavioral Health   Hallucinations denies / not responding to hallucinations   Thinking intact   Orientation person: oriented;date: oriented;place: oriented;time: oriented   Memory baseline memory   Insight poor   Judgement impaired   Eye Contact at examiner   Affect blunted, flat   Mood mood is calm   Physical Appearance/Attire attire appropriate to age and situation   Hygiene well groomed   Suicidality other (see comments)  (Pt denies)   1. Wish to be Dead Yes   2. Non-Specific Active Suicidal Thoughts  No   Self Injury other (see comment)  (pt denies)   Elopement (none)   Activity other (see comment)  (social, attending groups)   Speech clear;coherent   Medication Sensitivity no stated side effects;no observed side effects   Psychomotor / Gait balanced;steady  (steady with walker)   Activities of Daily Living   Hygiene/Grooming independent   Oral Hygiene independent   Dress independent   Laundry unable to complete   Room Organization independent   Behavioral Health Interventions   Depression maintain safety precautions;assist patient in developing safety plan;encourage nutrition and hydration;encourage participation / independence with adls;provide emotional support;establish therapeutic relationship;assist with developing and utilizing healthy coping strategies;build upon strengths   Social and Therapeutic Interventions (Depression) encourage socialization with peers;encourage effective boundaries with peers;encourage participation in therapeutic groups and milieu activities     Pt denies SI/SIB. She reports she has chronic thoughts about \"wishing I wasn't alive.\" She reports having high anxiety and depression \"they are both probably at a 10/10.\" She reports having trouble with sleep and that her chronic back pain keeps her up and prevents her from doing things herself. During check-in, pt complained about how staff isn't doing things for her, \"I didn't get my inhaler right away. I am not " "getting help with researching the places I will be going to. I have spent hours on my phone trying to figure this stuff out and I am getting no help.\" Pt was very frustrated during check-in and became tearful talking about \"everything has just built up at once and I am feeling alone and stressed.\" She has been out in the milieu and has been very social with her peers.   "

## 2018-12-03 PROCEDURE — 25000132 ZZH RX MED GY IP 250 OP 250 PS 637: Performed by: PSYCHIATRY & NEUROLOGY

## 2018-12-03 PROCEDURE — 25000132 ZZH RX MED GY IP 250 OP 250 PS 637: Performed by: PHYSICIAN ASSISTANT

## 2018-12-03 PROCEDURE — 12400007 ZZH R&B MH INTERMEDIATE UMMC

## 2018-12-03 PROCEDURE — 99232 SBSQ HOSP IP/OBS MODERATE 35: CPT | Performed by: PSYCHIATRY & NEUROLOGY

## 2018-12-03 PROCEDURE — G0177 OPPS/PHP; TRAIN & EDUC SERV: HCPCS

## 2018-12-03 PROCEDURE — 25000132 ZZH RX MED GY IP 250 OP 250 PS 637: Performed by: NURSE PRACTITIONER

## 2018-12-03 RX ADMIN — GABAPENTIN 800 MG: 800 TABLET, FILM COATED ORAL at 15:01

## 2018-12-03 RX ADMIN — TOPIRAMATE 75 MG: 50 TABLET ORAL at 21:09

## 2018-12-03 RX ADMIN — FLUTICASONE PROPIONATE 2 SPRAY: 50 SPRAY, METERED NASAL at 08:27

## 2018-12-03 RX ADMIN — GABAPENTIN 800 MG: 800 TABLET, FILM COATED ORAL at 21:08

## 2018-12-03 RX ADMIN — METOPROLOL SUCCINATE 25 MG: 25 TABLET, EXTENDED RELEASE ORAL at 08:26

## 2018-12-03 RX ADMIN — DULOXETINE HYDROCHLORIDE 40 MG: 20 CAPSULE, DELAYED RELEASE ORAL at 08:25

## 2018-12-03 RX ADMIN — HYDROXYZINE HYDROCHLORIDE 25 MG: 25 TABLET, FILM COATED ORAL at 21:19

## 2018-12-03 RX ADMIN — ALBUTEROL SULFATE 2 PUFF: 90 AEROSOL, METERED RESPIRATORY (INHALATION) at 16:27

## 2018-12-03 RX ADMIN — Medication 350 MG: at 22:37

## 2018-12-03 RX ADMIN — TOPIRAMATE 75 MG: 50 TABLET ORAL at 13:41

## 2018-12-03 RX ADMIN — GUAIFENESIN 600 MG: 600 TABLET, EXTENDED RELEASE ORAL at 08:25

## 2018-12-03 RX ADMIN — OXYCODONE HYDROCHLORIDE AND ACETAMINOPHEN 2 TABLET: 5; 325 TABLET ORAL at 22:37

## 2018-12-03 RX ADMIN — LISINOPRIL 5 MG: 5 TABLET ORAL at 08:26

## 2018-12-03 RX ADMIN — OXYCODONE HYDROCHLORIDE AND ACETAMINOPHEN 2 TABLET: 5; 325 TABLET ORAL at 16:26

## 2018-12-03 RX ADMIN — OMEPRAZOLE 20 MG: 20 CAPSULE, DELAYED RELEASE ORAL at 08:25

## 2018-12-03 RX ADMIN — ALBUTEROL SULFATE 2 PUFF: 90 AEROSOL, METERED RESPIRATORY (INHALATION) at 23:42

## 2018-12-03 RX ADMIN — TOPIRAMATE 75 MG: 50 TABLET ORAL at 08:25

## 2018-12-03 RX ADMIN — OXYCODONE HYDROCHLORIDE AND ACETAMINOPHEN 2 TABLET: 5; 325 TABLET ORAL at 06:33

## 2018-12-03 RX ADMIN — ROPINIROLE HYDROCHLORIDE 0.75 MG: 0.25 TABLET, COATED ORAL at 21:09

## 2018-12-03 RX ADMIN — MULTIPLE VITAMINS W/ MINERALS TAB 1 TABLET: TAB at 08:25

## 2018-12-03 RX ADMIN — GUAIFENESIN 600 MG: 600 TABLET, EXTENDED RELEASE ORAL at 21:09

## 2018-12-03 RX ADMIN — ALBUTEROL SULFATE 2 PUFF: 90 AEROSOL, METERED RESPIRATORY (INHALATION) at 06:33

## 2018-12-03 RX ADMIN — TRAZODONE HYDROCHLORIDE 50 MG: 50 TABLET ORAL at 21:19

## 2018-12-03 RX ADMIN — GABAPENTIN 800 MG: 800 TABLET, FILM COATED ORAL at 08:26

## 2018-12-03 RX ADMIN — PRAMIPEXOLE DIHYDROCHLORIDE 0.12 MG: 0.12 TABLET ORAL at 21:09

## 2018-12-03 RX ADMIN — Medication 350 MG: at 06:54

## 2018-12-03 RX ADMIN — Medication 350 MG: at 16:26

## 2018-12-03 ASSESSMENT — ACTIVITIES OF DAILY LIVING (ADL)
DRESS: INDEPENDENT
GROOMING: INDEPENDENT
DRESS: INDEPENDENT
GROOMING: INDEPENDENT
ORAL_HYGIENE: INDEPENDENT
ORAL_HYGIENE: INDEPENDENT
LAUNDRY: UNABLE TO COMPLETE

## 2018-12-03 NOTE — PROGRESS NOTES
"Pt approached nurse and asked to use phone. This staff stayed with patient while phone was in use. Pt stated it would take ten minutes to get reppayee number and ensure that money has been transferred to her bank. Pt became frustrated with staff after thirty minutes together when staff asked patient to return phone. Pt stated that earlier in the shift \"they answered the phone and my  called. They told him I was in group and I have not talked to him in four years.\" Pt nurse had answered the phone and per unit policy asked him to call back later relaying the message to the patient after group was over. Pt continued to state that she \"just have one more thing\" left to do on phone and nurse was asked over to obtain phone from patient. Pt was seen looking at hotels online which is why she was asked to give phone back.  "

## 2018-12-03 NOTE — PROGRESS NOTES
Patient complained of lower back pain and breathing difficulty. Requested for Percocet and Albuterol. Percocet 2 tabs and Albuterol inhaler given @ 0633 PRN for pain and dyspnea. Slept the whole shift.    Patient verbalized full relief from pain. Soma tabs were requested and given  @ 0654 PRN for muscle spasms.

## 2018-12-03 NOTE — PROGRESS NOTES
Patient irritated at the day staff for not taking care of her needs and feeling as though others felt the same way. Very redirectable after an attentive conversation about it. Patient has no active SI or SIB thoughts. Social on the.

## 2018-12-03 NOTE — PROGRESS NOTES
Pt stated she feels frustrated due to limitations on using her phone to conduct business. Pt stated she feels depressed (10/10) and very anxious. Pt stated she has thoughts of not wanting to be alive but denied thoughts of SI. Pt active and social in milieu. Pt denied SIB and hallucinations.        12/03/18 1500   Behavioral Health   Hallucinations denies / not responding to hallucinations   Thinking distractable   Orientation person: oriented;place: oriented;date: oriented;time: oriented   Memory baseline memory   Insight poor   Judgement impaired   Affect full range affect;sad   Mood mood is calm;anxious;depressed   Physical Appearance/Attire attire appropriate to age and situation   Hygiene well groomed   Suicidality other (see comments)  (Denies)   1. Wish to be Dead Yes   2. Non-Specific Active Suicidal Thoughts  No   Self Injury other (see comment)  (Denies)   Elopement (None)   Activity other (see comment)  (Active in milieu)   Speech clear;coherent   Medication Sensitivity no stated side effects;no observed side effects   Psychomotor / Gait balanced;steady   Safety   Suicidality Status 15   Psycho Education   Type of Intervention 1:1 intervention   Response participates, initiates socially appropriate   Hours 0.5   Treatment Detail Check-in   Activities of Daily Living   Hygiene/Grooming independent   Oral Hygiene independent   Dress independent   Room Organization independent   Activity   Activity Assistance Provided independent   Behavioral Health Interventions   Depression maintain safety precautions;maintain safe secure environment;assist patient in developing safety plan;assist patient in following safety plan;provide emotional support;establish therapeutic relationship;assist with developing and utilizing healthy coping strategies;assess patient response to medication;build upon strengths;monitor need for prn medication   Social and Therapeutic Interventions (Depression) encourage participation in  therapeutic groups and milieu activities

## 2018-12-03 NOTE — PROGRESS NOTES
Left voice message at [facility] to determine availability.    Contacted staff at [facility] to determine availability.  None at this time.    Left voice message at [facility].  Sent faxed notes to [phone].

## 2018-12-03 NOTE — PROGRESS NOTES
"  [facility], [place]   Psychiatric Progress Note        Interim History:     The patient's care was discussed with the treatment team during the daily team meeting and/or staff's chart notes were reviewed.  Staff report patient is calm and cooperative. Slept well. Looks good in groups.     The patient reports that she had an \"altercation with staff.\" Says that she wasn't allowed to use her phone to get information to pay a bill. Reports that she had a good weekend. Was able to get her money on the 3rd (today). Says she is on the wait list for [facility]. Sleeping \"on and off.\" Denies SI or HI. Agreeable to discharge tomorrow if no bed can be found today.     Discussed medications and care plan.        Medications:       DULoxetine  40 mg Oral Daily     fluticasone  2 spray Both Nostrils Daily     gabapentin  800 mg Oral TID     guaiFENesin  600 mg Oral BID     lisinopril  5 mg Oral Daily     metoprolol succinate ER  25 mg Oral Daily     multivitamin w/minerals  1 tablet Oral Daily     omeprazole  20 mg Oral Daily     pramipexole  0.125 mg Oral At Bedtime     rOPINIRole  0.75 mg Oral At Bedtime     topiramate  75 mg Oral TID          Allergies:   No Known Allergies       Labs:     No results found for this or any previous visit (from the past 24 hour(s)).       Psychiatric Examination:     Vitals:    12/01/18 1951 12/02/18 0835 12/02/18 2008 12/03/18 0803   BP:  184/73 139/70 127/59   Pulse:  87 80    Resp:  18 16 16   Temp: 97.7  F (36.5  C) 98.1  F (36.7  C) 97.9  F (36.6  C) 97.9  F (36.6  C)   TempSrc:  Oral Tympanic Tympanic   SpO2: 97% 100% 100% 98%   Weight:  80.2 kg (176 lb 13.1 oz)     Height:                         Sitting Orthostatic BP: 116/56      Sitting Orthostatic Pulse: 74 bpm      Standing Orthostatic BP: 94/55      Standing Orthostatic Pulse: 81 bpm       Weight is 176 lbs 13.06 oz  Body mass index is 32.34 kg/(m^2).    Appearance: awake, alert, appeared older " than stated age, well groomed and no apparent distress  Attitude:  cooperative  Eye Contact:  good  Mood:  better  Affect:  appropriate and in normal range  Speech:  clear, coherent and normal prosody  Psychomotor Behavior:  no evidence of tardive dyskinesia, dystonia, or tics and intact station, gait and muscle tone  Throught Process:  linear and goal oriented  Associations:  no loose associations  Thought Content:  no evidence of suicidal ideation or homicidal ideation and no evidence of psychotic thought  Insight:  fair  Judgement:  intact  Oriented to:  time, person, and place  Attention Span and Concentration:  intact  Recent and Remote Memory:  intact         Precautions:     Behavioral Orders   Procedures     Code 1 - Restrict to Unit     Fall precautions     Routine Programming     As clinically indicated     Status 15     Every 15 minutes.     Suicide precautions     Patients on Suicide Precautions should have a Combination Diet ordered that includes a Diet selection(s) AND a Behavioral Tray selection for Safe Tray - with utensils, or Safe Tray - NO utensils            Diagnoses:     Major depressive disorder, recurrent, moderate.         Plan:     Medications:  -- Celexa was discontinued on admission.   -- Cymbalta: started and titrated to 40 mg daily. Plan to increase to 60 mg on 12/05.   -- Gabapentin: continued at 800 mg TID.   -- Topamax: continued at 75 mg TID.   -- Requip: continued at 0.75 mg qhs. No refills will be issued on discharge. Patient has 1 month supply  -- Percocet: continued at 1-2 tab qid prn for severe pain but was advised to limit use.  No refills or prescription will be issued on discharge. Patient started that she has refill at pharmacy.     Legal Status and Disposition:  -- volunt.   -- discharge will be granted once established mood stabilization, remission of SI and safety in the community. Refer to [facility] and other Crisis Units in the community for further stabilization.  Discharge tomorrow.

## 2018-12-03 NOTE — PLAN OF CARE
Problem: Occupational Therapy Goals (Adult)  Goal: Occupational Therapy Goals  Will consistently attend OT groups and improve coping strategies with increasing repertoire of ideas and understanding of symptoms of when to use the strategies.   Attended 2 of 2 OT groups. She worked on complex problem solving steps activity on the IPAD with success. She talked about finding a place to move to which she stated being pleased about what they had to offer. She also participated in an activity focused on identifying an idea of what a successful day would like like and helpful coping ideas. Resiliency was discussed and what that meant to each person along with helpful advice received. Her comments were in context and insightful. She stated plans for discharge tomorrow and stated she was working on plans. Affect brightened with interactions.

## 2018-12-04 LAB
ALBUMIN UR-MCNC: NEGATIVE MG/DL
APPEARANCE UR: CLEAR
BACTERIA #/AREA URNS HPF: ABNORMAL /HPF
BILIRUB UR QL STRIP: NEGATIVE
COLOR UR AUTO: YELLOW
GLUCOSE UR STRIP-MCNC: NEGATIVE MG/DL
HGB UR QL STRIP: NEGATIVE
KETONES UR STRIP-MCNC: NEGATIVE MG/DL
LEUKOCYTE ESTERASE UR QL STRIP: ABNORMAL
NITRATE UR QL: NEGATIVE
PH UR STRIP: 7 PH (ref 5–7)
RBC #/AREA URNS AUTO: <1 /HPF (ref 0–2)
SOURCE: ABNORMAL
SP GR UR STRIP: 1.02 (ref 1–1.03)
SQUAMOUS #/AREA URNS AUTO: <1 /HPF (ref 0–1)
UROBILINOGEN UR STRIP-MCNC: NORMAL MG/DL (ref 0–2)
WBC #/AREA URNS AUTO: 16 /HPF (ref 0–5)

## 2018-12-04 PROCEDURE — 25000132 ZZH RX MED GY IP 250 OP 250 PS 637: Performed by: NURSE PRACTITIONER

## 2018-12-04 PROCEDURE — 25000132 ZZH RX MED GY IP 250 OP 250 PS 637: Performed by: PHYSICIAN ASSISTANT

## 2018-12-04 PROCEDURE — 12400007 ZZH R&B MH INTERMEDIATE UMMC

## 2018-12-04 PROCEDURE — G0177 OPPS/PHP; TRAIN & EDUC SERV: HCPCS

## 2018-12-04 PROCEDURE — H2032 ACTIVITY THERAPY, PER 15 MIN: HCPCS

## 2018-12-04 PROCEDURE — 81001 URINALYSIS AUTO W/SCOPE: CPT | Performed by: PHYSICIAN ASSISTANT

## 2018-12-04 PROCEDURE — 87086 URINE CULTURE/COLONY COUNT: CPT | Performed by: PSYCHIATRY & NEUROLOGY

## 2018-12-04 PROCEDURE — 25000132 ZZH RX MED GY IP 250 OP 250 PS 637: Performed by: PSYCHIATRY & NEUROLOGY

## 2018-12-04 PROCEDURE — 99232 SBSQ HOSP IP/OBS MODERATE 35: CPT | Performed by: PSYCHIATRY & NEUROLOGY

## 2018-12-04 PROCEDURE — 99231 SBSQ HOSP IP/OBS SF/LOW 25: CPT | Performed by: PHYSICIAN ASSISTANT

## 2018-12-04 RX ORDER — DULOXETIN HYDROCHLORIDE 60 MG/1
60 CAPSULE, DELAYED RELEASE ORAL DAILY
Qty: 30 CAPSULE | Refills: 1 | Status: SHIPPED | OUTPATIENT
Start: 2018-12-05 | End: 2019-01-11

## 2018-12-04 RX ORDER — ALBUTEROL SULFATE 90 UG/1
2 AEROSOL, METERED RESPIRATORY (INHALATION) 4 TIMES DAILY PRN
Qty: 1 INHALER | Refills: 1 | Status: SHIPPED | OUTPATIENT
Start: 2018-12-04

## 2018-12-04 RX ADMIN — OMEPRAZOLE 20 MG: 20 CAPSULE, DELAYED RELEASE ORAL at 07:58

## 2018-12-04 RX ADMIN — OXYCODONE HYDROCHLORIDE AND ACETAMINOPHEN 2 TABLET: 5; 325 TABLET ORAL at 06:45

## 2018-12-04 RX ADMIN — MULTIPLE VITAMINS W/ MINERALS TAB 1 TABLET: TAB at 07:57

## 2018-12-04 RX ADMIN — TRAZODONE HYDROCHLORIDE 50 MG: 50 TABLET ORAL at 22:13

## 2018-12-04 RX ADMIN — GABAPENTIN 800 MG: 800 TABLET, FILM COATED ORAL at 20:25

## 2018-12-04 RX ADMIN — LISINOPRIL 5 MG: 5 TABLET ORAL at 07:57

## 2018-12-04 RX ADMIN — TOPIRAMATE 75 MG: 50 TABLET ORAL at 20:23

## 2018-12-04 RX ADMIN — OXYCODONE HYDROCHLORIDE AND ACETAMINOPHEN 2 TABLET: 5; 325 TABLET ORAL at 20:24

## 2018-12-04 RX ADMIN — TOPIRAMATE 75 MG: 50 TABLET ORAL at 13:05

## 2018-12-04 RX ADMIN — FLUTICASONE PROPIONATE 2 SPRAY: 50 SPRAY, METERED NASAL at 07:58

## 2018-12-04 RX ADMIN — Medication 350 MG: at 20:23

## 2018-12-04 RX ADMIN — PRAMIPEXOLE DIHYDROCHLORIDE 0.12 MG: 0.12 TABLET ORAL at 20:29

## 2018-12-04 RX ADMIN — GABAPENTIN 800 MG: 800 TABLET, FILM COATED ORAL at 13:05

## 2018-12-04 RX ADMIN — GABAPENTIN 800 MG: 800 TABLET, FILM COATED ORAL at 07:58

## 2018-12-04 RX ADMIN — METOPROLOL SUCCINATE 25 MG: 25 TABLET, EXTENDED RELEASE ORAL at 07:58

## 2018-12-04 RX ADMIN — GUAIFENESIN 600 MG: 600 TABLET, EXTENDED RELEASE ORAL at 20:23

## 2018-12-04 RX ADMIN — GUAIFENESIN 600 MG: 600 TABLET, EXTENDED RELEASE ORAL at 07:57

## 2018-12-04 RX ADMIN — DULOXETINE HYDROCHLORIDE 40 MG: 20 CAPSULE, DELAYED RELEASE ORAL at 07:58

## 2018-12-04 RX ADMIN — ALBUTEROL SULFATE 2 PUFF: 90 AEROSOL, METERED RESPIRATORY (INHALATION) at 06:40

## 2018-12-04 RX ADMIN — ALBUTEROL SULFATE 2 PUFF: 90 AEROSOL, METERED RESPIRATORY (INHALATION) at 22:14

## 2018-12-04 RX ADMIN — OXYCODONE HYDROCHLORIDE AND ACETAMINOPHEN 2 TABLET: 5; 325 TABLET ORAL at 14:39

## 2018-12-04 RX ADMIN — Medication 350 MG: at 06:45

## 2018-12-04 RX ADMIN — HYDROXYZINE HYDROCHLORIDE 25 MG: 25 TABLET, FILM COATED ORAL at 22:13

## 2018-12-04 RX ADMIN — ROPINIROLE HYDROCHLORIDE 0.75 MG: 0.25 TABLET, COATED ORAL at 20:29

## 2018-12-04 RX ADMIN — Medication 350 MG: at 14:42

## 2018-12-04 RX ADMIN — TOPIRAMATE 75 MG: 50 TABLET ORAL at 07:58

## 2018-12-04 ASSESSMENT — ACTIVITIES OF DAILY LIVING (ADL)
ORAL_HYGIENE: INDEPENDENT
ORAL_HYGIENE: INDEPENDENT
DRESS: INDEPENDENT
DRESS: INDEPENDENT
LAUNDRY: WITH SUPERVISION
GROOMING: INDEPENDENT
GROOMING: INDEPENDENT

## 2018-12-04 NOTE — PLAN OF CARE
"Problem: Depressive Symptoms  Goal: Depressive Symptoms  Signs and symptoms of listed problems will be absent or manageable.   1. Pt will report that her feelings of depression have decreased to a manageable level.  2. Pt will contribute to discharge planning process.   3. Pt will participate in 75% of unit groups and activities.   4. Pt will report pain is at a tolerable level.    Outcome: No Change  PRN soma 350 mg given per request for muscle spasms x 2 this shift with relief reported. 2 tabs percocet given per request for pain x 2 this shift with relief reported. PRN albuterol given per request for dyspnea with relief reproted. Pt states she is ready for discharge tomorrow. Her plan is to stay at a hotel. Pt denies SI/SIB, pt states she always wishes she were dead. Pt reports she has urinary frequency/urgency, pt denies burning and reports has has \"told the doctor\", pt reports this is frustrating to her. Pt social with peers and staff.       "

## 2018-12-04 NOTE — PROGRESS NOTES
Attended 1 of 2 OT groups prior to discharge. She was social, pleasant, stated feeling positive and worked successfully on a task that was more challenging using problem solving. Conversation was positive. She was supportive of a peer.

## 2018-12-04 NOTE — PLAN OF CARE
"Problem: Depressive Symptoms  Goal: Depressive Symptoms  Signs and symptoms of listed problems will be absent or manageable.   1. Pt will report that her feelings of depression have decreased to a manageable level.  2. Pt will contribute to discharge planning process.   3. Pt will participate in 75% of unit groups and activities.   4. Pt will report pain is at a tolerable level.     Pt is endorsing suicidal thoughts minutes before discharge. She states \"I will kill my self if I don't get medication for my incontinence\". She is complaining about frequent urination and incontinence which she reports has been going on for months. Pt wants to be seen for her incontinence before she discharges. Dr Melendez was notified, discharge cancelled and internal medicine Dary was notified about pt's concerns. Pt is isra for safety on the unit.       "

## 2018-12-04 NOTE — PROGRESS NOTES
Patient complained of difficulty in breathing twice during the shift. Albuterol 2 puffs given @ 2342 and 0640. Patient also complained of constant  low back  pain and muscle spasms. Percocet 2 tabs and Soma 350 mg. given @0645 PRN for back pain and muscle spasms.

## 2018-12-04 NOTE — PLAN OF CARE
Problem: Depressive Symptoms  Goal: Depressive Symptoms  Signs and symptoms of listed problems will be absent or manageable.   1. Pt will report that her feelings of depression have decreased to a manageable level.  2. Pt will contribute to discharge planning process.   3. Pt will participate in 75% of unit groups and activities.   4. Pt will report pain is at a tolerable level.    Pt denies suicidal ideation or homicidal ideation. Has received all belongings. Discharge medications and follow up instructions reviewed with patient. Patient verbalizes understanding of discharge instructions.

## 2018-12-05 LAB
BACTERIA SPEC CULT: NORMAL
Lab: NORMAL
SPECIMEN SOURCE: NORMAL

## 2018-12-05 PROCEDURE — 25000132 ZZH RX MED GY IP 250 OP 250 PS 637: Performed by: PSYCHIATRY & NEUROLOGY

## 2018-12-05 PROCEDURE — G0177 OPPS/PHP; TRAIN & EDUC SERV: HCPCS

## 2018-12-05 PROCEDURE — 12400007 ZZH R&B MH INTERMEDIATE UMMC

## 2018-12-05 PROCEDURE — 25000132 ZZH RX MED GY IP 250 OP 250 PS 637: Performed by: PHYSICIAN ASSISTANT

## 2018-12-05 PROCEDURE — 99232 SBSQ HOSP IP/OBS MODERATE 35: CPT | Performed by: PSYCHIATRY & NEUROLOGY

## 2018-12-05 PROCEDURE — 25000132 ZZH RX MED GY IP 250 OP 250 PS 637: Performed by: NURSE PRACTITIONER

## 2018-12-05 RX ORDER — CARISOPRODOL 250 MG/1
250 TABLET ORAL 4 TIMES DAILY PRN
Qty: 16 TABLET | Refills: 0 | Status: SHIPPED | OUTPATIENT
Start: 2018-12-05 | End: 2019-05-09

## 2018-12-05 RX ADMIN — GUAIFENESIN 600 MG: 600 TABLET, EXTENDED RELEASE ORAL at 08:49

## 2018-12-05 RX ADMIN — FLUTICASONE PROPIONATE 2 SPRAY: 50 SPRAY, METERED NASAL at 08:47

## 2018-12-05 RX ADMIN — GABAPENTIN 800 MG: 800 TABLET, FILM COATED ORAL at 13:58

## 2018-12-05 RX ADMIN — TOPIRAMATE 75 MG: 50 TABLET ORAL at 22:16

## 2018-12-05 RX ADMIN — ROPINIROLE HYDROCHLORIDE 0.75 MG: 0.25 TABLET, COATED ORAL at 22:17

## 2018-12-05 RX ADMIN — GABAPENTIN 800 MG: 800 TABLET, FILM COATED ORAL at 08:48

## 2018-12-05 RX ADMIN — PRAMIPEXOLE DIHYDROCHLORIDE 0.12 MG: 0.12 TABLET ORAL at 22:17

## 2018-12-05 RX ADMIN — TOPIRAMATE 75 MG: 50 TABLET ORAL at 13:58

## 2018-12-05 RX ADMIN — GABAPENTIN 800 MG: 800 TABLET, FILM COATED ORAL at 22:18

## 2018-12-05 RX ADMIN — OMEPRAZOLE 20 MG: 20 CAPSULE, DELAYED RELEASE ORAL at 08:49

## 2018-12-05 RX ADMIN — OXYCODONE HYDROCHLORIDE AND ACETAMINOPHEN 2 TABLET: 5; 325 TABLET ORAL at 19:31

## 2018-12-05 RX ADMIN — Medication 350 MG: at 19:31

## 2018-12-05 RX ADMIN — GUAIFENESIN 600 MG: 600 TABLET, EXTENDED RELEASE ORAL at 22:16

## 2018-12-05 RX ADMIN — METOPROLOL SUCCINATE 25 MG: 25 TABLET, EXTENDED RELEASE ORAL at 08:48

## 2018-12-05 RX ADMIN — ALBUTEROL SULFATE 2 PUFF: 90 AEROSOL, METERED RESPIRATORY (INHALATION) at 17:25

## 2018-12-05 RX ADMIN — DULOXETINE HYDROCHLORIDE 40 MG: 20 CAPSULE, DELAYED RELEASE ORAL at 08:48

## 2018-12-05 RX ADMIN — MULTIPLE VITAMINS W/ MINERALS TAB 1 TABLET: TAB at 08:49

## 2018-12-05 RX ADMIN — OXYCODONE HYDROCHLORIDE AND ACETAMINOPHEN 2 TABLET: 5; 325 TABLET ORAL at 08:48

## 2018-12-05 RX ADMIN — TOPIRAMATE 75 MG: 50 TABLET ORAL at 08:48

## 2018-12-05 RX ADMIN — HYDROXYZINE HYDROCHLORIDE 25 MG: 25 TABLET, FILM COATED ORAL at 22:20

## 2018-12-05 RX ADMIN — TRAZODONE HYDROCHLORIDE 50 MG: 50 TABLET ORAL at 22:20

## 2018-12-05 RX ADMIN — LISINOPRIL 5 MG: 5 TABLET ORAL at 08:48

## 2018-12-05 RX ADMIN — Medication 350 MG: at 08:48

## 2018-12-05 ASSESSMENT — ACTIVITIES OF DAILY LIVING (ADL)
GROOMING: INDEPENDENT
ORAL_HYGIENE: INDEPENDENT
DRESS: INDEPENDENT

## 2018-12-05 NOTE — PROGRESS NOTES
[facility], [place]   Psychiatric Progress Note        Interim History:     The patient's care was discussed with the treatment team during the daily team meeting and/or staff's chart notes were reviewed.  Staff report patient has been making phone calls.     The patient reports that she was told her appointment was for 2pm yesterday but it was for Monday. Did not feel safe discharging without having her urinary symptoms worked up. Discussed that she would discharge once the urine culture was back. Patient agreeable. Does endorse SI but feels safe here. Asks for a few days of Soma until she can meet with her PCP on the 10th.     Discussed medications and care plan.        Medications:       DULoxetine  40 mg Oral Daily     fluticasone  2 spray Both Nostrils Daily     gabapentin  800 mg Oral TID     guaiFENesin  600 mg Oral BID     lisinopril  5 mg Oral Daily     metoprolol succinate ER  25 mg Oral Daily     multivitamin w/minerals  1 tablet Oral Daily     omeprazole  20 mg Oral Daily     pramipexole  0.125 mg Oral At Bedtime     rOPINIRole  0.75 mg Oral At Bedtime     topiramate  75 mg Oral TID          Allergies:   No Known Allergies       Labs:     Recent Results (from the past 24 hour(s))   UA with Microscopic reflex to Culture    Collection Time: 12/04/18  6:45 PM   Result Value Ref Range    Color Urine Yellow     Appearance Urine Clear     Glucose Urine Negative NEG^Negative mg/dL    Bilirubin Urine Negative NEG^Negative    Ketones Urine Negative NEG^Negative mg/dL    Specific Gravity Urine 1.016 1.003 - 1.035    Blood Urine Negative NEG^Negative    pH Urine 7.0 5.0 - 7.0 pH    Protein Albumin Urine Negative NEG^Negative mg/dL    Urobilinogen mg/dL Normal 0.0 - 2.0 mg/dL    Nitrite Urine Negative NEG^Negative    Leukocyte Esterase Urine Large (A) NEG^Negative    Source Unspecified Urine     WBC Urine 16 (H) 0 - 5 /HPF    RBC Urine <1 0 - 2 /HPF    Bacteria Urine Few (A)  NEG^Negative /HPF    Squamous Epithelial /HPF Urine <1 0 - 1 /HPF   Urine Culture Aerobic Bacterial    Collection Time: 12/04/18  6:45 PM   Result Value Ref Range    Specimen Description Unspecified Urine     Special Requests Specimen received in preservative     Culture Micro Culture in progress           Psychiatric Examination:     Vitals:    12/03/18 0803 12/03/18 1600 12/04/18 0800 12/05/18 0830   BP: 127/59 150/81 152/74 163/75   BP Location:   Left arm    Pulse:   86 99   Resp: 16 16 16 16   Temp: 97.9  F (36.6  C) 96.4  F (35.8  C) 96.8  F (36  C) 99  F (37.2  C)   TempSrc: Tympanic Tympanic Tympanic Tympanic   SpO2: 98% 100% 99% 97%   Weight:   81.6 kg (179 lb 14.4 oz)    Height:                         Sitting Orthostatic BP: 116/56      Sitting Orthostatic Pulse: 74 bpm      Standing Orthostatic BP: 94/55      Standing Orthostatic Pulse: 81 bpm       Weight is 179 lbs 14.4 oz  Body mass index is 32.9 kg/(m^2).    Appearance: awake, alert, appeared older than stated age, well groomed and no apparent distress  Attitude:  cooperative  Eye Contact:  good  Mood:  good  Affect:  appropriate and in normal range  Speech:  clear, coherent and normal prosody  Psychomotor Behavior:  no evidence of tardive dyskinesia, dystonia, or tics and intact station, gait and muscle tone  Throught Process:  linear and goal oriented  Associations:  no loose associations  Thought Content:  no evidence of psychotic thought and passive suicidal ideation present  Insight:  fair  Judgement:  intact  Oriented to:  time, person, and place  Attention Span and Concentration:  intact  Recent and Remote Memory:  intact         Precautions:     Behavioral Orders   Procedures     Code 1 - Restrict to Unit     Fall precautions     Routine Programming     As clinically indicated     Status 15     Every 15 minutes.     Suicide precautions     Patients on Suicide Precautions should have a Combination Diet ordered that includes a Diet selection(s)  AND a Behavioral Tray selection for Safe Tray - with utensils, or Safe Tray - NO utensils            Diagnoses:     Major depressive disorder, recurrent, moderate.         Plan:     Medications:  -- Celexa was discontinued on admission.   -- Cymbalta: started and titrated to 40 mg daily. Plan to increase to 60 mg on 12/05.   -- Gabapentin: continued at 800 mg TID.   -- Topamax: continued at 75 mg TID.   -- Requip: continued at 0.75 mg qhs. No refills will be issued on discharge. Patient has 1 month supply  -- Percocet: continued at 1-2 tab qid prn for severe pain but was advised to limit use.  No refills or prescription will be issued on discharge. Patient started that she has refill at pharmacy.     Legal Status and Disposition:  -- volunt.   -- discharge will be granted once established mood stabilization, remission of SI and safety in the community.   -- awaiting results of urine culture prior to discharge.

## 2018-12-05 NOTE — PROGRESS NOTES
"Brief Medicine Note    Contacted by nursing regarding urinary incontinence.     Patient reports having urinary incontinence for a few months, associated with a sudden onset of urgency where she will have to hurry to use the bathroom, followed by dribbling and incontinence. She denies any hematuria, flank pain, dysuria or evidence of incontinence in the past. She reports laughing and coughing make it worse.  She does note drinking coffee in the morning but no other caffeine exposure. She has had 2 vaginal births in the past.     She does have a hx of chronic low back pain requiring multiple surgeries. She denies any saddle anesthesia, new onset radiculopathy, but does note intermittent incontinence of stools.      Today's vital signs, medications, and nursing notes were reviewed.     /74 (BP Location: Left arm)  Pulse 86  Temp 96.8  F (36  C) (Tympanic)  Resp 16  Ht 1.575 m (5' 2\")  Wt 81.6 kg (179 lb 14.4 oz)  SpO2 99%  BMI 32.9 kg/m2  General: A&O. NAD.   GENERAL: Alert and oriented x 3. Appears comfortable. Answering questions appropriately.   HEENT: Anicteric sclera. Mucous membranes moist.   CV: RRR. S1, S2. No murmurs appreciated.   RESPIRATORY: Effort normal on room air. Lungs CTAB with no wheezing, rales, rhonchi.   GI: Abdomen soft and non distended, bowel sounds present. No tenderness, rebound, guarding. Large healed surgical scar to RUQ.   MUSCULOSKELETAL: No joint swelling or tenderness. There is a large midline well healed surgical scar in lumbar spine. She is ambulatory. She has 5/5 strength in BLE, sensation intact. Distal pulses intact. Rectal sphincter tone and rectal sensation intact.   NEUROLOGICAL: No focal deficits. Moves all extremities.   EXTREMITIES: No peripheral edema. Intact bilateral pedal pulses.   SKIN: No jaundice. No rashes.     A/P:  Urinary incontinence, unspecified:   Differential includes urge incontinence, overactive bladder, stress  incontinence, overflow vs " incontinence from acute cauda equina. Bladder scan negative for acute urinary retention/overflow incontinence. Given her hx of a few months worth of urinary incontinence and history, most likely 2/2 urge incontinence, overactive bladder, stress incontinence. She has intact rectal tone on examination, no saddle asthenia, with normal gait, strength, sensation and distal pulses intact in BLE. No acute injury, therefore cauda equina unlikely. She also planning to be discharged today per psychiatry, but wanted this to be evaluated before discharge. May be a component of wanting to stay in hospital.   -Obtain UA to assess   -Bladder scan negative for urinary retention  -Kegel exercises were discussed  -Avoid triggers including caffeine, oral intake 2 hours prior to sleeping  -She should follow-up with PCP or Ob/Gyn within 7 days for further work-up  -May benefit from medication such as oxybutynin in the future and referral to pelvic floor PT     Will follow-up on urinalysis. If unremarkable, No further medical intervention is required at this time and medicine will sign off. Please feel free to call with any questions.     Dary Carter PA-C  Hospitalist Service  Pager [phone]

## 2018-12-05 NOTE — PROGRESS NOTES
"  [facility], [place]   Psychiatric Progress Note        Interim History:     The patient's care was discussed with the treatment team during the daily team meeting and/or staff's chart notes were reviewed.  Staff report patient has a 2pm appointment with her PCP.     The patient reports that she is doing \"fine.\" Says that she is a little upset that her  called and she wasn't told of this. Denies problems with mood. Says that she did get up 4 times last night to use the bathroom. Denies SI or HI. Plans to discharge today.     Discussed medications and care plan.        Medications:       DULoxetine  40 mg Oral Daily     fluticasone  2 spray Both Nostrils Daily     gabapentin  800 mg Oral TID     guaiFENesin  600 mg Oral BID     lisinopril  5 mg Oral Daily     metoprolol succinate ER  25 mg Oral Daily     multivitamin w/minerals  1 tablet Oral Daily     omeprazole  20 mg Oral Daily     pramipexole  0.125 mg Oral At Bedtime     rOPINIRole  0.75 mg Oral At Bedtime     topiramate  75 mg Oral TID          Allergies:   No Known Allergies       Labs:     Recent Results (from the past 24 hour(s))   UA with Microscopic reflex to Culture    Collection Time: 12/04/18  6:45 PM   Result Value Ref Range    Color Urine Yellow     Appearance Urine Clear     Glucose Urine Negative NEG^Negative mg/dL    Bilirubin Urine Negative NEG^Negative    Ketones Urine Negative NEG^Negative mg/dL    Specific Gravity Urine 1.016 1.003 - 1.035    Blood Urine Negative NEG^Negative    pH Urine 7.0 5.0 - 7.0 pH    Protein Albumin Urine Negative NEG^Negative mg/dL    Urobilinogen mg/dL Normal 0.0 - 2.0 mg/dL    Nitrite Urine Negative NEG^Negative    Leukocyte Esterase Urine Large (A) NEG^Negative    Source Unspecified Urine     WBC Urine 16 (H) 0 - 5 /HPF    RBC Urine <1 0 - 2 /HPF    Bacteria Urine Few (A) NEG^Negative /HPF    Squamous Epithelial /HPF Urine <1 0 - 1 /HPF   Urine Culture Aerobic Bacterial    " Collection Time: 12/04/18  6:45 PM   Result Value Ref Range    Specimen Description Unspecified Urine     Special Requests Specimen received in preservative     Culture Micro PENDING           Psychiatric Examination:     Vitals:    12/02/18 2008 12/03/18 0803 12/03/18 1600 12/04/18 0800   BP: 139/70 127/59 150/81 152/74   BP Location:    Left arm   Pulse: 80   86   Resp: 16 16 16 16   Temp: 97.9  F (36.6  C) 97.9  F (36.6  C) 96.4  F (35.8  C) 96.8  F (36  C)   TempSrc: Tympanic Tympanic Tympanic Tympanic   SpO2: 100% 98% 100% 99%   Weight:    81.6 kg (179 lb 14.4 oz)   Height:                         Sitting Orthostatic BP: 116/56      Sitting Orthostatic Pulse: 74 bpm      Standing Orthostatic BP: 94/55      Standing Orthostatic Pulse: 81 bpm       Weight is 179 lbs 14.4 oz  Body mass index is 32.9 kg/(m^2).    Appearance: awake, alert, appeared older than stated age, well groomed and no apparent distress  Attitude:  cooperative  Eye Contact:  good  Mood:  good  Affect:  appropriate and in normal range  Speech:  clear, coherent and normal prosody  Psychomotor Behavior:  no evidence of tardive dyskinesia, dystonia, or tics and intact station, gait and muscle tone  Throught Process:  linear and goal oriented  Associations:  no loose associations  Thought Content:  no evidence of suicidal ideation or homicidal ideation and no evidence of psychotic thought  Insight:  fair  Judgement:  intact  Oriented to:  time, person, and place  Attention Span and Concentration:  intact  Recent and Remote Memory:  intact         Precautions:     Behavioral Orders   Procedures     Code 1 - Restrict to Unit     Fall precautions     Routine Programming     As clinically indicated     Status 15     Every 15 minutes.     Suicide precautions     Patients on Suicide Precautions should have a Combination Diet ordered that includes a Diet selection(s) AND a Behavioral Tray selection for Safe Tray - with utensils, or Safe Tray - NO  utensils            Diagnoses:     Major depressive disorder, recurrent, moderate.         Plan:     Medications:  -- Celexa was discontinued on admission.   -- Cymbalta: started and titrated to 40 mg daily. Plan to increase to 60 mg on 12/05.   -- Gabapentin: continued at 800 mg TID.   -- Topamax: continued at 75 mg TID.   -- Requip: continued at 0.75 mg qhs. No refills will be issued on discharge. Patient has 1 month supply  -- Percocet: continued at 1-2 tab qid prn for severe pain but was advised to limit use.  No refills or prescription will be issued on discharge. Patient started that she has refill at pharmacy.     Legal Status and Disposition:  -- volunt.   -- discharge will be granted once established mood stabilization, remission of SI and safety in the community. Refer to Vickie Rm and other Crisis Units in the community for further stabilization.   -- patient was to discharge today but when she found out she had missed her PCP appointment, she voiced suicidal ideation due to her urinary incontinence. Her discharge will be canceled.

## 2018-12-05 NOTE — PLAN OF CARE
Problem: General Plan of Care (Inpatient Behavioral)  Goal: Discharge Planning  Outcome: Therapy, progress toward functional goals is gradual  Pt has been working on discontinue planning/phone calls.

## 2018-12-05 NOTE — PROGRESS NOTES
Patient states that her bladder always feels distended.  Post void bladder scan was 66cc.  UA/UC sent to the lab      Patient states she has SI with no plan and has had a wish to die for several months. Patient isolates to her room at times but is usually seen in the lounge and has been social with select patients. Rates depression and anxiety at 10/10 but appears relaxed and content in the lounge.  P:  Continue to monitor.

## 2018-12-05 NOTE — PROGRESS NOTES
Pt reports she feels suicidal. Goes on to state she has no plan to do this. Pt has been up and about. Has attended groups. Has been present at meals and is eating well. Pt is social with peers.

## 2018-12-05 NOTE — PLAN OF CARE
"Problem: Occupational Therapy Goals (Adult)  Goal: Occupational Therapy Goals  Will consistently attend OT groups and improve coping strategies with increasing repertoire of ideas and understanding of symptoms of when to use the strategies.      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 Her work was organized and planning was detail oriented on task. She assisted with teaching procedures of directions of a more complex activity on an IPAD to a peer. Affect was bright with interactions.      "

## 2018-12-06 VITALS
TEMPERATURE: 96.7 F | HEART RATE: 99 BPM | BODY MASS INDEX: 33.16 KG/M2 | HEIGHT: 62 IN | DIASTOLIC BLOOD PRESSURE: 58 MMHG | RESPIRATION RATE: 16 BRPM | SYSTOLIC BLOOD PRESSURE: 131 MMHG | WEIGHT: 180.2 LBS | OXYGEN SATURATION: 100 %

## 2018-12-06 PROCEDURE — 25000132 ZZH RX MED GY IP 250 OP 250 PS 637: Performed by: NURSE PRACTITIONER

## 2018-12-06 PROCEDURE — G0177 OPPS/PHP; TRAIN & EDUC SERV: HCPCS

## 2018-12-06 PROCEDURE — 99207 ZZC NO CHARGE VISIT/PATIENT NOT SEEN: CPT | Performed by: PSYCHIATRY & NEUROLOGY

## 2018-12-06 PROCEDURE — 25000132 ZZH RX MED GY IP 250 OP 250 PS 637: Performed by: PSYCHIATRY & NEUROLOGY

## 2018-12-06 PROCEDURE — 25000132 ZZH RX MED GY IP 250 OP 250 PS 637: Performed by: PHYSICIAN ASSISTANT

## 2018-12-06 RX ADMIN — ALBUTEROL SULFATE 2 PUFF: 90 AEROSOL, METERED RESPIRATORY (INHALATION) at 08:31

## 2018-12-06 RX ADMIN — FLUTICASONE PROPIONATE 2 SPRAY: 50 SPRAY, METERED NASAL at 08:34

## 2018-12-06 RX ADMIN — Medication 350 MG: at 16:35

## 2018-12-06 RX ADMIN — MULTIPLE VITAMINS W/ MINERALS TAB 1 TABLET: TAB at 08:36

## 2018-12-06 RX ADMIN — HYDROXYZINE HYDROCHLORIDE 25 MG: 25 TABLET, FILM COATED ORAL at 02:54

## 2018-12-06 RX ADMIN — METOPROLOL SUCCINATE 25 MG: 25 TABLET, EXTENDED RELEASE ORAL at 08:32

## 2018-12-06 RX ADMIN — ALBUTEROL SULFATE 2 PUFF: 90 AEROSOL, METERED RESPIRATORY (INHALATION) at 17:12

## 2018-12-06 RX ADMIN — Medication 350 MG: at 02:54

## 2018-12-06 RX ADMIN — TOPIRAMATE 75 MG: 50 TABLET ORAL at 13:56

## 2018-12-06 RX ADMIN — GUAIFENESIN 600 MG: 600 TABLET, EXTENDED RELEASE ORAL at 08:32

## 2018-12-06 RX ADMIN — OXYCODONE HYDROCHLORIDE AND ACETAMINOPHEN 2 TABLET: 5; 325 TABLET ORAL at 02:54

## 2018-12-06 RX ADMIN — LISINOPRIL 5 MG: 5 TABLET ORAL at 08:32

## 2018-12-06 RX ADMIN — GABAPENTIN 800 MG: 800 TABLET, FILM COATED ORAL at 13:56

## 2018-12-06 RX ADMIN — OXYCODONE HYDROCHLORIDE AND ACETAMINOPHEN 2 TABLET: 5; 325 TABLET ORAL at 16:35

## 2018-12-06 RX ADMIN — TOPIRAMATE 75 MG: 50 TABLET ORAL at 08:31

## 2018-12-06 RX ADMIN — Medication 350 MG: at 09:54

## 2018-12-06 RX ADMIN — GABAPENTIN 800 MG: 800 TABLET, FILM COATED ORAL at 08:32

## 2018-12-06 RX ADMIN — DULOXETINE HYDROCHLORIDE 40 MG: 20 CAPSULE, DELAYED RELEASE ORAL at 08:32

## 2018-12-06 RX ADMIN — OMEPRAZOLE 20 MG: 20 CAPSULE, DELAYED RELEASE ORAL at 08:32

## 2018-12-06 RX ADMIN — OXYCODONE HYDROCHLORIDE AND ACETAMINOPHEN 2 TABLET: 5; 325 TABLET ORAL at 09:54

## 2018-12-06 ASSESSMENT — ACTIVITIES OF DAILY LIVING (ADL)
LAUNDRY: UNABLE TO COMPLETE
GROOMING: INDEPENDENT
GROOMING: INDEPENDENT
ORAL_HYGIENE: INDEPENDENT
DRESS: INDEPENDENT
ORAL_HYGIENE: INDEPENDENT
DRESS: INDEPENDENT
ORAL_HYGIENE: INDEPENDENT
DRESS: INDEPENDENT
GROOMING: INDEPENDENT
LAUNDRY: UNABLE TO COMPLETE

## 2018-12-06 NOTE — PROGRESS NOTES
Pt plans discharge this lela at 1700. Plans on cab to [facility] [address] [facility]. Pt has housing arranged via [facility]. Pt has been packing.

## 2018-12-06 NOTE — PROGRESS NOTES
"Pt visible in milieu and social with peers throughout evening. Pt attended group and participated in group activities. Pt endorses depression at 10/10 and anxiety at 9/10. Pt has SI and says \"I am more suicidal than before.\" Pt feels frustrated with her care, specifically around discharge planning. Pt states she is told by [facility] and  to make important phone calls; however pt states that when she attempts to make calls staff does not allow due to phone restrictions. Pt is frustrated that this was not communicated across care team. Pt also feels frustrated that she must take the time to complete these tasks when she would want to be in the therapeutic groups. Pt says this is a cause of much of her anxiety. Writer assured pt that her complaints are noted and will be communicated.     12/05/18 2200   Behavioral Health   Hallucinations denies / not responding to hallucinations   Thinking intact   Orientation person: oriented;place: oriented;date: oriented;time: oriented   Memory baseline memory   Insight admits / accepts   Judgement impaired   Eye Contact at examiner   Affect full range affect   Mood depressed;anxious   Physical Appearance/Attire attire appropriate to age and situation   Hygiene well groomed   Suicidality thoughts only   1. Wish to be Dead Yes   2. Non-Specific Active Suicidal Thoughts  Yes   Self Injury other (see comment)  (denies)   Elopement (none)   Activity (visible and social in miliue)   Speech clear;coherent   Medication Sensitivity no observed side effects;no stated side effects   Psychomotor / Gait balanced;steady   Psycho Education   Type of Intervention 1:1 intervention   Response participates, initiates socially appropriate   Hours 0.5   Treatment Detail check in    Group Therapy Session   Group Attendance attended group session   Behavioral Health Interventions   Depression maintain safety precautions;maintain safe secure environment;assist patient in developing safety plan;assist patient " in following safety plan;provide emotional support;assist with developing and utilizing healthy coping strategies;build upon strengths   Social and Therapeutic Interventions (Depression) encourage socialization with peers;encourage effective boundaries with peers;encourage participation in therapeutic groups and milieu activities

## 2018-12-06 NOTE — PROGRESS NOTES
Attended 1 of 2 OT groups. She was social, work was organized on assorted tasks on the IPAD. 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 Affect was bright. She stated plans for discharge today.

## 2018-12-06 NOTE — DISCHARGE INSTRUCTIONS
Behavioral Discharge Planning and Instructions      Summary:  You were admitted on 11/24/2018  due to Depression and Suicidal Ideations.  You were treated by Dr. Penelope Morin MD and discharged on 12/4/2018 from [facility] to [facility].  [facility]:   [address] [place] [address]  [phone]     Principal Diagnosis:    Major Depressive disorder Recurrent, Moderate      Health Care Follow-up Appointments:   PCP:  Bibi Chau  [place] [address] [place] [address]  Date: Monday, December 10th at 1:00 pm    Therapist:  John Campbell  [address] [place], [place]  [phone]  Date:  Wednesday, December 26th at 11:00am.  If you miss this appointment, you will not be able to reschedule again due to missed appointments..    [facility]:  [place] [address] [place] [id number]   Phone: [phone]    Attend all scheduled appointments with your outpatient providers. Call at least 24 hours in advance if you need to reschedule an appointment to ensure continued access to your outpatient providers.   Major Treatments, Procedures and Findings:  You were provided with: a psychiatric assessment, assessed for medical stability, medication evaluation and/or management, group therapy and milieu management    Symptoms to Report: feeling more aggressive, increased confusion, losing more sleep, mood getting worse or thoughts of suicide    Early warning signs can include: increased depression or anxiety sleep disturbances increased thoughts or behaviors of suicide or self-harm  increased unusual thinking, such as paranoia or hearing voices    Safety and Wellness:  Take all medicines as directed.  Make no changes unless your doctor suggests them.      Follow treatment recommendations.  Refrain from alcohol and non-prescribed drugs.  If there is a concern for safety, call [phone].    Resources:   Crisis Intervention:  [phone] or [phone] (TTY: [phone]).  Call anytime for help.  [facility] Crisis Response [phone]    The treatment team has appreciated the opportunity to work with you.     If you have any questions or concerns our unit number is [phone].

## 2018-12-06 NOTE — PROGRESS NOTES
12/06/18 1339   Behavioral Health   Hallucinations denies / not responding to hallucinations   Thinking intact   Orientation person: oriented;place: oriented;date: oriented   Memory baseline memory   Insight admits / accepts   Judgement impaired   Eye Contact at examiner   Affect full range affect   Mood anxious   Physical Appearance/Attire attire appropriate to age and situation   Hygiene well groomed   Suicidality (Pt denies )   1. Wish to be Dead No   2. Non-Specific Active Suicidal Thoughts  No   Self Injury (Pt denies )   Elopement (none )   Activity (Pt visible int he milieu )   Speech clear;coherent   Medication Sensitivity no stated side effects;no observed side effects   Psychomotor / Gait balanced;steady   Activities of Daily Living   Hygiene/Grooming independent   Oral Hygiene independent   Dress independent   Laundry unable to complete   Room Organization independent   Behavioral Health Interventions   Depression maintain safety precautions;provide emotional support;establish therapeutic relationship;assist with developing and utilizing healthy coping strategies;build upon strengths   Social and Therapeutic Interventions (Depression) encourage socialization with peers;encourage effective boundaries with peers;encourage participation in therapeutic groups and milieu activities   Pt denies SI and SIB. Pt endorsed anxiety and depression and rated them both a 9/10. Pt stated that she is looking forward to being discharged. Pt attended some groups during the day. Pt was visible in the milieu during the day and was seen socializing in the lounge with other patients.

## 2018-12-06 NOTE — DISCHARGE SUMMARY
Psychiatric Discharge Summary    Lilliana Cardenas MRN# [id number]   Age: 63 year old YOB: 1955     Date of Admission:  11/24/2018  Date of Discharge:  12/6/2018  7:03 PM  Admitting Physician:  Khari Urrutia MD  Discharge Physician:  Nigel Melendez MD          Event Leading to Hospitalization:   HISTORY OF PRESENT ILLNESS:  The patient has a history of major depressive disorder and chronic pain who presented to the emergency room voluntarily reporting depressed mood and suicidal ideation.  In the emergency room she reported experiencing thoughts to overdose on medications as a means of suicide.  She was not able to contract for safety if discharged from the emergency room citing that she was homeless and overwhelmed by psychosocial stressors.  Her urine drug screen was negative in the emergency room.  She was admitted to our Behavioral Health Unit voluntarily.         On examination today, she explains that over the past few months her mood has gradually become more depressed in the midst of various psychosocial stressors.  These stressors predominantly involve financial stress and unstable housing.  She explains that she was previously living in an assisted living facility; however, encountered some issues there that she does not care to discuss today which eventually led to her leaving the facility.  Since then her housing situation has been unstable and had been staying with a friend, however, was encountering some difficulties with that person as well.  Feeling helpless and hopeless in the midst of her unstable housing situation, she contemplated suicide as a means of escape, however, came to the emergency room to seek help instead.  She further adds that she was hospitalized for medical treatment at an outside facility a few months ago.  She had described feeling depressed and they had consulted with her psychiatrist who had recommended that the patient transition off of her  antidepressants which included Prozac and Wellbutrin and transition on to Seroquel after being discharged home.  She initially felt that the Seroquel was helpful; however, it quickly lost benefit and depressive symptoms resumed.  She stopped taking the Seroquel 2-3 weeks ago and her mood has been worsening throughout that time.        See Admission note for additional details.          DIagnoses:     Major depressive disorder, recurrent, moderate.         Labs:          Lab Results   Component Value Date     11/25/2018    Lab Results   Component Value Date    CHLORIDE 110 11/25/2018    Lab Results   Component Value Date    BUN 10 11/25/2018      Lab Results   Component Value Date    POTASSIUM 3.8 11/25/2018    Lab Results   Component Value Date    CO2 22 11/25/2018    Lab Results   Component Value Date    CR 0.71 11/25/2018        Lab Results   Component Value Date    WBC 8.0 11/25/2018    HGB 12.0 11/25/2018    HCT 37.0 11/25/2018    MCV 93 11/25/2018     11/25/2018     Lab Results   Component Value Date    AST 11 11/25/2018    ALT 17 11/25/2018    ALKPHOS 98 11/25/2018    BILITOTAL 0.4 11/25/2018     Lab Results   Component Value Date    TSH 1.45 11/25/2018            Consults:   Consultation during this admission received from internal medicine:    ASSESSMENT & RECOMMENDATIONS:  #Depression, SI, anxiety: Utox negative. CMP, CBC, TSH, glucose. Endorses worsening depression.   -Management per psychiatry  #Chronic back pain: Follows with pain&palliative. Notes 5 back surgeries in past, chronic low back pain, left LE tingling that has been present for years. No new incontinence, worsening pain or saddle paresthesias. Plan for tapering of opiates at some point per 10/23/2018 note. Has been out for weeks per patient.   -Continue OP f/u with pain management  -Can continue PRN percocet from medicine standpoint w/ plans to wean as OP  -Continue gabapentin 800 mg TID  -Continue ketoprofen Q4 hours  PRN  -Continue Topamax 75 mg TID  -Recommend holding Soma as this is typically used for short term therapy  -Continue tylenol PRN  -Has appt with pain/palliative tomorrow for chronic pain management, caudal steroid injection- will need to reschedule on discharge  #Migraines: Continue Topamax and PRN Imitrex.   #HTN: PTA maintained on metoprolol 25 mg qday, lisinopril 5 mg qday. BP normal now on home meds. Can continue home BP meds.   #RLS: PTA maintained on Requip, Mirapex. Management per psychiatry.   #GERD: PTA on prilosec. Stable symptoms. Can continue.          Hospital Course:   Lilliana Cardenas was admitted to [facility] with attending [facility] The patient was placed under status 15 (15 minute checks) to ensure patient safety.   CBC, BMP and utox obtained.    All outpatient medications were continued with the exception of Celexa.     Medications:  -- Celexa was discontinued on admission.   -- Cymbalta: started and titrated to 40 mg daily. Increased to 60 mg on 12/05.   -- Gabapentin: continued at 800 mg TID.   -- Topamax: continued at 75 mg TID.   -- Requip: continued at 0.75 mg qhs. No refills will be issued on discharge. Patient has 1 month supply  -- Percocet: continued at 1-2 tab qid prn for severe pain but was advised to limit use.  No refills or prescription will be issued on discharge. Patient started that she has refill at pharmacy.     Lilliana Cardenas did participate in groups and was visible in the milieu.     The patient's symptoms of depression improved.     Lilliana Cardenas was released to shelter. At the time of discharge Lilliana Cardenas was determined to not be a danger to herself or others. At the current time of discharge, the patient does not meet criteria for involuntary hospitalization. On the day of discharge, the patient reports that they do not have suicidal or homicidal ideation and would  never hurt themselves or others. Steps taken to minimize risk include: assessing patient s behavior and thought process daily during hospital stay, discharging patient with adequate plan for follow up for mental and physical health and discussing safety plan of returning to the hospital should the patient ever have thoughts of harming themselves or others. Therefore, based on all available evidence including the factors cited above, the patient does not appear to be at imminent risk for self-harm, and is appropriate for outpatient level of care.           Discharge Medications:     Current Discharge Medication List      START taking these medications    Details   albuterol (PROAIR HFA/PROVENTIL HFA/VENTOLIN HFA) 108 (90 Base) MCG/ACT inhaler Inhale 2 puffs into the lungs 4 times daily as needed for other (dyspnea)  Qty: 1 Inhaler, Refills: 1    Associated Diagnoses: Mild intermittent asthma without complication      DULoxetine (CYMBALTA) 60 MG capsule Take 1 capsule (60 mg) by mouth daily  Qty: 30 capsule, Refills: 1    Associated Diagnoses: Depression with anxiety      lisinopril (PRINIVIL/ZESTRIL) 5 MG tablet Take 1 tablet (5 mg) by mouth daily  Qty: 30 tablet, Refills: 0    Associated Diagnoses: Benign essential hypertension         CONTINUE these medications which have CHANGED    Details   Carisoprodol 250 MG TABS Take 250 mg by mouth 4 times daily as needed  Qty: 16 tablet, Refills: 0    Associated Diagnoses: Chronic pain syndrome      fluticasone (FLONASE) 50 MCG/ACT nasal spray Spray 2 sprays into both nostrils daily  Qty: 1 Bottle, Refills: 0    Associated Diagnoses: Dry nose      gabapentin (NEURONTIN) 800 MG tablet Take 1 tablet (800 mg) by mouth 3 times daily  Qty: 90 tablet, Refills: 0    Associated Diagnoses: Depression with anxiety; Chronic pain syndrome; Chronic low back pain, unspecified back pain laterality, with sciatica presence unspecified      metoprolol succinate ER (TOPROL-XL) 25 MG 24 hr  tablet Take 1 tablet (25 mg) by mouth daily  Qty: 30 tablet, Refills: 0    Associated Diagnoses: Benign essential hypertension      multivitamin w/minerals (THERA-VIT-M) tablet Take 1 tablet by mouth daily  Qty: 30 tablet, Refills: 0    Associated Diagnoses: Vitamin deficiency      omeprazole (PRILOSEC) 20 MG DR capsule Take 1 capsule (20 mg) by mouth daily  Qty: 30 capsule, Refills: 0    Associated Diagnoses: Gastroesophageal reflux disease without esophagitis      topiramate (TOPAMAX) 25 MG tablet Take 3 tablets (75 mg) by mouth 3 times daily  Qty: 270 tablet, Refills: 0    Associated Diagnoses: Migraine without status migrainosus, not intractable, unspecified migraine type; Chronic pain syndrome      traZODone (DESYREL) 50 MG tablet Take 1 tablet (50 mg) by mouth nightly as needed for sleep  Qty: 30 tablet, Refills: 0    Associated Diagnoses: Psychophysiological insomnia         CONTINUE these medications which have NOT CHANGED    Details   hydrOXYzine (ATARAX) 25 MG tablet Take 1 tablet (25 mg) by mouth 4 times daily as needed for itching  Qty: 120 tablet, Refills: 1    Associated Diagnoses: Moderate major depression (H)      ketoprofen 10% in PLO 10% topical gel Place 2-4 g onto the skin every 4 hours as needed for moderate pain  Qty: 200 g, Refills: 1    Associated Diagnoses: Arthritis      oxyCODONE-acetaminophen (PERCOCET) 5-325 MG per tablet 1-2 tabs every 6 hours as needed, ok to dispense on 11/14/18 and start 11/16/18  Qty: 180 tablet, Refills: 0    Associated Diagnoses: Chronic pain syndrome      pramipexole (MIRAPEX) 0.125 MG tablet Take 0.125 mg by mouth At Bedtime      rOPINIRole (REQUIP) 0.25 MG tablet Take 3 tablets (0.75 mg) by mouth At Bedtime  Qty: 270 tablet, Refills: 0    Associated Diagnoses: Chronic pain syndrome      SUMAtriptan (IMITREX) 50 MG tablet Take 1 tablet (50 mg) by mouth at onset of headache May repeat in 2 hours if needed: max 2/day; average number of headaches monthly 4; Per  [facility], this is a current medication  Qty: 30 tablet, Refills: 0    Associated Diagnoses: Chronic pain syndrome         STOP taking these medications       citalopram (CELEXA) 20 MG tablet Comments:   Reason for Stopping:                    Psychiatric Examination:     The patient was not seen on the day of discharge.           Discharge Plan:   Continue medications as above.     Health Care Follow-up Appointments:   PCP:  Bibi Chau  [place] [address] [facility], [place] [address]  Date: Monday, December 10th at 1:00 pm     Therapist:  John Campbell  [address], [place]  [phone]  Date:  Wednesday, December 26th at 11:00am.  If you miss this appointment, you will not be able to reschedule again due to missed appointments..     Adult Shelter Connect:  [facility]  [address] [place] [address]   Phone: [phone]     Attend all scheduled appointments with your outpatient providers. Call at least 24 hours in advance if you need to reschedule an appointment to ensure continued access to your outpatient providers.   Major Treatments, Procedures and Findings:  You were provided with: a psychiatric assessment, assessed for medical stability, medication evaluation and/or management, group therapy and milieu management     Symptoms to Report: feeling more aggressive, increased confusion, losing more sleep, mood getting worse or thoughts of suicide     Early warning signs can include: increased depression or anxiety sleep disturbances increased thoughts or behaviors of suicide or self-harm  increased unusual thinking, such as paranoia or hearing voices     Safety and Wellness:  Take all medicines as directed.  Make no changes unless your doctor suggests them.      Follow treatment recommendations.  Refrain from alcohol and non-prescribed drugs.  If there is a concern for safety, call 911.     Resources:   Crisis Intervention:  [phone] or [phone] (TTY: [phone]).  Call anytime for help.  [facility] Crisis Response [phone]    Attestation:  The patient was seen and evaluated by me. I spent less than 30 minutes on discharge day activities. Nigel Melendez MD

## 2018-12-06 NOTE — PROGRESS NOTES
Brief Medicine Note    Internal Medicine following for urinary incontinence seen on 12/4/18.     Patient reports having urinary incontinence for a few months, associated with a sudden onset of urgency where she will have to hurry to use the bathroom, followed by dribbling and incontinence. She denies any hematuria, flank pain, dysuria or evidence of incontinence in the past. She reports laughing and coughing make it worse.  She does note drinking coffee in the morning but no other caffeine exposure. She has had 2 vaginal births in the past.     Urinary incontinence, unspecified:   UA with large LE, WBC 16, Few bacteria. UC grew <10,000 urogenital rafaela; therefore no indication for antibiotics as no UTI. Patient without urinary retention. Bladder scan 12/4/18 negative for acute urinary retention/overflow incontinence. Given her hx of a few months worth of urinary incontinence and history, most likely 2/2 urge incontinence, overactive bladder, stress incontinence.   -Kegel exercises were discussed  -Avoid triggers including caffeine, oral intake 2 hours prior to sleeping  -She should follow-up with PCP or Ob/Gyn within 7 days for further work-up  -May benefit from medication such as oxybutynin in the future and referral to pelvic floor PT      No further medical intervention is required at this time. Medicine signing off. Please feel free to call with any questions.     Dary Carter PA-C  Hospitalist Service  Pager [phone]

## 2018-12-06 NOTE — PROGRESS NOTES
"Pt reported feeling suicidal when her assigned staff did check in.  Writer followed up - pt endorses passive SI that is now constant - denies any specific plan at this time and contracts for safety.  States she feels high anxiety due to \"not being able to figure out my housing and insurance\" due to inability to access her e-mail, inability to use the unit phones outside group time and not getting complete phone messages from staff when she is unable to use the phone - \"some of these places call me back, but then I don't know who it was and the messages aren't clear.\"  Pt expressed frustration due to not being able to complete the \"stuff I need to get done so I can get out of here.\"  \"You guys want me to figure out all this stuff - and then you don't give me the tools I need to get it done.\"  Pt also expressed frustration due to funds not being in her account that should be - states she needs to contact her  who manages her money tomorrow because that person did not work today.    Pt asked if her Percocet prescription from outside provider can be filled as part of her discharge medications.  Pt informed that she will need to bring the signed prescription to the outpatient pharmacy in the professional building after discharge to have it filled.    Pt reported she felt as though she was \"running a temp - it was 99 in my ear earlier today - which means it was really 100.\"  This shift pt's temp orally:  87.6, tympanic: 87.4.  Pt reported one incident of urinary incontinence.      Urine culture results pending.    PRN Percocet  mg and PRN Soma 350 mg given at 1931 for low back pain  PRN Vistaril 25 mg and Trazodone 50 mg at HS  "

## 2018-12-07 ENCOUNTER — TELEPHONE (OUTPATIENT)
Dept: INTERNAL MEDICINE | Facility: CLINIC | Age: 63
End: 2018-12-07

## 2018-12-07 NOTE — PROGRESS NOTES
Pt denies SI/SIB, pt answered yes to wishing to be dead, pt verbalized this is chronic for her, she has a safety plan and denies wanting to act on her wish to be dead. Pt states adequate pain management and sudoko help her cope with depression. Survey given to pt. AVS reviewed with pt, questions answered, verbalized understanding. Pt left unit at 1835 to a shelter in [place] called [facility], pt took a taxi, staff witnessed pt getting into taxi.  Pt left with all belongings, medications and security items.

## 2018-12-07 NOTE — TELEPHONE ENCOUNTER
Per December 2018 eligibility query through [employer] patient has [employer] Medicare Advantage (Medicare replacement plan) as of 12-1-2018.    Carmencita Mantua  Patient Representative  [facility]

## 2018-12-07 NOTE — TELEPHONE ENCOUNTER
IP F/U    Date: 12/06/18  Diagnosis: Depression with Anxiety, Migraine Without Status Migrainosus, Not Intractable, Unspecified Migraine Type  Is patient active in care coordination? No  Was patient in TCU? No    Next 5 appointments (look out 90 days)     Dec 10, 2018  1:00 PM CST   [facility] with Bibi Chau NP   [facility]    [address]  [facility] [address]   [phone]            Dec 18, 2018 11:30 AM CST   Return Visit with Maximiliano Sanchez MD   [facility] ([facility])    [address]  [facility] [address] [phone]

## 2018-12-09 NOTE — PROGRESS NOTES
[facility] - Procedure Note    Date of Service: 12/10/2018    Procedure performed:None  Diagnosis: Lumbar spondylosis; Lumbar radiculitis/radiculopathy  : Maximiliano Sanchez DO   Anesthesia: none      Patient presented today for a caudal epidural after being seen in the clinic myself. Her temperature in clinic today 99.5 and 99.7 and she admits to feeling unwell with chills as well. Due to concern for acute illness the procedure was canceled. She will be seen by me in clinic next week and I will place the order again if appropriate at that time.        DO [facility]

## 2018-12-10 ENCOUNTER — OFFICE VISIT (OUTPATIENT)
Dept: INTERNAL MEDICINE | Facility: CLINIC | Age: 63
End: 2018-12-10
Payer: COMMERCIAL

## 2018-12-10 ENCOUNTER — RADIOLOGY INJECTION OFFICE VISIT (OUTPATIENT)
Dept: PALLIATIVE MEDICINE | Facility: CLINIC | Age: 63
End: 2018-12-10
Payer: MEDICAID

## 2018-12-10 VITALS
DIASTOLIC BLOOD PRESSURE: 88 MMHG | HEART RATE: 104 BPM | OXYGEN SATURATION: 97 % | TEMPERATURE: 99.7 F | SYSTOLIC BLOOD PRESSURE: 134 MMHG

## 2018-12-10 VITALS
HEART RATE: 111 BPM | RESPIRATION RATE: 16 BRPM | WEIGHT: 181.4 LBS | BODY MASS INDEX: 33.18 KG/M2 | TEMPERATURE: 99.5 F | OXYGEN SATURATION: 97 % | SYSTOLIC BLOOD PRESSURE: 144 MMHG | DIASTOLIC BLOOD PRESSURE: 76 MMHG

## 2018-12-10 DIAGNOSIS — M54.5 CHRONIC LOW BACK PAIN, UNSPECIFIED BACK PAIN LATERALITY, WITH SCIATICA PRESENCE UNSPECIFIED: Primary | ICD-10-CM

## 2018-12-10 DIAGNOSIS — F32.1 MODERATE MAJOR DEPRESSION (H): ICD-10-CM

## 2018-12-10 DIAGNOSIS — M47.816 SPONDYLOSIS OF LUMBAR REGION WITHOUT MYELOPATHY OR RADICULOPATHY: ICD-10-CM

## 2018-12-10 DIAGNOSIS — G89.29 CHRONIC LOW BACK PAIN, UNSPECIFIED BACK PAIN LATERALITY, WITH SCIATICA PRESENCE UNSPECIFIED: Primary | ICD-10-CM

## 2018-12-10 PROCEDURE — 99213 OFFICE O/P EST LOW 20 MIN: CPT | Performed by: NURSE PRACTITIONER

## 2018-12-10 PROCEDURE — 99207 ZZC NO BILLABLE SERVICE THIS VISIT: CPT | Mod: 25 | Performed by: PHYSICAL MEDICINE & REHABILITATION

## 2018-12-10 ASSESSMENT — PATIENT HEALTH QUESTIONNAIRE - PHQ9: SUM OF ALL RESPONSES TO PHQ QUESTIONS 1-9: 21

## 2018-12-10 NOTE — PROGRESS NOTES
SUBJECTIVE:   Lilliana Cardenas is a 63 year old female who presents to clinic today for the following health issues:          Hospital Follow-up Visit:    Hospital/Nursing Home/IP Rehab Facility: [facility]  Date of Admission: 11/24/2018  Date of Discharge: 12/06/2018  Reason(s) for Admission: depression, htn, migraines            Problems taking medications regularly:  None       Medication changes since discharge: None       Problems adhering to non-medication therapy:  None    Summary of hospitalization:  [facility] discharge summary reviewed  Diagnostic Tests/Treatments reviewed.  Follow up needed: lumbar epidural injection today, continued psychiatric care.  Other Healthcare Providers Involved in Patient s Care:         None  Update since discharge: improved.     Post Discharge Medication Reconciliation: discharge medications reconciled, continue medications without change.  Plan of care communicated with patient     Coding guidelines for this visit:  Type of Medical   Decision Making Face-to-Face Visit       within 7 Days of discharge Face-to-Face Visit        within 14 days of discharge   Moderate Complexity [id number]   High Complexity 33286 [id number]                  Problem list and histories reviewed & adjusted, as indicated.  Additional history: as documented    Patient Active Problem List   Diagnosis     Chronic pain syndrome     Chronic low back pain     Opiate dependence (H)     Overdose     Moderate major depression (H)     Depression     Past Surgical History:   Procedure Laterality Date     GYN SURGERY         Social History     Tobacco Use     Smoking status: Former Smoker     Packs/day: 1.00     Years: 38.00     Pack years: 38.00     Smokeless tobacco: Never Used     Tobacco comment: pt states she has smoked on & off for since she was 18   Substance Use Topics     Alcohol use: No     History reviewed. No pertinent family history.      Current Outpatient  Medications   Medication Sig Dispense Refill     albuterol (PROAIR HFA/PROVENTIL HFA/VENTOLIN HFA) 108 (90 Base) MCG/ACT inhaler Inhale 2 puffs into the lungs 4 times daily as needed for other (dyspnea) 1 Inhaler 1     Carisoprodol 250 MG TABS Take 250 mg by mouth 4 times daily as needed 16 tablet 0     DULoxetine (CYMBALTA) 60 MG capsule Take 1 capsule (60 mg) by mouth daily 30 capsule 1     fluticasone (FLONASE) 50 MCG/ACT nasal spray Spray 2 sprays into both nostrils daily 1 Bottle 0     gabapentin (NEURONTIN) 800 MG tablet Take 1 tablet (800 mg) by mouth 3 times daily 90 tablet 0     hydrOXYzine (ATARAX) 25 MG tablet Take 1 tablet (25 mg) by mouth 4 times daily as needed for itching 120 tablet 1     ketoprofen 10% in PLO 10% topical gel Place 2-4 g onto the skin every 4 hours as needed for moderate pain 200 g 1     lisinopril (PRINIVIL/ZESTRIL) 5 MG tablet Take 1 tablet (5 mg) by mouth daily 30 tablet 0     metoprolol succinate ER (TOPROL-XL) 25 MG 24 hr tablet Take 1 tablet (25 mg) by mouth daily 30 tablet 0     multivitamin w/minerals (THERA-VIT-M) tablet Take 1 tablet by mouth daily 30 tablet 0     omeprazole (PRILOSEC) 20 MG DR capsule Take 1 capsule (20 mg) by mouth daily 30 capsule 0     oxyCODONE-acetaminophen (PERCOCET) 5-325 MG per tablet 1-2 tabs every 6 hours as needed, ok to dispense on 11/14/18 and start 11/16/18 180 tablet 0     pramipexole (MIRAPEX) 0.125 MG tablet Take 0.125 mg by mouth At Bedtime       rOPINIRole (REQUIP) 0.25 MG tablet Take 3 tablets (0.75 mg) by mouth At Bedtime 270 tablet 0     SUMAtriptan (IMITREX) 50 MG tablet Take 1 tablet (50 mg) by mouth at onset of headache May repeat in 2 hours if needed: max 2/day; average number of headaches monthly 4; Per [facility], this is a current medication 30 tablet 0     topiramate (TOPAMAX) 25 MG tablet Take 3 tablets (75 mg) by mouth 3 times daily 270 tablet 0     traZODone (DESYREL) 50 MG tablet Take 1 tablet (50 mg)  by mouth nightly as needed for sleep 30 tablet 0     BP Readings from Last 3 Encounters:   12/10/18 144/76   12/06/18 131/58   10/23/18 160/87    Wt Readings from Last 3 Encounters:   12/10/18 82.3 kg (181 lb 6.4 oz)   12/06/18 81.7 kg (180 lb 3.2 oz)   10/17/18 74.8 kg (165 lb)                    Reviewed and updated as needed this visit by clinical staff  Tobacco  Allergies  Meds  Med Hx  Surg Hx  Fam Hx  Soc Hx      Reviewed and updated as needed this visit by Provider         ROS:  Constitutional, HEENT, cardiovascular, pulmonary, gi and gu systems are negative, except as otherwise noted.    OBJECTIVE:     /76   Pulse 111   Temp 99.5  F (37.5  C) (Oral)   Resp 16   Wt 82.3 kg (181 lb 6.4 oz)   SpO2 97%   BMI 33.18 kg/m    Body mass index is 33.18 kg/m .  GENERAL: healthy, alert and no distress  PSYCH: mentation appears normal, affect normal/bright, good historian        ASSESSMENT/PLAN:               ICD-10-CM    1. Chronic low back pain, unspecified back pain laterality, with sciatica presence unspecified M54.5     G89.29    2. Moderate major depression (H) F32.1        F/u 3 months and prn    Bibi Chau NP  [facility]

## 2018-12-10 NOTE — NURSING NOTE
Pre-procedure Intake    Have you been fasting? No     If yes, for how long?     Are you taking a prescribed blood thinner such as coumadin, Plavix, Xarelto?    No    If yes, when did you take your last dose?     Do you take aspirin?  No    If cervical procedure, have you held aspirin for 6 days?   NA    Do you have any allergies to contrast dye, iodine, steroid and/or numbing medications?  NO    Are you currently taking antibiotics or have an active infection?  NO    Have you had a fever/elevated temperature within the past week? YES: low grade 99.7    Are you currently taking oral steroids? NO    Do you have a ?     Are you pregnant or breastfeeding?  NO    Are the vital signs normal?  No:

## 2018-12-13 ENCOUNTER — TELEPHONE (OUTPATIENT)
Dept: PALLIATIVE MEDICINE | Facility: CLINIC | Age: 63
End: 2018-12-13

## 2018-12-13 NOTE — TELEPHONE ENCOUNTER
This medication was written by an inpatient doctor.   Called patient who said that she does not need this medication, states that pharmacies call her daily to see if she needs these types of medications as a way for them to make money. She states if she does not call herself to request to disregard    Leona LAWS, RN Care Coordinator  [facility]

## 2018-12-13 NOTE — TELEPHONE ENCOUNTER
Received call from [facility] stating that they had faxed over a refill request for Lidocaine 5% Ointment on 11-7-18. Advised documentation of that request being received. They state that they can take a verbal over the phone. Phone #[phone]  Ext. [phone] -- Patient reference #[id number]        Aditi Mayen    [facility]

## 2018-12-16 NOTE — PROGRESS NOTES
[facility]    Date of visit: 12/17/2018    Chief complaint:   Chief Complaint   Patient presents with     Pain     needs refill        Interval history:  Lilliana Cardenas is a 63 year old female last seen by me on 10/23/18.      Recommendations/plan at the last visit included:  1. Physical Therapy: Will refer to PT to help develop a HEP targetting the back and tight hip girdle muscles. Will consider chronic pain PT in the future once stressors in her life have calmed down.  2. Clinical Health Pain Psychologist: Would certainly benefit from biofeed back/coping strategies, again will refer in the future once current psychosocial stressors have decreased.   3. Self Care Recommendations: Gentle progressive exercise that does not increase pain - gradually increase daily walking program.  Take mini breaks - 5 minutes of mindfullness a couple times a day.   4. Diagnostic Studies: none at this time  5. Medication Management:   1. Prescribed ketoprofen 10% gel at patient's request as this has been helpful for aching joints in the past.  2. Continue other medications are currently prescribed  1. Oxycodone 5-10/325 every 6 hours prn - will discuss decreasing this in the future as we get her pain better controlled. Patient agrees with long term weaning plan, states that she doesn't take this medication as often as it is prescribed at this time.  2. Gabapentin 800mg TID  3. Topamax 75mg TID  4. Sumatriptan 50mg prn  6. Further procedures recommended:   1. Bilateral greater trochanter bursa injections performed in clinic on 10/23/18.  2. Caudal epidural steroid injection ordered, will be scheduled 1 month after today as she just had a steroid injection in clinic today.  7. Referrals: none  8. Follow up: 8 weeks in clinic       Since her last visit, Lilliana Cardenas reports:  -She had bilateral GT bursa injections on 10/23 with ongoing benefit, hip pain has been  better.  -She checked her self into psychiatric treatment at the  because of depression and suicidal ideations. She was started on duloxetine during this time and is finding it very helpful.  -She is living at [facility] house, she will be staying there for 10-15 days before she finds a place that she can live.  -She was scheduled for a lumbar epidural on 12/13 but this was canceled as she wasn't feeling well. She continues to have a cough but no more fevers or chills.    Pain scores:  Pain intensity on average is 7 on a scale of 0-10.     Current pain treatments:        Current pain medications:  -Topiramate 75mg TID  -Hydroxyzine 25 mg QID prn  -Gabapentin 800mg TID  -Trazodone 50mg hs prn (3-4 times/week)  -Carisoprodol 250mg QID prn  -Percocet 1-2 5/235 tablets q6h prn (takes 2 tablets/day)  -Imitrex 50mg prn  -Cymbalta 60mg qd     Past pain treatments:  2. Physical Therapy: last PT was 5+ years ago                TENS unit: helps a little, doesn't have it with her.   3. Pain psychology: did this at [facility] was helpful  4. Surgery: SCSx2, lumbar surgeries x5 with decompression and fusion in 2009  5. Injections: epidurals were helpful, last was 4 years ago  6. Alternative Therapies:               Chiropractic: didn't like               Acupuncture: helpful           Side Effects: no side effect    Medications:  Current Outpatient Medications   Medication Sig Dispense Refill     albuterol (PROAIR HFA/PROVENTIL HFA/VENTOLIN HFA) 108 (90 Base) MCG/ACT inhaler Inhale 2 puffs into the lungs 4 times daily as needed for other (dyspnea) 1 Inhaler 1     Carisoprodol 250 MG TABS Take 250 mg by mouth 4 times daily as needed 16 tablet 0     DULoxetine (CYMBALTA) 60 MG capsule Take 1 capsule (60 mg) by mouth daily 30 capsule 1     fluticasone (FLONASE) 50 MCG/ACT nasal spray Spray 2 sprays into both nostrils daily 1 Bottle 0     gabapentin (NEURONTIN) 800 MG tablet Take 1 tablet (800 mg) by mouth 3 times daily 90 tablet 0      hydrOXYzine (ATARAX) 25 MG tablet Take 1 tablet (25 mg) by mouth 4 times daily as needed for itching 120 tablet 1     ketoprofen 10% in PLO 10% topical gel Place 2-4 g onto the skin every 4 hours as needed for moderate pain 200 g 1     lisinopril (PRINIVIL/ZESTRIL) 5 MG tablet Take 1 tablet (5 mg) by mouth daily 30 tablet 0     metoprolol succinate ER (TOPROL-XL) 25 MG 24 hr tablet Take 1 tablet (25 mg) by mouth daily 30 tablet 0     multivitamin w/minerals (THERA-VIT-M) tablet Take 1 tablet by mouth daily 30 tablet 0     omeprazole (PRILOSEC) 20 MG DR capsule Take 1 capsule (20 mg) by mouth daily 30 capsule 0     oxyCODONE-acetaminophen (PERCOCET) 5-325 MG per tablet 1-2 tabs every 6 hours as needed, ok to dispense on 11/14/18 and start 11/16/18 180 tablet 0     pramipexole (MIRAPEX) 0.125 MG tablet Take 0.125 mg by mouth At Bedtime       rOPINIRole (REQUIP) 0.25 MG tablet Take 3 tablets (0.75 mg) by mouth At Bedtime 270 tablet 0     SUMAtriptan (IMITREX) 50 MG tablet Take 1 tablet (50 mg) by mouth at onset of headache May repeat in 2 hours if needed: max 2/day; average number of headaches monthly 4; Per [facility], this is a current medication 30 tablet 0     topiramate (TOPAMAX) 25 MG tablet Take 3 tablets (75 mg) by mouth 3 times daily 270 tablet 0     traZODone (DESYREL) 50 MG tablet Take 1 tablet (50 mg) by mouth nightly as needed for sleep 30 tablet 0       Medical History: any changes in medical history since they were last seen? Yes  Was treated for depression inpatient.  Review of Systems:  The 14 system ROS was reviewed from the intake questionnaire, and is positive for: headache, cough, wheezing, shortness of breath, hypertension, back pain, neck pain, paresthesias  Any bowel or bladder problems: frequency, urgency, incontinence.  Mood: depression, anxiety, stress    Physical Exam:  Blood pressure 129/84, pulse 112, weight 79.4 kg (175 lb), SpO2 96 %.  General: NAD,  pleasant  Gait: Unsteady, uses powered mobility.  MSK exam: widespread myofascial tenderness. Lumbar paraspinal tenderness bilaterally.     Assessment:   Ms. Tijerina is a 63 year old with past medical history including DM, HTN, anxiety depression and chronic pain who presents for evaluation and treatment of chronic low back pain.     1. Chronic low back and leg pain: History of several lumbar surgeries with ongoing pain. She has had spinal cord stimulators in the past with good relief but they were removed due to discomfort from the battery. Her last lumbar surgery was a decompression and fusion in 2009, no spinal cord stimulator after this. She has had persisting pain since the 2009 surgery which is consistent with lumbar spondylosis and radiculopathy. She also has arachnoiditis seen on prior MRI/CT scan which is also likely contributing to her symptoms. She had epidurals as recently as 4 years ago with good benefit.      2. Chronic bilateral hip pain:  The pain is likely due to gluteal muscle dysfunction/greater trochanter bursitis and IT band syndrome. Bilateral greater trochanter bursa injections performed in clinic on 10/23/18 with ongoing benefit.     3. Fibromyalgia: Meets ACR criteria for fibromyalgia. Has tried many medications in the past and is currently on gabapentin 800mg with benefit. Was started on duloxetine which was increased to 60mg daily, notes a lot of benefit for her chronic pain with this medication.      4. Chronic opiate use: Has been on percocet for many years now. States that she has functional benefit in ADLs and mobility and uses this less than prescribed. Discussed goals of the chronic pain clinic include trying to wean opioids as pain is better managed with other treatments and modalities and she was agreeable to this. In addition she received a prescription for carisoprodol recently, she states that she hasn't picked it up due to cost issues. Discussed that I strongly recommend that  she avoid starting this medication due to side effects and potential addiction issues.     Mental Health - the patient's mental health concerns, specifically stress, anxiety and depression, affect her experience of pain and contribute to her clinically significant distress.           Plan:  The following recommendations were given to the patient. Diagnosis, treatment options, risks, benefits, and alternatives were discussed, and all questions were answered. The patient expressed understanding of the plan for management.      I am recommending a multidisciplinary treatment plan to help this patient better manage her pain.  This includes:      1. Physical Therapy: Chronic pain PT referral placed.  2. Clinical Health Pain Psychologist: Would certainly benefit from biofeed back/coping strategies. Recently started working with a psychologist on stress management. Will place referral to pain phd once she has completed this.  3. Self Care Recommendations: Gentle progressive exercise that does not increase pain - gradually increase daily walking program.  Take mini breaks - 5 minutes of mindfullness a couple times a day.   4. Diagnostic Studies: none at this time  5. Medication Management:   1. Oxycodone 5/325 every 6 hours prn - Currently reports taking 2-3 per day. She filled her last prescription on 12/6 which was for 180 tablets. Will decrease this at the next visit.  2. Recommended against using carisoprodol, discussed that I would not prescribe this medication in the future.  3. Continue duloxetine 60mg daily, consider increasing this in the future.  4. Continue Gabapentin 800mg TID  5. Topamax 75mg TID  6. Sumatriptan 50mg prn  6. Further procedures recommended:   1. Bilateral greater trochanter bursa injections performed in clinic on 10/23/18, can repeat this in the future if needed.  2. Caudal epidural steroid injection ordered.  7. Referrals: none  8. Follow up: 4 weeks in clinic        I spent 40 minutes of time  face to face with the patient.  Greater than 50% of this time was spent in patient counseling and/or coordination of care regarding principles of multidisciplinary care, medication management, and treatment options as discussed above.           Maximiliano Sanchez DO  [facility]  12/17/2018

## 2018-12-17 ENCOUNTER — OFFICE VISIT (OUTPATIENT)
Dept: PALLIATIVE MEDICINE | Facility: CLINIC | Age: 63
End: 2018-12-17
Payer: COMMERCIAL

## 2018-12-17 VITALS
WEIGHT: 175 LBS | OXYGEN SATURATION: 96 % | DIASTOLIC BLOOD PRESSURE: 84 MMHG | HEART RATE: 112 BPM | BODY MASS INDEX: 32.01 KG/M2 | SYSTOLIC BLOOD PRESSURE: 129 MMHG

## 2018-12-17 DIAGNOSIS — M54.16 LUMBAR RADICULOPATHY: ICD-10-CM

## 2018-12-17 DIAGNOSIS — M79.7 FIBROMYALGIA: Primary | ICD-10-CM

## 2018-12-17 PROCEDURE — 99214 OFFICE O/P EST MOD 30 MIN: CPT | Performed by: PHYSICAL MEDICINE & REHABILITATION

## 2018-12-17 ASSESSMENT — PAIN SCALES - GENERAL: PAINLEVEL: SEVERE PAIN (7)

## 2018-12-17 NOTE — Clinical Note
Hi Dr. Chau,I'm working towards managing Lilliana' pain better, as you can imagine with her history it'll be quite challenging. I'm hopeful that I can wean her down to percocet 5/325 2-3 times daily and maybe even lower. Once we're at a more stable dose I would discharge her back to your care.Thanks for the consult,[facility]

## 2018-12-17 NOTE — PATIENT INSTRUCTIONS
1. I ordered a lumbar epidural injection, schedule this at your convenience.  2. I do not recommend taking carisoprodol.    3. Continue trying to wean the percocet, I'm encouraged that you are having days when you only use this medication twice.    4. Follow up in clinic in 1 month.    ----------------------------------------------------------------  Clinic Number:  [phone]   Call this number with any questions about your care and for scheduling assistance. Calls are returned Monday through Friday between 8 AM and 4:30 PM. We usually get back to you within 2 business days depending on the issue/request.       Medication refills:    For non-narcotic medications, call your pharmacy directly to request a refill. The pharmacy will contact the [facility] for authorization. Please allow 3-4 days for these refills to be processed.     For narcotic refills, call the clinic number or send a [employer] message. Please contact us 7-10 days before your refill is due. The message MUST include the name of the specific medication(s) requested and how you would like to receive the prescription(s). The options are as follows:    Pain Clinic staff can mail the prescription to your pharmacy. Please tell us the name of the pharmacy.    You may pick the prescription up at the Pain Clinic (tell us the location) or during a clinic visit with your pain provider    Pain Clinic staff can deliver the prescription to the [place] pharmacy in the clinic building. Please tell us the location.      We believe regular attendance is key to your success in our program.    Any time you are unable to keep your appointment we ask that you call us at least 24 hours in advance to let us know. This will allow us to offer the appointment time to another patient.

## 2018-12-18 ENCOUNTER — TELEPHONE (OUTPATIENT)
Dept: PALLIATIVE MEDICINE | Facility: CLINIC | Age: 63
End: 2018-12-18

## 2018-12-18 NOTE — TELEPHONE ENCOUNTER
Pre-screening Questions for Radiology Injections:    Injection to be done at which interventional clinic site? [facility]    Instruct patient to arrive as directed prior to the scheduled appointment time:    [place]: 30 minutes before      Procedure ordered by Daniel    Procedure ordered? Caudal Epidural Steroid Injection    What insurance would patient like us to bill for this procedure? BC      Worker's comp or MVA (motor vehicle accident) -Any injection DO NOT SCHEDULE and route to Crys Wick.      [employer] - For SI joint injections, DO NOT SCHEDULE and route Crys Wick. [employer] FREEDOM NO PA REQUIRED EFFECTIVE 11/1/2017      [employer]- MBB's must be scheduled at LEAST two weeks apart      [employer] - Any injection besides hip/shoulder/knee joint DO NOT SCHEDULE and route to Crys Wick. She will obtain PA and call pt back to schedule procedure or notify pt of denial.       [employer]-Route to Crys for review    Any chance of pregnancy? NO   If YES, do NOT schedule and route to RN pool    Is an  needed? No     Patient has a drive home? (mandatory) YES: ok    Is patient taking any blood thinners (plavix, coumadin, jantoven, warfarin, heparin, pradaxa or dabigatran )? No   If hold needed, do NOT schedule, route to RN pool     Is patient taking any aspirin products (includes Excedrin and Fiorinal)? No     If more than 325mg/day do NOT schedule; route to RN pool     For CERVICAL procedures, hold all aspirin products for 6 days.     Tell pt that if aspirin product is not held for 6 days, the procedure WILL BE cancelled.      Does the patient have a bleeding or clotting disorder? No     If YES, okay to schedule AND route to RN nurse pool    For any patients with platelet count <100, must be forwarded to provider    Is patient diabetic?  No  If YES, have them bring their glucometer.    Does patient have an active infection or treated for one within the  past week? No     Is patient currently taking any antibiotics?  No     For patients on chronic, preventative, or prophylactic antibiotics, procedures may be scheduled.     For patients on antibiotics for active or recent infection:    Guillermo Evans Burton, Snitzer-antibiotic course must have been completed for 4 days    Is patient currently taking any steroid medications? (i.e. Prednisone, Medrol)  No     For patients on steroid medications:    Guillermo Evans Burton, [place]itzer-steroid course must have been completed for 4 days    Reviewed with patient:  If you are started on any steroids or antibiotics between now and your appointment, you must contact us because the procedure may need to be cancelled.  Yes    Is patient actively being treated for cancer or immunocompromised? No  If YES, do NOT schedule and route to RN pool     Are you able to get on and off an exam table with minimal or no assistance? No  If NO, do NOT schedule and route to RN pool    Are you able to roll over and lay on your stomach with minimal or no assistance? Yes  If NO, do NOT schedule and route to RN pool     Any allergies to contrast dye, iodine, shellfish, or numbing and steroid medications? No -   If YES, route to RN pool AND add allergy information to appointment notes    Allergies: Patient has no known allergies.      Has the patient had a flu shot or any other vaccinations within 7 days before or after the procedure.  No     Does patient have an MRI/CT?  YES: MRI  (SI joint, hip injections, lumbar sympathetic blocks, and stellate ganglion blocks do not require an MRI)    Was the MRI done w/in the last 3 years?  Yes    Was MRI done at [place]? No      If not, where was it done? [facility]       If MRI was not done at [place], [facility] or [facility] do NOT schedule and route to nursing.  If pt has an imaging disc, the injection may be scheduled but pt has to bring disc to appt. If they show up w/out disc the injection  cannot be done    Reminders (please tell patient if applicable):       Instructed pt to arrive 30 minutes early for IV start if this is for a cervical procedure, ALL sympathetic (stellate ganglion, hypogastric, or lumbar sympathetic block) and all sedation procedures (RFA, spinal cord stimulation trials).  Not Applicable   -IVs are not routinely placed for Dr. Gibbs cervical cases   -Dr. Caballero: IVs for cervical ESIs and cervical TBDs (not CMBBs/facet inj)      If NPO for sedation, informed patient that it is okay to take medications with sips of water (except if they are to hold blood thinners).  Not Applicable   *DO take blood pressure medication if it is prescribed*      If this is for a cervical MARY, informed patient that aspirin needs to be held for 6 days.   Not Applicable      For all patients not having spinal cord stimulator (SCS) trials or radiofrequency ablations (RFAs), informed patient:    IV sedation is not provided for this procedure.  If you feel that an oral anti-anxiety medication is needed, you can discuss this further with your referring provider or primary care provider.  The Pain Clinic provider will discuss specifics of what the procedure includes at your appointment.  Most procedures last 10-20 minutes.  We use numbing medications to help with any discomfort during the procedure.  Not Applicable      Do not schedule procedures requiring IV placement in the first appointment of the day or first appointment after lunch. Do NOT schedule at 0745, 0815 or 1245.       For patients 85 or older we recommend having an adult stay w/ them for the remainder of the day.       Does the patient have any questions?  NO  Jacque Sher  [facility]

## 2018-12-19 ENCOUNTER — HOSPITAL ENCOUNTER (OUTPATIENT)
Facility: CLINIC | Age: 63
Discharge: HOME | End: 2018-12-19
Attending: PHYSICAL MEDICINE & REHABILITATION | Admitting: PHYSICAL MEDICINE & REHABILITATION
Payer: COMMERCIAL

## 2018-12-19 ENCOUNTER — HOSPITAL ENCOUNTER (OUTPATIENT)
Dept: GENERAL RADIOLOGY | Facility: CLINIC | Age: 63
Discharge: HOME | End: 2018-12-19
Attending: PHYSICAL MEDICINE & REHABILITATION | Admitting: PHYSICAL MEDICINE & REHABILITATION
Payer: COMMERCIAL

## 2018-12-19 ENCOUNTER — RADIOLOGY INJECTION OFFICE VISIT (OUTPATIENT)
Dept: PALLIATIVE MEDICINE | Facility: CLINIC | Age: 63
End: 2018-12-19
Attending: PHYSICAL MEDICINE & REHABILITATION
Payer: COMMERCIAL

## 2018-12-19 VITALS
OXYGEN SATURATION: 100 % | HEART RATE: 88 BPM | SYSTOLIC BLOOD PRESSURE: 144 MMHG | DIASTOLIC BLOOD PRESSURE: 75 MMHG | RESPIRATION RATE: 18 BRPM

## 2018-12-19 DIAGNOSIS — M54.16 LUMBAR RADICULOPATHY: ICD-10-CM

## 2018-12-19 DIAGNOSIS — M54.16 LUMBAR RADICULOPATHY: Primary | ICD-10-CM

## 2018-12-19 PROCEDURE — 62323 NJX INTERLAMINAR LMBR/SAC: CPT | Mod: TC

## 2018-12-19 PROCEDURE — 40000863 ZZH STATISTIC RADIOLOGY XRAY, US, CT, MAR, NM

## 2018-12-19 PROCEDURE — 62323 NJX INTERLAMINAR LMBR/SAC: CPT | Performed by: PHYSICAL MEDICINE & REHABILITATION

## 2018-12-19 NOTE — DISCHARGE INSTRUCTIONS
[facility] Procedure Discharge Instructions    Today you saw:  Dr Maximiliano Sanchez    Procedure:  Epidural Steroid Injection : Lumbar     Medications used:  1% Lidocaine  -  Kenalog -   Isovue M200  - Normal saline    After you go home:      You may resume your normal diet    It is recommended that a responsible adult stay with you for 6 hours if you received sedation       If you received sedation before, during or after your procedure:      For 24 hours -     Relax and take it easy    Do NOT make any important or legal decisions    Do NOT drive or operate machines at home or at work    Do NOT drink alcohol    Care of Puncture Site:      If you have a bandaid on your puncture site, you may remove it the next morning    You may shower     No bath tubs, whirlpools or swimming for 24 hours after your procedure     Activity:      You may go back to normal activity in 24 hours    Avoid strenuous activity for the first 24 hours.    Be cautious when walking. Numbness and/or weakness in the lower extremities may occur for up to 6-8 hours after the procedure due to effect of the numbing medication used.    Do not drive for 6 hours.  The effect of the numbing medication could slow your reflexes.    Medicines:      You may resume all medications    Resume your Warfarin/Coumadin at your regular dose tonight. Follow up with your provider to have your INR rechecked    Resume your Plavix/Clopidogrel and Aspirin in 12 hours    If you are taking a different blood thinner, follow the directions provided by the Pain Clinic RN for restarting the medication    For minor pain, you may use anti-inflammatory medications - (such as Ibuprofen, Aleve, Advil) or Acetaminophen (Tylenol) for pain control if necessary.    Pain:       You may have a mild to moderate increase in pain for several days following the injection.    It may take up to 14 days for the steroid medication to start working although you may feel the  effect as early as a few days after the procedure.    You may use ice packs for 10-15 minutes, 3 to 4 times a day at the injection site for comfort.    Do not use heat to painful areas for 6 to 8 hours.  This will give the numbing medication time to wear off and prevent you from accidentally burning your skin.    Call the Pain Clinic if you experience any of the following:      You have chills or a fever greater than 100 F     Swelling, bleeding, redness, drainage, warmth at the injection site    Progressive weakness or numbness in your legs or arms    If lumbar, call if you have a loss of bowel or bladder function    If cervical, call if you have any unusual headache that is not relieved by Tylenol or other pain medication    Unusual new onset of pain that is not improving    Other Instructions:      If you are diabetic, check your blood sugar more frequently than usual as your blood sugar may be higher than normal for 10-14 days following a steroid injection.  Contact the provider who manages your diabetes to help you control your blood sugar if needed.      If you have questions call:        Pain Clinic @ [phone] during business hours  Monday-Thursday 8 AM-5:30 PM and Friday 8 AM-4:30 PM    Provider Line @ [phone] after hours

## 2018-12-19 NOTE — PROGRESS NOTES
[facility] - Procedure Note    Date of Service: 12/19/2018    Procedure performed: caudal epidural steroid injection with fluoroscopic guidance  Diagnosis: Lumbar spondylosis; Lumbar radiculitis/radiculopathy  : Maximiliano Sanchez DO   Anesthesia: none    Indications: Lilliana Cardenas is a 63 year old female who is seen at the request of myself for a caudal epidural steroid injection. The patient describes chronic low back and bilateral lower extremity pain. The patient has been exhibiting symptoms consistent with lumbar intraspinal inflammation and radiculopathy. Symptoms have been persistent, disabling, and intermittently severe. The patient reports minimal improvement with conservative treatment, including oral medications and PT. She has had caudal epidurals in the past with significant pain relief for 3+ months..    MRI Lumbar spine 11/9/2016  EXAM: MR LUMBAR SPINE WITHOUT CONTRAST     CLINICAL INFORMATION: Low back pain syndrome with thoracolumbar neuritis. History of falling injuries.     COMPARISON: CT lumbar 3/18/2014.     TECHNICAL INFORMATION: Sagittal and axial T1 and fast spin echo T2, sagittal STIR and coronal T2 images were obtained through the lumbar spine. The patient received 5 mL of oral liquid Valium. The patient's O2 saturation and heart rate were monitored throughout the procedure by pulse oximeter, and values remained within normal ranges.     INTERPRETATION:     Osseous Structures:     Fat suppressed images are negative for acute or subacute fractures. Active endplate discogenic marrow changes at L2-3.     Paraspinous Soft Tissues:     No mass lesions. Left renal cyst.     Conus, Cord and Cauda Equina:     Normal position conus and no evidence of intradural mass. High signal cord alteration is noted at T10-11 and T11-12. Chronic adhesive arachnoiditis changes at L4-5 and L5-S1.     L4-5 and L5-S1: Solid interbody and dorsal fusion without recurrent stenosis.  Chronic arachnoidal adhesions.     L3-4: Mild disc desiccation, fused facet joints and no spinal stenosis or neural impingement.     L2-3: Gaseous disc degeneration with extensive endplate discogenic marrow changes. No fluid in disc space to suggest infection although chronic infection should be excluded on clinical grounds. Hypertrophic facet degeneration with mild active left facet joint arthropathy. Mild to moderate central stenosis. Mild right and moderate left foraminal stenosis.     L1-2: Severe disc space narrowing with moderate fluid in the disc space likely degenerative in nature with some underlying marrow edema especially into L2 vertebral body. Infection is felt to be less likely but should be excluded on clinical grounds. Mild chronic superior compression deformity of T12.     Moderate to severe central stenosis is present with ventral cord/conus contact by osteophyte and bulging disc. Foraminal stenosis is severe on the right and mild to moderate on the left.     T12-L1: Moderate disc degeneration, bulge with hypertrophic facet arthropathy and moderate central spinal stenosis. Foramina appear patent.     T11-12: Hypertrophic facet degeneration with previous decompression, residual mild central stenosis but no cord compression. High signal cord alteration is consistent with myelomalacia and atrophy.     T10-11: Moderate disc degeneration with mild central stenosis and bulge abuts the cord. Mild high signal cord alteration suspected at extreme superior margin of sagittal views.        CONCLUSION: Multilevel lower thoracic and lumbar disc degeneration with significant findings as follows:     1. Moderate to severe degenerative central stenosis at L1-2 with impingement on distal cord/conus. Severe right and mild to moderate left foraminal stenosis.     2. Moderate T12-L1 central spinal stenosis.     3. High signal cord alteration at T11-12 status post decompression with mild residual central stenosis. Cord  compression and central stenosis at T10-11 with faint high signal cord change.     4. Extensive endplate marrow edema and degeneration at L2-3 most likely degenerative in nature, with mild to moderate central stenosis and severe left foraminal stenosis.     5. Endplate discogenic marrow changes at L2-3 and to a lesser degree L1-2 are likely degenerative in nature although chronic infection can sometimes give this appearance and clinical/laboratory correlation is suggested as clinically indicated.     6. Solid AP fusion from L4 to the sacrum with chronic adhesive arachnoiditis.     NOTE: Comparison with CT lumbar dated 3/18/2014 shows no change in L5 and L4 fusion. Significant interval progression of L2-3 disc degeneration and no change in severe gaseous disc degeneration at L1-2. Superior L1 compression fracture has healed.     WJM:tb     IMPRESSION:  1. Study at least moderately degraded by motion artifact. Foramina in particular are not optimally seen.  2. Degenerative changes seen involving the cervical spine most pronounced at C4-C5 through C6-C7 with multilevel mild to moderate central stenosis.  3. Study is incomplete. Postcontrast sagittal images were not obtained.  4. Nonspecific T2 hyperintensity within the central marlena which may be ischemic in etiology.           Allergies:    No Known Allergies     Vitals:  CS Vitals/Pain     Row Name  12/19/18  1545  12/19/18  1452         Vitals   Pulse  88  91         BP  144/75  149/70         BP Location  Right arm  Left arm         Resp  18  --         Oxygen Therapy   SpO2  100 %  100 %         O2 Device  None (Room air)  None (Room air)             Review of Systems: The patient denies recent fever, chills, illness, use of antibiotics or anticoagulants. All other 10-point review of systems negative.       Procedure: The procedure and risks were explained, and informed written consent was obtained from the patient. Risks include but are not limited to: infection,  bleeding, increased pain, and damage to soft tissue, nerve, muscle, and vasculature structures. After getting informed consent, patient was brought into the procedure suite and was placed in a prone position on the procedure table. A Pause for the Cause was performed. Patient was prepped and draped in sterile fashion.     The sacral hiatus and cornua were palpated.  Under lateral fluoroscopic guidance sacral hiatus was identified.  A total of 3mL of Lidocaine 1%  used to anesthetize the skin and the needle track at a skin entry site.  A 22 gauge 5 inch spinal needle was advanced through the sacral hiatus using intermittent fluoroscopy. The needle angle was decreased to allow entrance into the sacral canal and epidural space.  This was verified in both the AP and lateral view for correct alignment.       The position was then inspected from anteroposterior and lateral views, and the needle adjusted appropriately.  After negative aspiration for heme and CSF, a total of 1 mL of Isovue-200 was injected using static and continuous fluoroscopy confirming appropriate position, into the epidural space, with no intravascular or intrathecal uptake. 9 mL of Isovue-200 was wasted.    1 mL of 1% lidocaine, 3 mL of preservative free saline, with 40 mg of triamcinolone was injected.  The needle was removed. Hemostasis was achieved, the area was cleaned, and bandaids were placed when appropriate. Images were saved to PACS.    The patient tolerated the procedure well, and was taken to the recovery room, and there was no evidence of procedural complications. No new sensory or motor deficits were noted following the procedure. The patient was stable and able to ambulate on discharge home. Post-procedure instructions were provided.     Pre-procedure pain score: 7/10 in the back, 8/10 in the leg  Post-procedure pain score: 4/10 in the back, 4/10 in the leg    Assessment/Plan: Lilliana Cardenas is a 63 year old female s/p caudal  epidural steroid injection today for lumbar spondylosis, radiculitis/radiculopathy.     1. Following today's procedure, the patient was advised to contact the [facility] for any of the following:   Fever, chills, or night sweats   New onset of pain, numbness, or weakness   Any questions/concerns regarding the procedure  If unable to contact the Pain Center, the patient was instructed to go to a local Emergency Room for any complications.   2. The patient will receive a follow-up call in 1 week.  3. The patient should follow-up with the referring provider in 2 weeks for post-procedure evaluation.    Maximiliano Sanchez,   [facility]

## 2018-12-19 NOTE — IP AVS SNAPSHOT
[place] [address] [place] [address]  Phone:  [phone]                                    After Visit Summary   12/19/2018    Lilliana Cardenas    MRN: [id number]           After Visit Summary Signature Page    I have received my discharge instructions, and my questions have been answered. I have discussed any challenges I see with this plan with the nurse or doctor.    ..........................................................................................................................................  Patient/Patient Representative Signature      ..........................................................................................................................................  Patient Representative Print Name and Relationship to Patient    ..................................................               ................................................  Date                                   Time    ..........................................................................................................................................  Reviewed by Signature/Title    ...................................................              ..............................................  Date                                               Time          22EPIC Rev 08/18

## 2018-12-19 NOTE — BRIEF OP NOTE
[facility] Brief Operative Note  RADIOLOGY POST PROCEDURE NOTE    Patient name: Lilliana Cardenas  MRN: [id number]  : 1955    Pre-procedure diagnosis: lumbosacral radiculopathy  Post-procedure diagnosis: Same    Procedure Date/Time: 2018  3:12 PM  Procedure: caudal epidural steroid injection  Estimated blood loss: None  Specimen(s) collected with description: none    The patient tolerated the procedure well with no immediate complications.  Significant findings:none    See imaging dictation for procedural details.    Provider name: Maximiliano Sanchez  Assistant(s):None

## 2018-12-19 NOTE — PROGRESS NOTES
VS stable post procedure, rate her pain 4-5/10, CMS remains intact, discharge instruction is given to pt.  All questions are answered.  Pt is ready to be discharged. Transportation was arranged by pt.

## 2018-12-19 NOTE — IP AVS SNAPSHOT
MRN:[id number]                      After Visit Summary   12/19/2018    Lilliana Cardenas    MRN: [id number]           Visit Information        Department      12/19/2018  2:34 PM [facility]          Review of your medicines      UNREVIEWED medicines. Ask your doctor about these medicines       Dose / Directions   albuterol 108 (90 Base) MCG/ACT inhaler  Commonly known as:  PROAIR HFA/PROVENTIL HFA/VENTOLIN HFA  Used for:  Mild intermittent asthma without complication      Dose:  2 puff  Inhale 2 puffs into the lungs 4 times daily as needed for other (dyspnea)  Quantity:  1 Inhaler  Refills:  1     Carisoprodol 250 MG Tabs  Used for:  Chronic pain syndrome      Dose:  250 mg  Take 250 mg by mouth 4 times daily as needed  Quantity:  16 tablet  Refills:  0     DULoxetine 60 MG capsule  Commonly known as:  CYMBALTA  Used for:  Depression with anxiety      Dose:  60 mg  Take 1 capsule (60 mg) by mouth daily  Quantity:  30 capsule  Refills:  1     fluticasone 50 MCG/ACT nasal spray  Commonly known as:  FLONASE  Used for:  Dry nose      Dose:  2 spray  Spray 2 sprays into both nostrils daily  Quantity:  1 Bottle  Refills:  0     gabapentin 800 MG tablet  Commonly known as:  NEURONTIN  Used for:  Depression with anxiety, Chronic pain syndrome, Chronic low back pain, unspecified back pain laterality, with sciatica presence unspecified      Dose:  800 mg  Take 1 tablet (800 mg) by mouth 3 times daily  Quantity:  90 tablet  Refills:  0     hydrOXYzine 25 MG tablet  Commonly known as:  ATARAX  Used for:  Moderate major depression (H)      Dose:  25 mg  Take 1 tablet (25 mg) by mouth 4 times daily as needed for itching  Quantity:  120 tablet  Refills:  1     ketoprofen 10% in PLO 10% topical gel  Used for:  Arthritis      Dose:  2-4 g  Place 2-4 g onto the skin every 4 hours as needed for moderate pain  Quantity:  200 g  Refills:  1     lisinopril 5 MG tablet  Commonly known as:   PRINIVIL/ZESTRIL  Used for:  Benign essential hypertension      Dose:  5 mg  Take 1 tablet (5 mg) by mouth daily  Quantity:  30 tablet  Refills:  0     metoprolol succinate ER 25 MG 24 hr tablet  Commonly known as:  TOPROL-XL  Used for:  Benign essential hypertension      Dose:  25 mg  Take 1 tablet (25 mg) by mouth daily  Quantity:  30 tablet  Refills:  0     multivitamin w/minerals tablet  Used for:  Vitamin deficiency      Dose:  1 tablet  Take 1 tablet by mouth daily  Quantity:  30 tablet  Refills:  0     omeprazole 20 MG DR capsule  Commonly known as:  priLOSEC  Used for:  Gastroesophageal reflux disease without esophagitis      Dose:  20 mg  Take 1 capsule (20 mg) by mouth daily  Quantity:  30 capsule  Refills:  0     oxyCODONE-acetaminophen 5-325 MG tablet  Commonly known as:  PERCOCET  Used for:  Chronic pain syndrome      1-2 tabs every 6 hours as needed, ok to dispense on 11/14/18 and start 11/16/18  Quantity:  180 tablet  Refills:  0     pramipexole 0.125 MG tablet  Commonly known as:  MIRAPEX      Dose:  0.125 mg  Take 0.125 mg by mouth At Bedtime  Refills:  0     rOPINIRole 0.25 MG tablet  Commonly known as:  REQUIP  Used for:  Chronic pain syndrome      Dose:  0.75 mg  Take 3 tablets (0.75 mg) by mouth At Bedtime  Quantity:  270 tablet  Refills:  0     SUMAtriptan 50 MG tablet  Commonly known as:  IMITREX  Used for:  Chronic pain syndrome      Dose:  50 mg  Take 1 tablet (50 mg) by mouth at onset of headache May repeat in 2 hours if needed: max 2/day; average number of headaches monthly 4; Per [facility], this is a current medication  Quantity:  30 tablet  Refills:  0     topiramate 25 MG tablet  Commonly known as:  TOPAMAX  Used for:  Migraine without status migrainosus, not intractable, unspecified migraine type, Chronic pain syndrome      Dose:  75 mg  Take 3 tablets (75 mg) by mouth 3 times daily  Quantity:  270 tablet  Refills:  0     traZODone 50 MG tablet  Commonly known  as:  DESYREL  Used for:  Psychophysiological insomnia      Dose:  50 mg  Take 1 tablet (50 mg) by mouth nightly as needed for sleep  Quantity:  30 tablet  Refills:  0              Protect others around you: Learn how to safely use, store and throw away your medicines at www.disposemymeds.org.       Follow-ups after your visit       Your next 10 appointments already scheduled    Jan 07, 2019  2:30 PM CST  New Visit with Miriam Head PT  [facility] ([facility]) [address] [place], [place] [address]  [facility] [address]  [facility] [address] [phone]   Jan 17, 2019  2:00 PM CST  Return Visit with Maximiliano Sanchez MD  [facility] ([facility]) [address] [facility]  [address]  [facility] [address]  [phone]      Care Instructions       Further instructions from your care team       [facility] Procedure Discharge Instructions    Today you saw:  Dr Maximiliano Sanchez    Procedure:  Epidural Steroid Injection : Lumbar     Medications used:  1% Lidocaine  -  Kenalog -   Isovue M200  - Normal saline    After you go home:      You may resume your normal diet    It is recommended that a responsible adult stay with you for 6 hours if you received sedation       If you received sedation before, during or after your procedure:      For 24 hours -     Relax and take it easy    Do NOT make any important or legal decisions    Do NOT drive or operate machines at home or at work    Do NOT drink alcohol    Care of Puncture Site:      If you have a bandaid on your puncture site, you may remove it the next morning    You may shower     No bath tubs, whirlpools or swimming for 24 hours after your procedure     Activity:      You may go back to normal activity in 24 hours    Avoid strenuous activity for the first 24 hours.    Be cautious when walking. Numbness and/or weakness in the lower extremities may occur for up to 6-8 hours after the procedure due  to effect of the numbing medication used.    Do not drive for 6 hours.  The effect of the numbing medication could slow your reflexes.    Medicines:      You may resume all medications    Resume your Warfarin/Coumadin at your regular dose tonight. Follow up with your provider to have your INR rechecked    Resume your Plavix/Clopidogrel and Aspirin in 12 hours    If you are taking a different blood thinner, follow the directions provided by the Pain Clinic RN for restarting the medication    For minor pain, you may use anti-inflammatory medications - (such as Ibuprofen, Aleve, Advil) or Acetaminophen (Tylenol) for pain control if necessary.    Pain:       You may have a mild to moderate increase in pain for several days following the injection.    It may take up to 14 days for the steroid medication to start working although you may feel the effect as early as a few days after the procedure.    You may use ice packs for 10-15 minutes, 3 to 4 times a day at the injection site for comfort.    Do not use heat to painful areas for 6 to 8 hours.  This will give the numbing medication time to wear off and prevent you from accidentally burning your skin.    Call the Pain Clinic if you experience any of the following:      You have chills or a fever greater than 100 F     Swelling, bleeding, redness, drainage, warmth at the injection site    Progressive weakness or numbness in your legs or arms    If lumbar, call if you have a loss of bowel or bladder function    If cervical, call if you have any unusual headache that is not relieved by Tylenol or other pain medication    Unusual new onset of pain that is not improving    Other Instructions:      If you are diabetic, check your blood sugar more frequently than usual as your blood sugar may be higher than normal for 10-14 days following a steroid injection.  Contact the provider who manages your diabetes to help you control your blood sugar if needed.      If you have  questions call:        Pain Clinic @ [phone] during business hours  Monday-Thursday 8 AM-5:30 PM and Friday 8 AM-4:30 PM    Provider Line @ [phone] after hours          Additional Information About Your Visit       Care EveryWhere ID    This is your Care EveryWhere ID. This could be used by other organizations to access your [facility] medical records  [id number]       Your Vitals Were     Blood Pressure   149/70 (BP Location: Left arm)    Pulse   91    Pulse Oximetry   100%            Primary Care Provider Office Phone # Fax #    Bibi Natanael Chau, [phone]      Equal Access to Services    [facility]: Hadii aad gila hadasho Soomaali, waaxda luqadaha, qaybta kaalmada adeegyada, lina wu . So [facility] [phone].    ATENCIÓN: Si habla español, tiene a lai disposición servicios gratuitos de asistencia lingüística. Llame al [phone].    We comply with applicable federal civil rights laws and [place] laws. We do not discriminate on the basis of race, color, national origin, age, disability, sex, sexual orientation, or gender identity.           Thank you!    Thank you for choosing [facility] for your care. Our goal is always to provide you with excellent care. Hearing back from our patients is one way we can continue to improve our services. Please take a few minutes to complete the written survey that you may receive in the mail after you visit with us. Thank you!            Medication List      ASK your doctor about these medications          Morning Afternoon Evening Bedtime As Needed    albuterol 108 (90 Base) MCG/ACT inhaler  Commonly known as:  PROAIR HFA/PROVENTIL HFA/VENTOLIN HFA  Inhale 2 puffs into the lungs 4 times daily as needed for other (dyspnea)                     Carisoprodol 250 MG Tabs  Take 250 mg by mouth 4 times daily as needed                     DULoxetine 60 MG capsule  Commonly known as:  CYMBALTA  Take 1 capsule (60 mg) by  mouth daily                     fluticasone 50 MCG/ACT nasal spray  Commonly known as:  FLONASE  Spray 2 sprays into both nostrils daily                     gabapentin 800 MG tablet  Commonly known as:  NEURONTIN  Take 1 tablet (800 mg) by mouth 3 times daily                     hydrOXYzine 25 MG tablet  Commonly known as:  ATARAX  Take 1 tablet (25 mg) by mouth 4 times daily as needed for itching                     ketoprofen 10% in PLO 10% topical gel  Place 2-4 g onto the skin every 4 hours as needed for moderate pain                     lisinopril 5 MG tablet  Commonly known as:  PRINIVIL/ZESTRIL  Take 1 tablet (5 mg) by mouth daily                     metoprolol succinate ER 25 MG 24 hr tablet  Commonly known as:  TOPROL-XL  Take 1 tablet (25 mg) by mouth daily                     multivitamin w/minerals tablet  Take 1 tablet by mouth daily                     omeprazole 20 MG DR capsule  Commonly known as:  priLOSEC  Take 1 capsule (20 mg) by mouth daily                     oxyCODONE-acetaminophen 5-325 MG tablet  Commonly known as:  PERCOCET  1-2 tabs every 6 hours as needed, ok to dispense on 11/14/18 and start 11/16/18                     pramipexole 0.125 MG tablet  Commonly known as:  MIRAPEX  Take 0.125 mg by mouth At Bedtime                     rOPINIRole 0.25 MG tablet  Commonly known as:  REQUIP  Take 3 tablets (0.75 mg) by mouth At Bedtime                     SUMAtriptan 50 MG tablet  Commonly known as:  IMITREX  Take 1 tablet (50 mg) by mouth at onset of headache May repeat in 2 hours if needed: max 2/day; average number of headaches monthly 4; Per [facility], this is a current medication                     topiramate 25 MG tablet  Commonly known as:  TOPAMAX  Take 3 tablets (75 mg) by mouth 3 times daily                     traZODone 50 MG tablet  Commonly known as:  DESYREL  Take 1 tablet (50 mg) by mouth nightly as needed for sleep

## 2018-12-23 DIAGNOSIS — G89.4 CHRONIC PAIN SYNDROME: ICD-10-CM

## 2018-12-24 NOTE — TELEPHONE ENCOUNTER
"Requested Prescriptions   Pending Prescriptions Disp Refills     SUMAtriptan (IMITREX) 50 MG tablet [Pharmacy Med Name: SUMATRIPTAN SUCC 50 MG TABLET]  Last Written Prescription Date:  11/8/2018  Last Fill Quantity: 30,  # refills: 0   Last office visit: 12/10/2018 with prescribing provider:     Future Office Visit:   Next 5 appointments (look out 90 days)    Jan 17, 2019  2:00 PM CST  Return Visit with Maximiliano Sanchez MD  [facility] ([facility]) [address]  [address]  [facility] [address]  [phone]        21 tablet 0     Sig: TAKE 1 TAB AT ONSET OF MIGRAINE. MAY REPEAT AFTER 2 HRS. MAX 2 TABS/DAY    Serotonin Agonists Failed - 12/23/2018 12:57 PM       Failed - Blood pressure under 140/90 in past 12 months    BP Readings from Last 3 Encounters:   12/19/18 144/75   12/17/18 129/84   12/10/18 134/88                Failed - Serotonin Agonist request needs review.    Please review patient's record. If patient has had 8 or more treatments in the past month, please forward to provider.         Passed - Recent (12 mo) or future (30 days) visit within the authorizing provider's specialty    Patient had office visit in the last 12 months or has a visit in the next 30 days with authorizing provider or within the authorizing provider's specialty.  See \"Patient Info\" tab in inbasket, or \"Choose Columns\" in Meds & Orders section of the refill encounter.             Passed - Patient is age 18 or older       Passed - No active pregnancy on record       Passed - No positive pregnancy test in past 12 months        "

## 2018-12-26 ENCOUNTER — TELEPHONE (OUTPATIENT)
Dept: INTERNAL MEDICINE | Facility: CLINIC | Age: 63
End: 2018-12-26

## 2018-12-26 NOTE — TELEPHONE ENCOUNTER
Panel Management Review      Patient has the following on her problem list:     Depression / Dysthymia review    Measure:  Needs PHQ-9 score of 4 or less during index window.  Administer PHQ-9 and if score is 5 or more, send encounter to provider for next steps.    5 - 7 month window range: none    PHQ-9 SCORE 10/17/2018 12/10/2018   PHQ-9 Total Score 8 21       If PHQ-9 recheck is 5 or more, route to provider for next steps.    Patient is due for:  None      Composite cancer screening  Chart review shows that this patient is due/due soon for the following Pap Smear, Mammogram and Colonoscopy  Summary:    Patient is due/failing the following:   COLONOSCOPY, MAMMOGRAM and PAP    Action needed:   Patient needs office visit for Pap. and Patient needs referral/order: Mammogram and colonoscopy    Type of outreach:    Sent letter.    Questions for provider review:    None                                                                                                                                    CELI HUDSON CMA       Chart routed to Care Team .

## 2018-12-26 NOTE — LETTER
[facility] [place]  [address], [address]  [place], [place]  [address]                                            TEL:[phone]  FAX:[phone]      Lilliana GILL   [facility] [address]      December 26, 2018    Dear Lilliana,    At [facility] [place] we care about your health and well-being.  A review of your chart has indicated that you are due for a Pap,Mammogram and Colonoscopy.  Please contact us at [phone] to schedule an appointment.    If you have already had one or all of the above screening tests at another facility, please call us to update your chart.    Sincerely,      Bibi Chau C.N.P.

## 2018-12-26 NOTE — LETTER
[facility]  [address], [address]  [place], [place]  [address]                                            TEL:[phone]  FAX:[phone]      Lilliana GILL   [facility] [address]      January 10, 2019    Dear Lilliana,    We sent you a letter a couple of weeks ago informing you of health maintenance that is due. We hope that you received it. This letter is just a follow up to remind you to schedule an appointment.            Sincerely,      Bibi Chau C.N.P.

## 2018-12-27 RX ORDER — SUMATRIPTAN 50 MG/1
TABLET, FILM COATED ORAL
Qty: 21 TABLET | Refills: 0 | Status: SHIPPED | OUTPATIENT
Start: 2018-12-27 | End: 2019-03-04

## 2019-01-11 ENCOUNTER — APPOINTMENT (OUTPATIENT)
Dept: GENERAL RADIOLOGY | Facility: CLINIC | Age: 64
End: 2019-01-11
Payer: COMMERCIAL

## 2019-01-11 ENCOUNTER — HOSPITAL ENCOUNTER (EMERGENCY)
Facility: CLINIC | Age: 64
Discharge: HOME | End: 2019-01-11
Attending: INTERNAL MEDICINE | Admitting: INTERNAL MEDICINE
Payer: COMMERCIAL

## 2019-01-11 VITALS
OXYGEN SATURATION: 99 % | DIASTOLIC BLOOD PRESSURE: 100 MMHG | BODY MASS INDEX: 32.01 KG/M2 | HEIGHT: 62 IN | RESPIRATION RATE: 18 BRPM | HEART RATE: 117 BPM | TEMPERATURE: 98.6 F | SYSTOLIC BLOOD PRESSURE: 139 MMHG

## 2019-01-11 DIAGNOSIS — J45.901 EXACERBATION OF ASTHMA, UNSPECIFIED ASTHMA SEVERITY, UNSPECIFIED WHETHER PERSISTENT: ICD-10-CM

## 2019-01-11 DIAGNOSIS — G89.4 CHRONIC PAIN SYNDROME: ICD-10-CM

## 2019-01-11 DIAGNOSIS — F41.8 DEPRESSION WITH ANXIETY: ICD-10-CM

## 2019-01-11 LAB
ANION GAP SERPL CALCULATED.3IONS-SCNC: 6 MMOL/L (ref 3–14)
BASOPHILS # BLD AUTO: 0 10E9/L (ref 0–0.2)
BASOPHILS NFR BLD AUTO: 0.4 %
BUN SERPL-MCNC: 12 MG/DL (ref 7–30)
CALCIUM SERPL-MCNC: 8.6 MG/DL (ref 8.5–10.1)
CHLORIDE SERPL-SCNC: 108 MMOL/L (ref 94–109)
CO2 SERPL-SCNC: 25 MMOL/L (ref 20–32)
CREAT SERPL-MCNC: 0.77 MG/DL (ref 0.52–1.04)
DIFFERENTIAL METHOD BLD: NORMAL
EOSINOPHIL # BLD AUTO: 0.1 10E9/L (ref 0–0.7)
EOSINOPHIL NFR BLD AUTO: 1.6 %
ERYTHROCYTE [DISTWIDTH] IN BLOOD BY AUTOMATED COUNT: 12.7 % (ref 10–15)
GFR SERPL CREATININE-BSD FRML MDRD: 82 ML/MIN/{1.73_M2}
GLUCOSE SERPL-MCNC: 103 MG/DL (ref 70–99)
HCT VFR BLD AUTO: 39.8 % (ref 35–47)
HGB BLD-MCNC: 12.7 G/DL (ref 11.7–15.7)
IMM GRANULOCYTES # BLD: 0 10E9/L (ref 0–0.4)
IMM GRANULOCYTES NFR BLD: 0.2 %
INTERPRETATION ECG - MUSE: NORMAL
LYMPHOCYTES # BLD AUTO: 2.3 10E9/L (ref 0.8–5.3)
LYMPHOCYTES NFR BLD AUTO: 26 %
MCH RBC QN AUTO: 29.9 PG (ref 26.5–33)
MCHC RBC AUTO-ENTMCNC: 31.9 G/DL (ref 31.5–36.5)
MCV RBC AUTO: 94 FL (ref 78–100)
MONOCYTES # BLD AUTO: 0.5 10E9/L (ref 0–1.3)
MONOCYTES NFR BLD AUTO: 5.1 %
NEUTROPHILS # BLD AUTO: 6 10E9/L (ref 1.6–8.3)
NEUTROPHILS NFR BLD AUTO: 66.7 %
NRBC # BLD AUTO: 0 10*3/UL
NRBC BLD AUTO-RTO: 0 /100
PLATELET # BLD AUTO: 249 10E9/L (ref 150–450)
POTASSIUM SERPL-SCNC: 3.6 MMOL/L (ref 3.4–5.3)
RBC # BLD AUTO: 4.25 10E12/L (ref 3.8–5.2)
SODIUM SERPL-SCNC: 139 MMOL/L (ref 133–144)
WBC # BLD AUTO: 9 10E9/L (ref 4–11)

## 2019-01-11 PROCEDURE — 99285 EMERGENCY DEPT VISIT HI MDM: CPT | Mod: 25

## 2019-01-11 PROCEDURE — 25000125 ZZHC RX 250: Performed by: INTERNAL MEDICINE

## 2019-01-11 PROCEDURE — 80048 BASIC METABOLIC PNL TOTAL CA: CPT | Performed by: INTERNAL MEDICINE

## 2019-01-11 PROCEDURE — 85025 COMPLETE CBC W/AUTO DIFF WBC: CPT | Performed by: INTERNAL MEDICINE

## 2019-01-11 PROCEDURE — 25000125 ZZHC RX 250: Performed by: STUDENT IN AN ORGANIZED HEALTH CARE EDUCATION/TRAINING PROGRAM

## 2019-01-11 PROCEDURE — 94640 AIRWAY INHALATION TREATMENT: CPT

## 2019-01-11 PROCEDURE — 71046 X-RAY EXAM CHEST 2 VIEWS: CPT

## 2019-01-11 PROCEDURE — 93005 ELECTROCARDIOGRAM TRACING: CPT

## 2019-01-11 PROCEDURE — 36415 COLL VENOUS BLD VENIPUNCTURE: CPT | Performed by: INTERNAL MEDICINE

## 2019-01-11 RX ORDER — IPRATROPIUM BROMIDE AND ALBUTEROL SULFATE 2.5; .5 MG/3ML; MG/3ML
3 SOLUTION RESPIRATORY (INHALATION) ONCE
Status: COMPLETED | OUTPATIENT
Start: 2019-01-11 | End: 2019-01-11

## 2019-01-11 RX ORDER — DOXYCYCLINE 100 MG/1
100 CAPSULE ORAL 2 TIMES DAILY
Qty: 14 CAPSULE | Refills: 0 | Status: SHIPPED | OUTPATIENT
Start: 2019-01-11 | End: 2019-02-14

## 2019-01-11 RX ORDER — IPRATROPIUM BROMIDE AND ALBUTEROL SULFATE 2.5; .5 MG/3ML; MG/3ML
3 SOLUTION RESPIRATORY (INHALATION)
Status: DISPENSED | OUTPATIENT
Start: 2019-01-11 | End: 2019-01-11

## 2019-01-11 RX ORDER — DOXYCYCLINE 100 MG/1
100 CAPSULE ORAL 2 TIMES DAILY
Qty: 14 CAPSULE | Refills: 0 | Status: SHIPPED | OUTPATIENT
Start: 2019-01-11 | End: 2019-01-11

## 2019-01-11 RX ORDER — PREDNISONE 20 MG/1
TABLET ORAL
Qty: 10 TABLET | Refills: 0 | Status: SHIPPED | OUTPATIENT
Start: 2019-01-11 | End: 2019-01-11

## 2019-01-11 RX ORDER — PREDNISONE 20 MG/1
TABLET ORAL
Qty: 10 TABLET | Refills: 0 | Status: SHIPPED | OUTPATIENT
Start: 2019-01-11 | End: 2019-05-09

## 2019-01-11 RX ORDER — PREDNISONE 20 MG/1
40 TABLET ORAL ONCE
Status: COMPLETED | OUTPATIENT
Start: 2019-01-11 | End: 2019-01-11

## 2019-01-11 RX ADMIN — PREDNISONE 40 MG: 20 TABLET ORAL at 10:08

## 2019-01-11 RX ADMIN — IPRATROPIUM BROMIDE AND ALBUTEROL SULFATE 3 ML: .5; 3 SOLUTION RESPIRATORY (INHALATION) at 10:09

## 2019-01-11 RX ADMIN — IPRATROPIUM BROMIDE AND ALBUTEROL SULFATE 3 ML: .5; 3 SOLUTION RESPIRATORY (INHALATION) at 09:16

## 2019-01-11 ASSESSMENT — ENCOUNTER SYMPTOMS
SHORTNESS OF BREATH: 1
WHEEZING: 1
DIARRHEA: 1
COUGH: 1
BACK PAIN: 1
ABDOMINAL PAIN: 0
ABDOMINAL DISTENTION: 0
FEVER: 1

## 2019-01-11 NOTE — TELEPHONE ENCOUNTER
Patient came to clinic today stating that she was homeless and did not have a phone. She stated that at her last visit with Dr. Sanchez, they discussed doubling her DULoxetine (CYMBALTA) 60 MG capsule dosage.    Patient last seen on 12/17/2018  Next appointment scheduled on 1/17/2019    Patient would like Rx sent to [facility]    Forwarding to Dr. Sanchez to review and sign if appropriate.

## 2019-01-11 NOTE — ED AVS SNAPSHOT
[facility] Emergency Department  [address]  [facility] [address]  Phone:  [phone]  Fax:  [phone]                                    Lilliana Cardenas   MRN: [id number]    Department:  [facility] Emergency Department   Date of Visit:  1/11/2019           After Visit Summary Signature Page    I have received my discharge instructions, and my questions have been answered. I have discussed any challenges I see with this plan with the nurse or doctor.    ..........................................................................................................................................  Patient/Patient Representative Signature      ..........................................................................................................................................  Patient Representative Print Name and Relationship to Patient    ..................................................               ................................................  Date                                   Time    ..........................................................................................................................................  Reviewed by Signature/Title    ...................................................              ..............................................  Date                                               Time          22EPIC Rev 08/18

## 2019-01-11 NOTE — TELEPHONE ENCOUNTER
Medication refill information reviewed.     Due date for percocet is anytime     Prescriptions prepped for review.     Will route to provider.     Marysol LAWS-RN Care Coordinator  [facility]

## 2019-01-11 NOTE — TELEPHONE ENCOUNTER
Patient requesting refill(s) of oxyCODONE-acetaminophen (PERCOCET) 5-325 MG per tablet      Last picked up from pharmacy on 12/6/2018     Pt last seen by prescribing provider on 12/17/2018  Next appt scheduled for 1/17/2019      checked in the past 6 months? Yes If no, print current report and give to RN     Last urine drug screen date 11/19/2018  Current opioid agreement on file (completed within the last year) YES Date of opioid agreement: 11/19/2018     Processing: Patient will  at [facility]     Call her at: [phone] when ready    Will route to nursing pool for review and preparation of prescription(s).

## 2019-01-11 NOTE — ED PROVIDER NOTES
History     Chief Complaint:  Shortness of Breath; Cough; and Fever      HPI   Lilliana Cardenas is a homeless 63 year old female with a history of depression, anxiety, and self reported asthma who presents with shortness of breath, cough, fever. The patient reports having 1-2 weeks of cough that has worsened in the past 3 days along with shortness of breath and subjective fevers, prompting her presentation. She states that her albuterol has provided little relief, although this has been taken infrequently. She denies bloody sputum and chest pain. She is concerned she may have pneumonia. Reports greyish sputum associated with cough.      Allergies:  No known drug allergies    Medications:    albuterol (PROAIR HFA/PROVENTIL HFA/VENTOLIN HFA) 108 (90 Base) MCG/ACT inhaler  Carisoprodol 250 MG TABS  DULoxetine (CYMBALTA) 60 MG capsule  gabapentin (NEURONTIN) 800 MG tablet  hydrOXYzine (ATARAX) 25 MG tablet  lisinopril (PRINIVIL/ZESTRIL) 5 MG tablet  metoprolol succinate ER (TOPROL-XL) 25 MG 24 hr tablet  oxyCODONE-acetaminophen (PERCOCET) 5-325 MG per tablet  pramipexole (MIRAPEX) 0.125 MG tablet  rOPINIRole (REQUIP) 0.25 MG tablet  SUMAtriptan (IMITREX) 50 MG tablet  topiramate (TOPAMAX) 25 MG tablet  traZODone (DESYREL) 50 MG tablet    Past Medical History:    Anemia   Anxiety   Chronic pain syndrome   Depressive disorder   Diabetes mellitus (H)   Hypertension   Lower extremity neuropathy   Overdose of opiate or related narcotic (H)   Spondylosis of lumbar region without myelopathy or radiculopathy    Moderate major depression (H)   Chronic low back pain   Opiate dependence (H)    Past Surgical History:    GYN SURGERY    Family History:    History reviewed. No pertinent family history.     Social History:  Marital Status:     Tobacco Status: Current 1 PPD Smoker of 38 Years; Never Used Smokeless   Alcohol Use: No  Drug Use: No    Review of Systems   Constitutional: Positive for fever.   HENT: Positive  "for congestion.    Respiratory: Positive for cough, shortness of breath and wheezing.         Negative: Hemoptysis   Cardiovascular: Negative for chest pain.   Gastrointestinal: Positive for diarrhea. Negative for abdominal distention and abdominal pain.   Musculoskeletal: Positive for back pain.   Skin: Negative for rash.   All other systems reviewed and are negative.         Physical Exam     Patient Vitals for the past 24 hrs:   BP Temp Temp src Pulse Resp SpO2 Height   01/11/19 1000 151/86 -- -- 110 -- -- --   01/11/19 0945 157/77 -- -- -- -- -- --   01/11/19 0930 (!) 184/99 -- -- 99 -- -- --   01/11/19 0915 169/83 -- -- 81 -- -- --   01/11/19 0900 -- -- -- 89 -- 100 % --   01/11/19 0854 160/86 98.1  F (36.7  C) Oral 104 18 98 % 1.575 m (5' 2\")       Physical Exam   Constitutional: She is oriented to person, place, and time. She appears well-developed and well-nourished.   HENT:   Head: Normocephalic and atraumatic.   Eyes: Conjunctivae are normal. Right eye exhibits no discharge. Left eye exhibits no discharge.   Cardiovascular: Regular rhythm. Exam reveals no friction rub.   No murmur heard.  tachycardia   Pulmonary/Chest: Effort normal. No respiratory distress. She has wheezes.   Coarse sounds in right lung base   Abdominal: She exhibits no distension. There is no tenderness.   Neurological: She is alert and oriented to person, place, and time.   Skin: Skin is warm.   Psychiatric: She has a normal mood and affect. Her behavior is normal.       Emergency Department Course   ECG:  ECG (09:03:14):  Rate 93 bpm. AZ interval 142. QRS duration 64. QT/QTc 364/452. P-R-T axes 59  -1  15. Interpretation: Normal Sinus Rhythm; Minimal Voltage Criteria for LVH, May Be Normal Variant; Nonspecific ST and T Wave Abnormality; Abnormal ECG. Interpreted at 0905 by Chely Olvera MD on 01/11/2019.    Imaging:    X-ray Chest, 2 views:  IMPRESSION: No acute abnormality.  Result per radiology.     Laboratory:  BMP: Glucose " 103 (H) o/w WNL (Creatinine 0.77)  CBC: AWNL (WBC 9.0, HGB 12.7, )    Interventions:  0916: Duoneb 3ml Solution Nebulized  1008: Deltasone 40 mg Tablet Oral  1009: Duoneb 3ml Solution Nebulized      Emergency Department Course:  Past medical records, nursing notes, and vitals reviewed.  0918: I performed an exam of the patient and obtained history, as documented above.   Blood drawn. The patient was placed on continuous blood pressure monitoring and pulse oximetry.  The patient was sent for a chest X-ray while in the emergency department, findings above.       11:30: I rechecked the patient. Findings and plan explained to the Patient. Patient discharged home with instructions regarding supportive care, medications, and reasons to return. The importance of close follow-up was reviewed.     Impression & Plan      Medical Decision Making:  Patient is a homeless 63-year-old female who presents with cough, fever and wheezing.  Chest x-ray revealed no focal consolidation patient is not requiring any supplemental oxygen.  Patient has diffuse wheezing consistent with an asthma exacerbation.  Given patient's smoking history I do have concern that her pulmonary disease is more consistent with COPD although this has not been formally diagnosed.  We have prescribed patient a 5-day course of prednisone and a 7-day course of doxycycline.  We had  evaluate patient to ensure safe disposition.  Recommend patient follow-up with primary care physician in the next week.  Critical Care time:  none    Diagnosis:    ICD-10-CM    1. Exacerbation of asthma, unspecified asthma severity, unspecified whether persistent J45.901        Disposition:  discharged to hotel with help from     Discharge Medications:     Medication List      Started    doxycycline hyclate 100 MG capsule  Commonly known as:  VIBRAMYCIN  100 mg, Oral, 2 TIMES DAILY     predniSONE 20 MG tablet  Commonly known as:  DELTASONE  Take two tablets (=  40mg) each day for 5 (five) days              Ender Chand  1/11/2019   [facility] EMERGENCY DEPARTMENT  I, Ender Chand, am serving as a scribe at 9:18 AM on 1/11/2019 to document services personally performed by Kendall Callahan D.O. based on my observations and the provider's statements to me.         Casper Callahan, DO  Resident  01/11/19 0999

## 2019-01-11 NOTE — TELEPHONE ENCOUNTER
Patient calling requesting meds again. Very anxious and would like filled ASAP. I said I would route w high priority; call pt at  [phone]

## 2019-01-11 NOTE — ED TRIAGE NOTES
"Patient reports she is homeless and living in a shelter. She says she has been coughing for a week or two and feels short of breath and has had fevers. \"I think I have pneumonia\". Harsh cough noted in triage.  "
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
## 2019-01-11 NOTE — ED PROVIDER NOTES
Emergency Department Attending Supervision Note  1/11/2019  9:50 AM      I evaluated this patient in conjunction with Kendall Callahan D.O.      Briefly, the patient presented with prolonged cough and difficulty breathing.  She gives a history of underlying asthma though it sounds like this is been mild as all she has ever had is a rescue inhaler which she uses occasionally.    On my exam, the patient is oxygenating adequately and in no respiratory distress but does have widespread inspiratory and expiratory wheezing.    Results: Chest x-ray shows no evidence of acute infiltrate.    ED course: Patient was given duo nebs x3 along with oral prednisone.  Bronchitis with bronchospasm.    My impression is bronchitis with bronchospasm.  Given her long smoking history there may be underlying COPD.  We will treat with oral doxycycline as well as continued prednisone and bronchodilators.  The patient's situation is complicated by being homeless.  Social service has been consulted and will help with lodging.      Diagnosis    ICD-10-CM    1. Exacerbation of asthma, unspecified asthma severity, unspecified whether persistent J45.901          Chely Olvera MD [place], Susan Gail, MD  01/11/19 1228

## 2019-01-11 NOTE — TELEPHONE ENCOUNTER
"Requested Prescriptions   Pending Prescriptions Disp Refills     rOPINIRole (REQUIP) 0.25 MG tablet  Last Written Prescription Date:  10/19/18  Last Fill Quantity: 270,  # refills: 0   Last office visit: 12/10/2018 with prescribing provider:     Future Office Visit:   Next 5 appointments (look out 90 days)    Jan 17, 2019  2:00 PM CST  Return Visit with Maximiliano Sanchez MD  [facility] ([facility]) [address]  [address]  [facility] [address]  [phone]          270 tablet 0     Sig: Take 3 tablets (0.75 mg) by mouth At Bedtime    Antiparkinson's Agents Protocol Failed - 1/11/2019 12:47 PM       Failed - Blood pressure under 140/90 in past 12 months    BP Readings from Last 3 Encounters:   01/11/19 166/90   12/19/18 144/75   12/17/18 129/84                Passed - CBC on record in past 12 months    Recent Labs   Lab Test 01/11/19  0939   WBC 9.0   RBC 4.25   HGB 12.7   HCT 39.8                   Passed - ALT on record in past 12 months        Recent Labs   Lab Test 11/25/18  0805   ALT 17            Passed - Serum Creatinine on file in past 12 months    Recent Labs   Lab Test 01/11/19  0939   CR 0.77            Passed - Medication is active on med list       Passed - Patient is age 18 or older       Passed - No active pregnancy on record       Passed - No positive pregnancy test in the past 12 months       Passed - Recent (6 mo) or future (30 days) visit within the authorizing provider's specialty    Patient had office visit in the last 6 months or has a visit in the next 30 days with authorizing provider or within the authorizing provider's specialty.  See \"Patient Info\" tab in inbasket, or \"Choose Columns\" in Meds & Orders section of the refill encounter.            pramipexole (MIRAPEX) 0.125 MG tablet  Last Written Prescription Date:  Hist.  Last Fill Quantity: Hist,  # refills:    Last office visit: 12/10/2018 with prescribing provider:   " "  Future Office Visit:   Next 5 appointments (look out 90 days)    Jan 17, 2019  2:00 PM CST  Return Visit with Maximiliano Sanchez MD  [facility] ([facility]) [address] [address] [address]  [phone]                Sig: Take 1 tablet (0.125 mg) by mouth At Bedtime    Antiparkinson's Agents Protocol Failed - 1/11/2019 12:47 PM       Failed - Blood pressure under 140/90 in past 12 months    BP Readings from Last 3 Encounters:   01/11/19 166/90   12/19/18 144/75   12/17/18 129/84                Passed - CBC on record in past 12 months    Recent Labs   Lab Test 01/11/19  0939   WBC 9.0   RBC 4.25   HGB 12.7   HCT 39.8                   Passed - ALT on record in past 12 months        Recent Labs   Lab Test 11/25/18  0805   ALT 17            Passed - Serum Creatinine on file in past 12 months    Recent Labs   Lab Test 01/11/19  0939   CR 0.77            Passed - Medication is active on med list       Passed - Patient is age 18 or older       Passed - No active pregnancy on record       Passed - No positive pregnancy test in the past 12 months       Passed - Recent (6 mo) or future (30 days) visit within the authorizing provider's specialty    Patient had office visit in the last 6 months or has a visit in the next 30 days with authorizing provider or within the authorizing provider's specialty.  See \"Patient Info\" tab in inbasket, or \"Choose Columns\" in Meds & Orders section of the refill encounter.              "

## 2019-01-14 RX ORDER — DULOXETIN HYDROCHLORIDE 60 MG/1
60 CAPSULE, DELAYED RELEASE ORAL DAILY
Qty: 30 CAPSULE | Refills: 1 | Status: SHIPPED | OUTPATIENT
Start: 2019-01-14 | End: 2019-01-14

## 2019-01-14 RX ORDER — PRAMIPEXOLE DIHYDROCHLORIDE 0.12 MG/1
0.12 TABLET ORAL AT BEDTIME
Qty: 90 TABLET | Refills: 0 | Status: SHIPPED | OUTPATIENT
Start: 2019-01-14 | End: 2019-12-17

## 2019-01-14 RX ORDER — ROPINIROLE 0.25 MG/1
0.75 TABLET, FILM COATED ORAL AT BEDTIME
Qty: 270 TABLET | Refills: 0 | Status: SHIPPED | OUTPATIENT
Start: 2019-01-14 | End: 2019-03-20

## 2019-01-14 RX ORDER — DULOXETIN HYDROCHLORIDE 60 MG/1
60 CAPSULE, DELAYED RELEASE ORAL DAILY
Qty: 90 CAPSULE | Refills: 3 | Status: SHIPPED | OUTPATIENT
Start: 2019-01-14 | End: 2019-12-17

## 2019-01-14 RX ORDER — OXYCODONE AND ACETAMINOPHEN 5; 325 MG/1; MG/1
TABLET ORAL
Qty: 90 TABLET | Refills: 0 | Status: SHIPPED | OUTPATIENT
Start: 2019-01-14 | End: 2019-02-14

## 2019-01-14 NOTE — TELEPHONE ENCOUNTER
As discussed at the previous visit, I will decrease her percocet 5-325 to 3 tablets per day as she reported that she usually took 2-3 tablets daily.    The prescription will read Oxycodone 1-2 tabs every 6 hours as needed, maximum three tablets daily. Ok to dispense on 1/14/19 and start 1/14/19    DO [facility]

## 2019-01-14 NOTE — TELEPHONE ENCOUNTER
Script was left up front for pt. To .  Galilea JAUREGUI LPN  Pain Management   [address]  [place] [address]

## 2019-01-14 NOTE — TELEPHONE ENCOUNTER
Did not increase dose at this time.     Duloxetine 60mg daily signed.      DO [facility]

## 2019-01-17 ENCOUNTER — ANCILLARY PROCEDURE (OUTPATIENT)
Dept: GENERAL RADIOLOGY | Facility: CLINIC | Age: 64
End: 2019-01-17
Payer: COMMERCIAL

## 2019-01-17 ENCOUNTER — OFFICE VISIT (OUTPATIENT)
Dept: PALLIATIVE MEDICINE | Facility: CLINIC | Age: 64
End: 2019-01-17
Payer: COMMERCIAL

## 2019-01-17 ENCOUNTER — OFFICE VISIT (OUTPATIENT)
Dept: INTERNAL MEDICINE | Facility: CLINIC | Age: 64
End: 2019-01-17
Payer: COMMERCIAL

## 2019-01-17 VITALS
HEIGHT: 62 IN | SYSTOLIC BLOOD PRESSURE: 112 MMHG | TEMPERATURE: 98 F | RESPIRATION RATE: 16 BRPM | OXYGEN SATURATION: 100 % | BODY MASS INDEX: 32.22 KG/M2 | HEART RATE: 90 BPM | DIASTOLIC BLOOD PRESSURE: 58 MMHG | WEIGHT: 175.1 LBS

## 2019-01-17 VITALS — OXYGEN SATURATION: 100 % | DIASTOLIC BLOOD PRESSURE: 75 MMHG | HEART RATE: 84 BPM | SYSTOLIC BLOOD PRESSURE: 116 MMHG

## 2019-01-17 DIAGNOSIS — M17.10 ARTHRITIS OF KNEE: ICD-10-CM

## 2019-01-17 DIAGNOSIS — F41.8 DEPRESSION WITH ANXIETY: ICD-10-CM

## 2019-01-17 DIAGNOSIS — G89.4 CHRONIC PAIN SYNDROME: ICD-10-CM

## 2019-01-17 DIAGNOSIS — Z79.891 CHRONIC PRESCRIPTION OPIATE USE: ICD-10-CM

## 2019-01-17 DIAGNOSIS — G89.29 CHRONIC LOW BACK PAIN, UNSPECIFIED BACK PAIN LATERALITY, WITH SCIATICA PRESENCE UNSPECIFIED: ICD-10-CM

## 2019-01-17 DIAGNOSIS — M54.5 CHRONIC LOW BACK PAIN, UNSPECIFIED BACK PAIN LATERALITY, WITH SCIATICA PRESENCE UNSPECIFIED: ICD-10-CM

## 2019-01-17 DIAGNOSIS — J44.1 CHRONIC OBSTRUCTIVE PULMONARY DISEASE WITH ACUTE EXACERBATION (H): Primary | ICD-10-CM

## 2019-01-17 DIAGNOSIS — N32.81 OAB (OVERACTIVE BLADDER): ICD-10-CM

## 2019-01-17 DIAGNOSIS — M79.7 FIBROMYALGIA: Primary | ICD-10-CM

## 2019-01-17 PROCEDURE — 99214 OFFICE O/P EST MOD 30 MIN: CPT | Performed by: PHYSICAL MEDICINE & REHABILITATION

## 2019-01-17 PROCEDURE — 99214 OFFICE O/P EST MOD 30 MIN: CPT | Performed by: NURSE PRACTITIONER

## 2019-01-17 PROCEDURE — 73562 X-RAY EXAM OF KNEE 3: CPT | Mod: LT

## 2019-01-17 RX ORDER — LOSARTAN POTASSIUM 25 MG/1
25 TABLET ORAL DAILY
Qty: 90 TABLET | Refills: 3 | Status: SHIPPED | OUTPATIENT
Start: 2019-01-17 | End: 2019-01-23

## 2019-01-17 RX ORDER — GABAPENTIN 800 MG/1
800 TABLET ORAL 3 TIMES DAILY
Qty: 90 TABLET | Refills: 0 | Status: SHIPPED | OUTPATIENT
Start: 2019-01-17 | End: 2019-01-23

## 2019-01-17 RX ORDER — OXYBUTYNIN CHLORIDE 5 MG/1
5 TABLET, EXTENDED RELEASE ORAL DAILY
Qty: 90 TABLET | Refills: 3 | Status: SHIPPED | OUTPATIENT
Start: 2019-01-17 | End: 2019-12-17

## 2019-01-17 ASSESSMENT — MIFFLIN-ST. JEOR: SCORE: 1312.5

## 2019-01-17 ASSESSMENT — PAIN SCALES - GENERAL: PAINLEVEL: WORST PAIN (10)

## 2019-01-17 NOTE — PROGRESS NOTES
SUBJECTIVE:   Lilliana Cardenas is a 63 year old female who presents to clinic today for the following health issues:      ED/UC Followup:    Facility:  [facility]  Date of visit: 01/11/19  Reason for visit: asthma  Current Status: still coughing, afebrile, some wheezing.  Requests change BP med from lisinopril due to coughing.  Increased problem with overactive bladder and incontinence.  [facility] and [facility]              Problem list and histories reviewed & adjusted, as indicated.  Additional history: as documented    Patient Active Problem List   Diagnosis     Chronic pain syndrome     Chronic low back pain     Opiate dependence (H)     Overdose     Moderate major depression (H)     Depression     Spondylosis of lumbar region without myelopathy or radiculopathy     Past Surgical History:   Procedure Laterality Date     GYN SURGERY         Social History     Tobacco Use     Smoking status: Light Tobacco Smoker     Packs/day: 1.00     Years: 38.00     Pack years: 38.00     Smokeless tobacco: Never Used     Tobacco comment: pt states she has smoked on & off for since she was 18   Substance Use Topics     Alcohol use: No     History reviewed. No pertinent family history.      Current Outpatient Medications   Medication Sig Dispense Refill     albuterol (PROAIR HFA/PROVENTIL HFA/VENTOLIN HFA) 108 (90 Base) MCG/ACT inhaler Inhale 2 puffs into the lungs 4 times daily as needed for other (dyspnea) 1 Inhaler 1     Carisoprodol 250 MG TABS Take 250 mg by mouth 4 times daily as needed 16 tablet 0     doxycycline hyclate (VIBRAMYCIN) 100 MG capsule Take 1 capsule (100 mg) by mouth 2 times daily for 7 days 14 capsule 0     DULoxetine (CYMBALTA) 60 MG capsule Take 1 capsule (60 mg) by mouth daily 90 capsule 3     fluticasone-salmeterol (ADVAIR) 250-50 MCG/DOSE inhaler Inhale 1 puff into the lungs every 12 hours 1 Inhaler 3     hydrOXYzine (ATARAX) 25 MG tablet Take 1 tablet (25 mg) by mouth 4  times daily as needed for itching 120 tablet 1     ketoprofen 10% in PLO 10% topical gel Place 2-4 g onto the skin every 4 hours as needed for moderate pain 200 g 1     losartan (COZAAR) 25 MG tablet Take 1 tablet (25 mg) by mouth daily 90 tablet 3     omeprazole (PRILOSEC) 20 MG DR capsule Take 20 mg by mouth daily       oxybutynin ER (DITROPAN-XL) 5 MG 24 hr tablet Take 1 tablet (5 mg) by mouth daily 90 tablet 3     oxyCODONE-acetaminophen (PERCOCET) 5-325 MG tablet 1-2 tabs every 6 hours as needed, maximum three tablets daily. Ok to dispense on 1/14/19 and start 1/14/19 90 tablet 0     pramipexole (MIRAPEX) 0.125 MG tablet Take 1 tablet (0.125 mg) by mouth At Bedtime 90 tablet 0     predniSONE (DELTASONE) 20 MG tablet Take two tablets (= 40mg) each day for 5 (five) days. 10 tablet 0     rOPINIRole (REQUIP) 0.25 MG tablet Take 3 tablets (0.75 mg) by mouth At Bedtime 270 tablet 0     SUMAtriptan (IMITREX) 50 MG tablet TAKE 1 TAB AT ONSET OF MIGRAINE. MAY REPEAT AFTER 2 HRS. MAX 2 TABS/DAY 21 tablet 0     gabapentin (NEURONTIN) 800 MG tablet Take 1 tablet (800 mg) by mouth 3 times daily 90 tablet 0     lisinopril (PRINIVIL/ZESTRIL) 5 MG tablet Take 1 tablet (5 mg) by mouth daily 30 tablet 0     metoprolol succinate ER (TOPROL-XL) 25 MG 24 hr tablet Take 1 tablet (25 mg) by mouth daily 30 tablet 0     topiramate (TOPAMAX) 25 MG tablet Take 3 tablets (75 mg) by mouth 3 times daily 270 tablet 0     traZODone (DESYREL) 50 MG tablet Take 1 tablet (50 mg) by mouth nightly as needed for sleep 30 tablet 0     BP Readings from Last 3 Encounters:   01/17/19 112/58   01/11/19 (!) 139/100   12/19/18 144/75    Wt Readings from Last 3 Encounters:   01/17/19 79.4 kg (175 lb 1.6 oz)   12/17/18 79.4 kg (175 lb)   12/10/18 82.3 kg (181 lb 6.4 oz)                    Reviewed and updated as needed this visit by clinical staff  Tobacco  Allergies  Meds  Med Hx  Surg Hx  Fam Hx  Soc Hx      Reviewed and updated as needed this visit  "by Provider         ROS:  CONSTITUTIONAL: NEGATIVE for fever, chills, change in weight  ENT/MOUTH: NEGATIVE for ear, mouth and throat problems  CV: NEGATIVE for chest pain, palpitations or peripheral edema    OBJECTIVE:     /58 (BP Location: Right arm, Patient Position: Sitting, Cuff Size: Adult Large)   Pulse 90   Temp 98  F (36.7  C) (Oral)   Resp 16   Ht 1.575 m (5' 2\")   Wt 79.4 kg (175 lb 1.6 oz)   SpO2 100%   BMI 32.03 kg/m    Body mass index is 32.03 kg/m .  GENERAL: alert, no distress and in WC  RESP: expiratory wheezes R upper posterior  CV: regular rate and rhythm, normal S1 S2, no S3 or S4, no murmur, click or rub, no peripheral edema and peripheral pulses strong        ASSESSMENT/PLAN:               ICD-10-CM    1. Chronic obstructive pulmonary disease with acute exacerbation (H) J44.1 fluticasone-salmeterol (ADVAIR) 250-50 MCG/DOSE inhaler     losartan (COZAAR) 25 MG tablet   2. Chronic pain syndrome G89.4    3. OAB (overactive bladder) N32.81 oxybutynin ER (DITROPAN-XL) 5 MG 24 hr tablet     Daily advair, albuterol as prn  Trial of oxybutynin  Stop lisinopril  F/u 1 month        Bibi Chau NP  [facility]    "

## 2019-01-17 NOTE — PROGRESS NOTES
[facility]    Date of visit: 1/17/2019    Chief complaint:   Chief Complaint   Patient presents with     Pain       Interval history:  Lilliana Cardenas is a 63 year old female last seen by me on 12/17/18.      Recommendations/plan at the last visit included:     1. Physical Therapy: Chronic pain PT referral placed.  2. Clinical Health Pain Psychologist: Would certainly benefit from biofeed back/coping strategies. Recently started working with a psychologist on stress management. Will place referral to pain phd once she has completed this.  3. Self Care Recommendations: Gentle progressive exercise that does not increase pain - gradually increase daily walking program.  Take mini breaks - 5 minutes of mindfullness a couple times a day.   4. Diagnostic Studies: none at this time  5. Medication Management:   1. Oxycodone 5/325 every 6 hours prn - Currently reports taking 2-3 per day. She filled her last prescription on 12/6 which was for 180 tablets. Will decrease this at the next visit.  2. Recommended against using carisoprodol, discussed that I would not prescribe this medication in the future.  3. Continue duloxetine 60mg daily, consider increasing this in the future.  4. Continue Gabapentin 800mg TID  5. Topamax 75mg TID  6. Sumatriptan 50mg prn  6. Further procedures recommended:   1. Bilateral greater trochanter bursa injections performed in clinic on 10/23/18, can repeat this in the future if needed.  2. Caudal epidural steroid injection ordered.  7. Referrals: none  8. Follow up: 4 weeks in clinic          Since her last visit, Lilliana Cardenas reports:  -She had bilateral GT bursa injections on 10/23 with ongoing benefit, hip pain has been better.  -Lumbar epidural was done on 12/19/18 with improvement in back and pain radiating into her legs.  -Has had issues paying for medications so she hasn't been taking duloxetine, percocet or gabapentin recently.  Feels that the pain in her feet has been worse over the past couple weeks. She isn't sure whether its because of this or possibly the steroid burst she is currently taking.  -She had a upper respiratory issue and went to the ED. They gave her some nebs which improved her symptoms and she was discharged with a prednisone burst.  -Has been in and out of a re-entry/crisis housing. Has had difficulty finding a place to live.        Pain scores:  Pain intensity on average is 8 on a scale of 0-10.     Current pain treatments:   -Hydroxyzine 25 mg QID prn  -Gabapentin 800mg TID  -Trazodone 50mg hs prn (3-4 times/week)  -Imitrex 50mg prn  -Cymbalta 60mg qd  -Percocet 1-2 5/235 tablets q6h prn (takes 2 tablets/day)  -Topiramate 75mg TID    Past pain treatments:  Medications:  -Carisoprodol 250mg QID prn    2. Physical Therapy: last PT was 5+ years ago                TENS unit: helps a little, doesn't have it with her.   3. Pain psychology: did this at [facility] was helpful  4. Surgery: SCSx2, lumbar surgeries x5 with decompression and fusion in 2009  5. Injections: epidurals were helpful, last was 4 years ago  6. Alternative Therapies:               Chiropractic: didn't like               Acupuncture: helpful           Side Effects: no side effect    Medications:  Current Outpatient Medications   Medication Sig Dispense Refill     gabapentin (NEURONTIN) 800 MG tablet Take 1 tablet (800 mg) by mouth 3 times daily 90 tablet 0     albuterol (PROAIR HFA/PROVENTIL HFA/VENTOLIN HFA) 108 (90 Base) MCG/ACT inhaler Inhale 2 puffs into the lungs 4 times daily as needed for other (dyspnea) 1 Inhaler 1     Carisoprodol 250 MG TABS Take 250 mg by mouth 4 times daily as needed 16 tablet 0     doxycycline hyclate (VIBRAMYCIN) 100 MG capsule Take 1 capsule (100 mg) by mouth 2 times daily for 7 days 14 capsule 0     DULoxetine (CYMBALTA) 60 MG capsule Take 1 capsule (60 mg) by mouth daily 90 capsule 3     fluticasone-salmeterol (ADVAIR) 250-50  MCG/DOSE inhaler Inhale 1 puff into the lungs every 12 hours 1 Inhaler 3     hydrOXYzine (ATARAX) 25 MG tablet Take 1 tablet (25 mg) by mouth 4 times daily as needed for itching 120 tablet 1     ketoprofen 10% in PLO 10% topical gel Place 2-4 g onto the skin every 4 hours as needed for moderate pain 200 g 1     lisinopril (PRINIVIL/ZESTRIL) 5 MG tablet Take 1 tablet (5 mg) by mouth daily 30 tablet 0     losartan (COZAAR) 25 MG tablet Take 1 tablet (25 mg) by mouth daily 90 tablet 3     metoprolol succinate ER (TOPROL-XL) 25 MG 24 hr tablet Take 1 tablet (25 mg) by mouth daily 30 tablet 0     omeprazole (PRILOSEC) 20 MG DR capsule Take 20 mg by mouth daily       oxybutynin ER (DITROPAN-XL) 5 MG 24 hr tablet Take 1 tablet (5 mg) by mouth daily 90 tablet 3     oxyCODONE-acetaminophen (PERCOCET) 5-325 MG tablet 1-2 tabs every 6 hours as needed, maximum three tablets daily. Ok to dispense on 1/14/19 and start 1/14/19 90 tablet 0     pramipexole (MIRAPEX) 0.125 MG tablet Take 1 tablet (0.125 mg) by mouth At Bedtime 90 tablet 0     predniSONE (DELTASONE) 20 MG tablet Take two tablets (= 40mg) each day for 5 (five) days. (Patient not taking: Reported on 1/17/2019.) 10 tablet 0     rOPINIRole (REQUIP) 0.25 MG tablet Take 3 tablets (0.75 mg) by mouth At Bedtime 270 tablet 0     SUMAtriptan (IMITREX) 50 MG tablet TAKE 1 TAB AT ONSET OF MIGRAINE. MAY REPEAT AFTER 2 HRS. MAX 2 TABS/DAY 21 tablet 0     topiramate (TOPAMAX) 25 MG tablet Take 3 tablets (75 mg) by mouth 3 times daily 270 tablet 0     traZODone (DESYREL) 50 MG tablet Take 1 tablet (50 mg) by mouth nightly as needed for sleep 30 tablet 0       Medical History: any changes in medical history since they were last seen? Yes  Was treated for upper respiratory difficulty, was given prednisone and albuterol nebs. She had a reaction to this, foot swelling and pain but this improved.    Review of Systems:  The 14 system ROS was reviewed from the intake questionnaire, and is  positive for: headache, dizziness, cough, wheezing, shortness of breath, lower extremity edema, hypertension, abd pain, diarrhea, joint pain, arthritis, stiffness, gout, back pain, neck pain, paresthesias, weakness  Any bowel or bladder problems: frequency, urgency, incontinence.  Mood: depression, anxiety, stress    Physical Exam:  Blood pressure 116/75, pulse 84, SpO2 100 %.  General: NAD, pleasant  Gait: Unsteady, uses powered mobility.  MSK exam: widespread myofascial tenderness. Lumbar paraspinal tenderness bilaterally.     Assessment:   Ms. Tijerina is a 63 year old with past medical history including DM, HTN, anxiety depression and chronic pain who presents for evaluation and treatment of chronic low back pain.     1. Chronic low back and leg pain: History of several lumbar surgeries with ongoing pain. She has had spinal cord stimulators in the past with good relief but they were removed due to discomfort from the battery. Her last lumbar surgery was a decompression and fusion in 2009, no spinal cord stimulator after this. She has had persisting pain since the 2009 surgery which is consistent with lumbar spondylosis and radiculopathy. She also has arachnoiditis seen on prior MRI/CT scan which is also likely contributing to her symptoms. She had epidurals as recently as 4 years ago with good benefit. Repeated epidural on 12/19/18 with significant pain relief.     2. Chronic bilateral hip pain:  The pain is likely due to gluteal muscle dysfunction/greater trochanter bursitis and IT band syndrome. Bilateral greater trochanter bursa injections performed in clinic on 10/23/18 with ongoing benefit.     3. Fibromyalgia: Meets ACR criteria for fibromyalgia. Has tried many medications in the past and is currently on gabapentin 800mg with benefit. Was started on duloxetine which was increased to 60mg daily, notes a lot of benefit for her chronic pain with this medication.      4. Chronic opiate use: Has been on  percocet for many years now. States that she has functional benefit in ADLs and mobility and uses this less than prescribed. Discussed goals of the chronic pain clinic include trying to wean opioids as pain is better managed with other treatments and modalities and she was agreeable to this. In addition she received a prescription for carisoprodol recently, she states that she hasn't picked it up due to cost issues. Discussed that I strongly recommend that she avoid starting this medication due to side effects and potential addiction issues.     Mental Health - the patient's mental health concerns, specifically stress, anxiety and depression, affect her experience of pain and contribute to her clinically significant distress.           Plan:  The following recommendations were given to the patient. Diagnosis, treatment options, risks, benefits, and alternatives were discussed, and all questions were answered. The patient expressed understanding of the plan for management.      I am recommending a multidisciplinary treatment plan to help this patient better manage her pain.  This includes:      9. Physical Therapy: Chronic pain PT referral placed. She wasn't able to make the last appointment due to her living situation.  10. Clinical Health Pain Psychologist: Would certainly benefit from biofeed back/coping strategies. Recently started working with a psychologist on stress management. Will place referral to pain phd once she has completed this.  11. Self Care Recommendations: Gentle progressive exercise that does not increase pain - gradually increase daily walking program.  Take mini breaks - 5 minutes of mindfullness a couple times a day.   12. Diagnostic Studies: none at this time  13. Workup - Bilateral knee XR ordered today.  14. Medication Management:   1. Oxycodone 5/325 every 6 hours prn - max 3/day.  2. Recommended against using carisoprodol, she has not refilled this medication.  3. Continue duloxetine 60mg  daily.  4. Continue Gabapentin 800mg TID  5. Topamax 75mg TID  6. Sumatriptan 50mg prn  15. Further procedures recommended:   1. Bilateral greater trochanter bursa injections performed in clinic on 10/23/18, can repeat this in the future if needed.  2. Caudal epidural with significant relief, can be repeated every 3 months as needed.  3. Consider bilateral knee injection in the future, consider synvisc use instead of steroid to decrease steroid burden.  16. Referrals: none  17. Follow up: 4 weeks in clinic        I spent 40 minutes of time face to face with the patient.  Greater than 50% of this time was spent in patient counseling and/or coordination of care regarding principles of multidisciplinary care, medication management, and treatment options as discussed above.           Maximiliano Sanchez DO  [facility]  1/17/2019

## 2019-01-17 NOTE — PATIENT INSTRUCTIONS
1. Continue your current pain regimen:   -Gabapentin 800mg TID. I ordered a new prescription.  -Trazodone 50mg hs prn (3-4 times/week)  -Imitrex 50mg prn  -Cymbalta 60mg qd  -Percocet  5/235 tablets q6h prn (take 3 tablets/day maximum)  -Topiramate 75mg TID  -Ibuprofen (600mg max 3 times/day)  -Tylenol (1000mg max 3 times/day)  2. We can repeat the epidural injection every 3 months as needed.  3. I ordered knee XRs, have this done before you leave today.  4. Schedule follow-up for 1 month in clinic.        ----------------------------------------------------------------  Clinic Number:  [phone]   Call this number with any questions about your care and for scheduling assistance. Calls are returned Monday through Friday between 8 AM and 4:30 PM. We usually get back to you within 2 business days depending on the issue/request.       Medication refills:    For non-narcotic medications, call your pharmacy directly to request a refill. The pharmacy will contact the [facility] for authorization. Please allow 3-4 days for these refills to be processed.     For narcotic refills, call the clinic number or send a [employer] message. Please contact us 7-10 days before your refill is due. The message MUST include the name of the specific medication(s) requested and how you would like to receive the prescription(s). The options are as follows:    Pain Clinic staff can mail the prescription to your pharmacy. Please tell us the name of the pharmacy.    You may pick the prescription up at the Pain Clinic (tell us the location) or during a clinic visit with your pain provider    Pain Clinic staff can deliver the prescription to the [facility] pharmacy in the clinic building. Please tell us the location.      We believe regular attendance is key to your success in our program.    Any time you are unable to keep your appointment we ask that you call us at least 24 hours in advance to let us know. This will allow us to offer  the appointment time to another patient.

## 2019-01-22 ENCOUNTER — APPOINTMENT (OUTPATIENT)
Dept: GENERAL RADIOLOGY | Facility: CLINIC | Age: 64
End: 2019-01-22
Payer: COMMERCIAL

## 2019-01-22 ENCOUNTER — HOSPITAL ENCOUNTER (EMERGENCY)
Facility: CLINIC | Age: 64
Discharge: HOME | End: 2019-01-23
Attending: EMERGENCY MEDICINE | Admitting: EMERGENCY MEDICINE
Payer: COMMERCIAL

## 2019-01-22 DIAGNOSIS — R06.02 SHORTNESS OF BREATH: ICD-10-CM

## 2019-01-22 DIAGNOSIS — M54.5 CHRONIC LOW BACK PAIN, UNSPECIFIED BACK PAIN LATERALITY, WITH SCIATICA PRESENCE UNSPECIFIED: ICD-10-CM

## 2019-01-22 DIAGNOSIS — G89.4 CHRONIC PAIN SYNDROME: ICD-10-CM

## 2019-01-22 DIAGNOSIS — I10 BENIGN ESSENTIAL HYPERTENSION: ICD-10-CM

## 2019-01-22 DIAGNOSIS — J44.1 CHRONIC OBSTRUCTIVE PULMONARY DISEASE WITH ACUTE EXACERBATION (H): ICD-10-CM

## 2019-01-22 DIAGNOSIS — F33.9 RECURRENT MAJOR DEPRESSIVE DISORDER, REMISSION STATUS UNSPECIFIED (H): ICD-10-CM

## 2019-01-22 DIAGNOSIS — I10 ESSENTIAL HYPERTENSION: ICD-10-CM

## 2019-01-22 DIAGNOSIS — G89.29 CHRONIC LOW BACK PAIN, UNSPECIFIED BACK PAIN LATERALITY, WITH SCIATICA PRESENCE UNSPECIFIED: ICD-10-CM

## 2019-01-22 LAB
ANION GAP SERPL CALCULATED.3IONS-SCNC: 8 MMOL/L (ref 3–14)
BUN SERPL-MCNC: 16 MG/DL (ref 7–30)
CALCIUM SERPL-MCNC: 8.4 MG/DL (ref 8.5–10.1)
CHLORIDE SERPL-SCNC: 108 MMOL/L (ref 94–109)
CO2 SERPL-SCNC: 23 MMOL/L (ref 20–32)
CREAT SERPL-MCNC: 0.8 MG/DL (ref 0.52–1.04)
DIFFERENTIAL METHOD BLD: ABNORMAL
EOSINOPHIL # BLD AUTO: 0.2 10E9/L (ref 0–0.7)
EOSINOPHIL NFR BLD AUTO: 2 %
ERYTHROCYTE [DISTWIDTH] IN BLOOD BY AUTOMATED COUNT: 13.2 % (ref 10–15)
GFR SERPL CREATININE-BSD FRML MDRD: 79 ML/MIN/{1.73_M2}
GLUCOSE SERPL-MCNC: 98 MG/DL (ref 70–99)
HCT VFR BLD AUTO: 37.3 % (ref 35–47)
HGB BLD-MCNC: 11.9 G/DL (ref 11.7–15.7)
LYMPHOCYTES # BLD AUTO: 4.8 10E9/L (ref 0.8–5.3)
LYMPHOCYTES NFR BLD AUTO: 40 %
MCH RBC QN AUTO: 29.4 PG (ref 26.5–33)
MCHC RBC AUTO-ENTMCNC: 31.9 G/DL (ref 31.5–36.5)
MCV RBC AUTO: 92 FL (ref 78–100)
MONOCYTES # BLD AUTO: 0.5 10E9/L (ref 0–1.3)
MONOCYTES NFR BLD AUTO: 4 %
NEUTROPHILS # BLD AUTO: 6.5 10E9/L (ref 1.6–8.3)
NEUTROPHILS NFR BLD AUTO: 54 %
PLATELET # BLD AUTO: 286 10E9/L (ref 150–450)
POTASSIUM SERPL-SCNC: 3.5 MMOL/L (ref 3.4–5.3)
RBC # BLD AUTO: 4.05 10E12/L (ref 3.8–5.2)
SODIUM SERPL-SCNC: 139 MMOL/L (ref 133–144)
TROPONIN I SERPL-MCNC: <0.015 UG/L (ref 0–0.04)
WBC # BLD AUTO: 12 10E9/L (ref 4–11)

## 2019-01-22 PROCEDURE — 93010 ELECTROCARDIOGRAM REPORT: CPT | Mod: Z6 | Performed by: EMERGENCY MEDICINE

## 2019-01-22 PROCEDURE — 85025 COMPLETE CBC W/AUTO DIFF WBC: CPT | Performed by: EMERGENCY MEDICINE

## 2019-01-22 PROCEDURE — 90791 PSYCH DIAGNOSTIC EVALUATION: CPT

## 2019-01-22 PROCEDURE — 25000132 ZZH RX MED GY IP 250 OP 250 PS 637: Performed by: EMERGENCY MEDICINE

## 2019-01-22 PROCEDURE — 71046 X-RAY EXAM CHEST 2 VIEWS: CPT

## 2019-01-22 PROCEDURE — 94640 AIRWAY INHALATION TREATMENT: CPT | Performed by: EMERGENCY MEDICINE

## 2019-01-22 PROCEDURE — 93005 ELECTROCARDIOGRAM TRACING: CPT | Performed by: EMERGENCY MEDICINE

## 2019-01-22 PROCEDURE — 99285 EMERGENCY DEPT VISIT HI MDM: CPT | Mod: 25 | Performed by: EMERGENCY MEDICINE

## 2019-01-22 PROCEDURE — 80048 BASIC METABOLIC PNL TOTAL CA: CPT | Performed by: EMERGENCY MEDICINE

## 2019-01-22 PROCEDURE — 84484 ASSAY OF TROPONIN QUANT: CPT | Performed by: EMERGENCY MEDICINE

## 2019-01-22 PROCEDURE — 25000125 ZZHC RX 250: Performed by: EMERGENCY MEDICINE

## 2019-01-22 RX ORDER — IPRATROPIUM BROMIDE AND ALBUTEROL SULFATE 2.5; .5 MG/3ML; MG/3ML
3 SOLUTION RESPIRATORY (INHALATION) ONCE
Status: COMPLETED | OUTPATIENT
Start: 2019-01-22 | End: 2019-01-22

## 2019-01-22 RX ORDER — METOPROLOL SUCCINATE 25 MG/1
25 TABLET, EXTENDED RELEASE ORAL ONCE
Status: COMPLETED | OUTPATIENT
Start: 2019-01-22 | End: 2019-01-22

## 2019-01-22 RX ORDER — ALBUTEROL SULFATE 90 UG/1
2 AEROSOL, METERED RESPIRATORY (INHALATION) ONCE
Status: COMPLETED | OUTPATIENT
Start: 2019-01-22 | End: 2019-01-22

## 2019-01-22 RX ORDER — LOSARTAN POTASSIUM 25 MG/1
25 TABLET ORAL ONCE
Status: COMPLETED | OUTPATIENT
Start: 2019-01-22 | End: 2019-01-22

## 2019-01-22 RX ORDER — NITROGLYCERIN 0.4 MG/1
0.4 TABLET SUBLINGUAL
Status: COMPLETED | OUTPATIENT
Start: 2019-01-22 | End: 2019-01-22

## 2019-01-22 RX ORDER — METOPROLOL TARTRATE 1 MG/ML
5 INJECTION, SOLUTION INTRAVENOUS ONCE
Status: DISCONTINUED | OUTPATIENT
Start: 2019-01-22 | End: 2019-01-23 | Stop reason: HOSPADM

## 2019-01-22 RX ADMIN — ALBUTEROL SULFATE 2 PUFF: 90 AEROSOL, METERED RESPIRATORY (INHALATION) at 20:19

## 2019-01-22 RX ADMIN — GABAPENTIN 800 MG: 100 CAPSULE ORAL at 22:44

## 2019-01-22 RX ADMIN — IPRATROPIUM BROMIDE AND ALBUTEROL SULFATE 3 ML: .5; 3 SOLUTION RESPIRATORY (INHALATION) at 22:22

## 2019-01-22 RX ADMIN — METOPROLOL SUCCINATE 25 MG: 25 TABLET, EXTENDED RELEASE ORAL at 21:49

## 2019-01-22 RX ADMIN — LOSARTAN POTASSIUM 25 MG: 25 TABLET ORAL at 21:49

## 2019-01-22 RX ADMIN — NITROGLYCERIN 0.4 MG: 0.4 TABLET SUBLINGUAL at 20:19

## 2019-01-22 ASSESSMENT — ENCOUNTER SYMPTOMS: DYSPHORIC MOOD: 1

## 2019-01-22 NOTE — ED AVS SNAPSHOT
[facility], [place], Emergency Department  [address] [place] [id number]  Phone:  [phone]                                    Lilliana Cardenas   MRN: [id number]    Department:  [facility], Emergency Department   Date of Visit:  1/22/2019           After Visit Summary Signature Page    I have received my discharge instructions, and my questions have been answered. I have discussed any challenges I see with this plan with the nurse or doctor.    ..........................................................................................................................................  Patient/Patient Representative Signature      ..........................................................................................................................................  Patient Representative Print Name and Relationship to Patient    ..................................................               ................................................  Date                                   Time    ..........................................................................................................................................  Reviewed by Signature/Title    ...................................................              ..............................................  Date                                               Time          22EPIC Rev 08/18

## 2019-01-23 ENCOUNTER — TELEPHONE (OUTPATIENT)
Dept: INTERNAL MEDICINE | Facility: CLINIC | Age: 64
End: 2019-01-23

## 2019-01-23 VITALS
WEIGHT: 175 LBS | OXYGEN SATURATION: 100 % | RESPIRATION RATE: 13 BRPM | DIASTOLIC BLOOD PRESSURE: 94 MMHG | BODY MASS INDEX: 32.01 KG/M2 | TEMPERATURE: 98.3 F | HEART RATE: 96 BPM | SYSTOLIC BLOOD PRESSURE: 165 MMHG

## 2019-01-23 LAB
INTERPRETATION ECG - MUSE: NORMAL
RADIOLOGIST FLAGS: ABNORMAL

## 2019-01-23 PROCEDURE — 25000132 ZZH RX MED GY IP 250 OP 250 PS 637: Performed by: EMERGENCY MEDICINE

## 2019-01-23 RX ORDER — TOPIRAMATE 25 MG/1
75 TABLET, FILM COATED ORAL EVERY 12 HOURS SCHEDULED
Status: DISCONTINUED | OUTPATIENT
Start: 2019-01-23 | End: 2019-01-23 | Stop reason: HOSPADM

## 2019-01-23 RX ORDER — ACETAMINOPHEN 500 MG
1000 TABLET ORAL ONCE
Status: COMPLETED | OUTPATIENT
Start: 2019-01-23 | End: 2019-01-23

## 2019-01-23 RX ORDER — GABAPENTIN 800 MG/1
800 TABLET ORAL 3 TIMES DAILY
Status: DISCONTINUED | OUTPATIENT
Start: 2019-01-23 | End: 2019-01-23 | Stop reason: HOSPADM

## 2019-01-23 RX ORDER — GABAPENTIN 800 MG/1
800 TABLET ORAL 3 TIMES DAILY
Qty: 21 TABLET | Refills: 0 | Status: SHIPPED | OUTPATIENT
Start: 2019-01-23 | End: 2019-03-20

## 2019-01-23 RX ORDER — LOSARTAN POTASSIUM 25 MG/1
25 TABLET ORAL DAILY
Qty: 7 TABLET | Refills: 0 | Status: SHIPPED | OUTPATIENT
Start: 2019-01-23 | End: 2019-03-22

## 2019-01-23 RX ORDER — METOPROLOL SUCCINATE 25 MG/1
25 TABLET, EXTENDED RELEASE ORAL DAILY
Qty: 7 TABLET | Refills: 0 | Status: SHIPPED | OUTPATIENT
Start: 2019-01-23 | End: 2019-05-09

## 2019-01-23 RX ADMIN — GABAPENTIN 800 MG: 800 TABLET, FILM COATED ORAL at 08:55

## 2019-01-23 RX ADMIN — TOPIRAMATE 75 MG: 25 TABLET, FILM COATED ORAL at 08:55

## 2019-01-23 RX ADMIN — ACETAMINOPHEN 1000 MG: 500 TABLET ORAL at 11:36

## 2019-01-23 ASSESSMENT — ENCOUNTER SYMPTOMS
COUGH: 1
FEVER: 0
SHORTNESS OF BREATH: 1
WHEEZING: 1
HEADACHES: 1
ABDOMINAL PAIN: 0

## 2019-01-23 NOTE — TELEPHONE ENCOUNTER
Donato from [facility] ([phone]) calling regarding abnormal finding on 1/22/19 CXR.      Radiologist flagged for subtle airspace opacities.  No more information provided.

## 2019-01-23 NOTE — ED PROVIDER NOTES
[place] EMERGENCY DEPARTMENT ([facility])  January 22, 2019    History     Chief Complaint   Patient presents with     Hypertension     HPI  Lilliana Cardenas is a 63 year old homeless female with history notable for HTN, chronic pain syndrome, lower extremity neuropathy, asthma, and moderate major depression who presents from the shelter for hypertension.  Patient was noted to have BP of 200/107, so the nurse there called EMS.  Patient reports that she is going through a difficult time and cannot afford to pay for her prescriptions including her pain medications, antihypertensives, and antidepressants which has been an ongoing problem since the beginning of the year due to complications with her insurance coverage. She states she has already applied for Medicaid and is waiting for application to be processed.  Patient has been off of lisinopril for the past 5 days and finished her prednisone, 4 days ago for recent asthma exacerbation.  She is also supposed to be taking Metoprolol and Cozaar. She states that she only has her albuterol inhaler for her asthma.    She currently complains of some chest pain which she describes as pressure-like that started today and denies being given nitroglycerin by EMS.  She denies previous history of MI but did have a stroke back in 1999.  She also reports having worsening depression and suicidal ideation without plan.  She denies seeing anyone for therapy or psych.       PAST MEDICAL HISTORY  Past Medical History:   Diagnosis Date     Anemia      Anxiety      Chronic pain syndrome      Depressive disorder      Diabetes mellitus (H)      Hypertension      Lower extremity neuropathy      Overdose of opiate or related narcotic (H) 12/2011     PAST SURGICAL HISTORY  Past Surgical History:   Procedure Laterality Date     GYN SURGERY       FAMILY HISTORY  No family history on file.  SOCIAL HISTORY  Social History     Tobacco Use     Smoking status: Light Tobacco Smoker      Packs/day: 1.00     Years: 38.00     Pack years: 38.00     Smokeless tobacco: Never Used     Tobacco comment: pt states she has smoked on & off for since she was 18   Substance Use Topics     Alcohol use: No     MEDICATIONS  Current Facility-Administered Medications   Medication     metoprolol (LOPRESSOR) injection 5 mg     Current Outpatient Medications   Medication     albuterol (PROAIR HFA/PROVENTIL HFA/VENTOLIN HFA) 108 (90 Base) MCG/ACT inhaler     Carisoprodol 250 MG TABS     DULoxetine (CYMBALTA) 60 MG capsule     fluticasone-salmeterol (ADVAIR) 250-50 MCG/DOSE inhaler     gabapentin (NEURONTIN) 800 MG tablet     hydrOXYzine (ATARAX) 25 MG tablet     ketoprofen 10% in PLO 10% topical gel     lisinopril (PRINIVIL/ZESTRIL) 5 MG tablet     losartan (COZAAR) 25 MG tablet     metoprolol succinate ER (TOPROL-XL) 25 MG 24 hr tablet     omeprazole (PRILOSEC) 20 MG DR capsule     oxybutynin ER (DITROPAN-XL) 5 MG 24 hr tablet     oxyCODONE-acetaminophen (PERCOCET) 5-325 MG tablet     pramipexole (MIRAPEX) 0.125 MG tablet     predniSONE (DELTASONE) 20 MG tablet     rOPINIRole (REQUIP) 0.25 MG tablet     SUMAtriptan (IMITREX) 50 MG tablet     topiramate (TOPAMAX) 25 MG tablet     traZODone (DESYREL) 50 MG tablet     Facility-Administered Medications Ordered in Other Encounters   Medication     iohexol (OMNIPAQUE) 300 mg/mL injection 10 mL     ALLERGIES  Allergies   Allergen Reactions     Prednisone      Pain         I have reviewed the Medications, Allergies, Past Medical and Surgical History, and Social History in the Epic system.    Review of Systems   Constitutional: Negative for fever.   Eyes: Positive for visual disturbance.   Respiratory: Positive for cough, shortness of breath and wheezing.    Cardiovascular: Positive for chest pain.   Gastrointestinal: Negative for abdominal pain.   Neurological: Positive for headaches.   Psychiatric/Behavioral: Positive for dysphoric mood and suicidal ideas.   All other  systems reviewed and are negative.      Physical Exam   BP: (!) 170/103  Pulse: 108  Heart Rate: 103  Temp: 98.3  F (36.8  C)  Resp: 28  Weight: 79.4 kg (175 lb)  SpO2: 93 %      Physical Exam   Constitutional:   Anxious appearing   HENT:   Head: Atraumatic.   Mouth/Throat: Oropharynx is clear and moist. No oropharyngeal exudate.   Eyes: Pupils are equal, round, and reactive to light. No scleral icterus.   Cardiovascular: Normal heart sounds and intact distal pulses.   Pulmonary/Chest: Breath sounds normal. No respiratory distress.   Abdominal: Soft. Bowel sounds are normal. There is no tenderness.   Musculoskeletal: She exhibits no edema or tenderness.   Skin: Skin is warm. No rash noted. She is not diaphoretic.   Psychiatric: Her mood appears anxious. She exhibits a depressed mood. She expresses suicidal ( States she has been depressed for several months with thoughts of suicide) ideation. She expresses no suicidal plans.       ED Course        Procedures   7:23 PM  The patient was seen and examined by Dr. Delgado in Room 9.                EKG Interpretation:      Interpreted by Steve Delgado  Time reviewed: 2220  Symptoms at time of EKG: Resolved chest pain  Rhythm: Sinus tachycardia  Rate: 107  Axis: normal  Ectopy: none  Conduction: normal  ST Segments/ T Waves: No ST-T wave changes  Q Waves: none  Comparison to prior: Unchanged    Clinical Impression: normal EKG          Critical Care time:  none             Labs Ordered and Resulted from Time of ED Arrival Up to the Time of Departure from the ED   CBC WITH PLATELETS DIFFERENTIAL - Abnormal; Notable for the following components:       Result Value    WBC 12.0 (*)     All other components within normal limits   BASIC METABOLIC PANEL - Abnormal; Notable for the following components:    Calcium 8.4 (*)     All other components within normal limits   TROPONIN I   PERIPHERAL IV CATHETER            Assessments & Plan (with Medical Decision Making)   The  patient has untreated hypertension and has been off of her medication for several weeks.  She was sent for evaluation of her blood pressure of 210/110.  Her blood pressure is already improved spontaneously on arrival here.  It did seem to fluctuate and had periods where it went quite high, likely secondary to stress.  Her chest pain was resolved and her EKG and troponin were normal.  I do not think this was an ischemic cardiac event.  She was given her medications.  She was recently transitioned from lisinopril to Cozaar as she felt that the lisinopril was giving her cough.  She was given both her Toprol-XL and lisinopril and initially metoprolol was ordered but not given as her blood pressure spontaneously improved.  She was given a neb for her ongoing wheezing did improve.  I spoke with the  as the patient has passive suicidal thoughts.  She primarily states that she wants a place to stay tonight.  The  was not able to arrange for placement tonight because she missed the deadline at [address].  She did think that in the morning we could get a short course of medications to hold the patient through and help in placing her.  The patient was assessed by the video deck  who did not feel the patient is a risk.  They will attempt to schedule psychiatric follow-up as she does have depression.  She is able to contract for safety and will be observed in the ED tonight as it is quite cold outside and discharged in the morning.  She understands that she will be discharged as she does not meet criteria for admission.    I have reviewed the nursing notes.    I have reviewed the findings, diagnosis, plan and need for follow up with the patient.       Medication List      There are no discharge medications for this visit.         Final diagnoses:   Essential hypertension     I, Wesley Simental, am serving as a trained medical scribe to document services personally performed by Steve Delgado  MD, based on the provider's statements to me.      I, Steve Delgado MD, was physically present and have reviewed and verified the accuracy of this note documented by Wesley Simental.     1/22/2019   [facility], [place], EMERGENCY DEPARTMENT     Steve Delgado MD  01/23/19 0110       Steve Delgado MD  01/23/19 0111  Daniel Hanley MD  01/23/19 1315

## 2019-01-23 NOTE — ED NOTES
"Patient claims to continue to be suicidal:  \"I lost my house in October, 2018, and I just want to die.\"  MD notified.  Med's ordered for her cramping pain in her feet.  Menue given for her breakfast meal.  Will continue to monitor.  "

## 2019-01-23 NOTE — ED TRIAGE NOTES
"Pt BIBA with c/o chest pain, and hypertension. Pt reports that she is homeless and has not had access to home medications. Pt is prescribed BP medications. Pt also notes that she is going through a \"crisis\" right now in her life. Reports suicidal ideation, denies plan.   "

## 2019-01-23 NOTE — PROGRESS NOTES
"Emergency Social Work Services Note    Date of  Intervention: 01/23/19  Last Emergency Department Visit: Has been boarding in the ED since yesterday evening  Care Plan:  none  Collaborated with: ED MD, ED RN, [facility] shelter staff, patient    Data:  Ayaka continues to board in the ED since yesterday evening.  She has been medically cleared and had a DEC crisis assessment. Per DEC assessment, Ayaka was able to contract for safety stating, \"I'm really not suicidal. I can contract for safety.\"  She is not requiring psychiatric hospitalization at this time.     Intervention:  Per KRANTHI discussion with Ayaka yesterday evening, her only discharge option is to return to [facility] for placement.  She denies any friends or family members who would be able to house her at this time.     KRANTHI met again with Ayaka this morning. Ayaka continues to express suicidality and reports she wants psychiatric hospitalization. We reviewed DEC assessment and discussed findings and recommendations. Ayaka appeared distressed and stated, \"I am suicidal. They (DEC ) told me that if I said I wasn't suicidal that the doctor would give me my meds.\"  We discussed whether her feelings of suicidality are related to her housing/psychosocial social concerns.  She denied although perseverated on the fact that she feels that no one is helping her.     KRANTHI offered to provide 1 week medically necessary medications as prescribed by ED MD.  Ayaka requested several specific medications but deferred this to ED MD and her follow-up in clinic.  She also showed ED SW several large Ziploc bags full of medications.  Inquired about discrepancy of her saying she had no medications but also had bags full of them, she stated \"it is complicated.\"  KRANTHI offered to facilitate transportation back to [facility] for her to work with her staff on medical respite bed and other resources.  She declined and stated she would rather " go to [address] [place] [address] to get her wheelchair and talk to adult mental health crisis staff.  ED SW did receive confirmation back from Kinza, RN with health care for the homeless who confirmed that Ayaka does have a bed reservation at [place] [address] [address].    Updated ED MD regarding continued suicidal statements.  DEC reassessed prior to discharge from ED setting.  Ayaka continue to ask express suicidal ideation related to housing concerns, refusing to leave ED setting stating she will take pills and has taken some pills already. She refused to the see MD at this point and was notified that security would need to escort her from ED setting.  She does not require psychiatric hospitalization and has not been deemed to be a danger to herself or others at this point. She appears to be suffering chronic stressors related to housing concerns so will need to continue her collaboration with outpatient community providers.     Post DEC assessment recommendation is crisis bed options. DEC was paged to work on this however we have not yet had an update. ED SW contacted crisis beds and Ayaka will need to follow-up from shelter setting.    [facility]  [facility]  [address] [place] [address]   (p) [phone]   - 3 flights of stairs, so not appropriate placement for patient.    [facility]  [address] [place] [address]   (p) [phone]   - FULL    [facility]  (p) [phone]  - perhaps later today, patient will need to call    Assessment:  Homelessness, history of mental health issues    Plan:    Anticipated Disposition:  Homeless shelter    Barriers to d/c plan:  none    Follow Up: Ayaka will need to follow up with shelter connect staff for transitional housing and crisis beds.  ED SW will provide voucher ([employer] confirmation#[id number]) to cover discharge medications for 1 week supply.   Arranged taxi cab transportation to get [address] per her request (she reports wheelchair is located there).  KRANTHI has left voicemail for shelter staff including healthcare for the homeless liaison and medical respite bed program nursing staff with updates throughout the day. Ayaka's AVS updated with all resources incl crisis beds, mental health hotline and shelter staff.     Community resources  [facility] care for the homeless GILMER Echols (p) [phone]  [address] Shelter Team (P) [phone]   Clara (p) [phone] -no answer, goes to generic voicemail.    FLIP Benavides, [facility]  Social Work Services, Emergency Dept [facility]  Pager: [phone] Mon-Sat 9 am - 9 pm, on-call/after hours pager [phone]

## 2019-01-23 NOTE — PROGRESS NOTES
"Emergency Social Work Services Note    Date of  Intervention: 01/22/19  Last Emergency Department Visit:  01/11/2019 [facility]  Care Plan: none  Collaborated with: ED MD, ED RN, chart review, patient    Data:  Lilliana \"Ayaka\" Theresa is a 63-year-old female who has been homeless for the past 3 months.  She was at [facility] earlier today, [facility] when they sent her to [facility] ED due to hypertension.      Ayaka was recently at [facility] ED 1/11/19, seen by ED SW for assessment and resources.  At that time, she had been staying at [facility] in [place] with a plan to move to a [facility].  She expressed reluctance to return to shelter setting and was hopeful that her  may provide hotel voucher for her.     Intervention: ED SW met with Ayaka to discuss her situation.  She reports she is now at [facility] but believes that she does not have a bed this evening and does not wish to return there.  She expresses hopelessness regarding her situation and verbalizes that if she has to return to the shelter, \"she will shoot herself\".  She acknowledges that she does not have any other options but regardless declining to return to [facility].  She reports she does have Social Security income about $1037/month but notes does not have any money at current and does not receive her next check until next week.  She reports she does not have any housing lined up nor ability to pay for hotel.  Ayaka endorsed hopelessness and dissatisfaction with shelter system, feels that \"no-one is helping me.\"  She reference past psychiatric hospitalization and again referenced feelings of suicidal ideation around being homeless. SW attempted to problem solve re shelter bed this evening, rx's, contacting  and reaching out to her sister for emotional support. Ayaka appeared ambivalent, difficult to engage.    Assessment:  " Homelessness, history of mental health issues    Plan:    Anticipated Disposition:  homeless shelter    Barriers to d/c plan:  Ayaka declining shelter placement, expressing hopelessness     Follow Up: If Ayaka medically stable for discharge from ED setting this evening, she may return to [facility] this evening before 22:15. She will need to call shelter connect. Follow-up with her  Clara. Offered bus tokens which she declined and told  that she was unable to walk.  Per ED RN, she has been able to ambulate to and from the bathroom.    [facility] Shelter Team (P) [phone]  [address] (for intake only)  Mon-Fri 9 am - 5:30 pm, Sat/Sun 1-5:30 pm  After hours, call [facility] 2-1-1 or (p) [phone]     Clara (p) [phone]    NUBIA Benavides, [facility]  Social Work Services, Emergency Dept [facility]  Pager: [phone] Mon-Sat 9 am - 9 pm, on-call/after hours pager [phone]

## 2019-01-23 NOTE — DISCHARGE INSTRUCTIONS
[facility] Shelter Team (P) [phone]  [address]  Mon-Fri 9 am - 5:30 pm, Sat/Sun 1-5:30 pm  After hours, call [facility] 2-1-1 or (p) [phone]    Adult Mental Health Crisis (p) [phone]  Crisis Beds  [facility]ickie [facility]  [employer]  [address] [place] [address]   (p) [phone]     Anel Diaz [facility]  [employer]  [address] [place] [address]   (p) [phone]     [facility]  (p) [phone]

## 2019-01-23 NOTE — ED NOTES
3:31 PM  Patient was signed out to me as pending placement after receiving medications this morning.  Medications were obtained for a week.  Patient then stated that she was suicidal.  Further clarification revealed that she was suicidal because she did not have a home.  She stated she would not be suicidal if she did have a permanent home.  The case was discussed at length with psychiatrist on call after a second behavioral assessment was obtained.  Patient has secondary gain issues.  She was told that her symptoms would not require an admission.  Nurse care coordinator was actively involved for arranging transportation and housing.  Patient was provided with multiple resources for her shelter including crisis housing and [facility] where she came from yesterday evening.  I have also recommended follow-up with her primary physician.     Daniel Rivera MD  01/23/19 1696

## 2019-01-23 NOTE — ED NOTES
"Per ED-Attending, DEC  has consulted a pyschiatrist, who deems patient NOT suicidal; psychiatrist requesting that the DEC  be re-contacted to set-up a crisis bed a Anel Aj or Vickie Skinner RN to contact the DEC  (or ED ) to work on this.  ED-MD aware.    Also, patient requesting tylenol:  \"My head is stuffed up.\"  "

## 2019-01-24 ENCOUNTER — TELEPHONE (OUTPATIENT)
Dept: CARE COORDINATION | Facility: CLINIC | Age: 64
End: 2019-01-24

## 2019-01-24 NOTE — TELEPHONE ENCOUNTER
Emergency Social Work Services Note    Date of  Intervention: 01/24/19  Last Emergency Department Visit:  1/23/19   Care Plan:  no    Data:  Received follow up call from Kin[facility]note 3[facility] for the Homeless Lialuis daniel at [facility] ([phone]) inquiring about disposition status for patient.  They were anticipating patient arrival to fill medical respite bed, which was being held for her.  Patient did not arrive yesterday and they have been unable to locate patient.      Intervention:  Provided update re: final disposition as documented yesterday.        Plan:    Follow Up:  KRANTHI will contact Kinza today if patient returns to the ED.      OMAIRA David, RN  Social Work Services, Emergency Dept [facility]  Pager: [phone] Mon-Sat 9 am - 9 pm, on-call/after hours pager [phone]

## 2019-02-01 DIAGNOSIS — F51.04 PSYCHOPHYSIOLOGICAL INSOMNIA: ICD-10-CM

## 2019-02-01 DIAGNOSIS — F32.1 MODERATE MAJOR DEPRESSION (H): ICD-10-CM

## 2019-02-01 DIAGNOSIS — G89.4 CHRONIC PAIN SYNDROME: ICD-10-CM

## 2019-02-01 NOTE — TELEPHONE ENCOUNTER
"[email or web address].rxdequested Prescriptions   Pending Prescriptions Disp Refills     rOPINIRole (REQUIP) 0.25 MG tablet [Pharmacy Med Name: ROPINIROLE HCL 0.25 MG TABLET] 270 tablet 0    Last Written Prescription Date:  01/14/2019  Last Fill Quantity: 270,  # refills: 0   Last office visit: 1/17/2019 with prescribing provider:     Future Office Visit:   Sig: TAKE 3 TABLETS BY MOUTH DAILY AT BEDTIME    Antiparkinson's Agents Protocol Failed - 2/1/2019  1:32 PM       Failed - Blood pressure under 140/90 in past 12 months    BP Readings from Last 3 Encounters:   01/23/19 (!) 165/94   01/17/19 116/75   01/17/19 112/58                Passed - CBC on record in past 12 months    Recent Labs   Lab Test 01/22/19 2007   WBC 12.0*   RBC 4.05   HGB 11.9   HCT 37.3                   Passed - ALT on record in past 12 months        Recent Labs   Lab Test 11/25/18  0805   ALT 17            Passed - Serum Creatinine on file in past 12 months    Recent Labs   Lab Test 01/22/19 2007   CR 0.80            Passed - Medication is active on med list       Passed - Patient is age 18 or older       Passed - No active pregnancy on record       Passed - No positive pregnancy test in the past 12 months       Passed - Recent (6 mo) or future (30 days) visit within the authorizing provider's specialty    Patient had office visit in the last 6 months or has a visit in the next 30 days with authorizing provider or within the authorizing provider's specialty.  See \"Patient Info\" tab in inbasket, or \"Choose Columns\" in Meds & Orders section of the refill encounter.            "

## 2019-02-01 NOTE — TELEPHONE ENCOUNTER
"Requested Prescriptions   Pending Prescriptions Disp Refills     hydrOXYzine (ATARAX) 25 MG tablet [Pharmacy Med Name: HYDROXYZINE HCL 25 MG TABLET] 120 tablet 0    Last Written Prescription Date:  10/19/2018  Last Fill Quantity: 120,  # refills: 1   Last office visit: 1/17/2019 with prescribing provider:     Future Office Visit:   Sig: TAKE 1 TABLET BY MOUTH FOUR TIMES A DAY AS NEEDED FOR ITCHING    Antihistamines Protocol Passed - 2/1/2019  1:01 PM       Passed - Recent (12 mo) or future (30 days) visit within the authorizing provider's specialty    Patient had office visit in the last 12 months or has a visit in the next 30 days with authorizing provider or within the authorizing provider's specialty.  See \"Patient Info\" tab in inbasket, or \"Choose Columns\" in Meds & Orders section of the refill encounter.             Passed - Patient is age 3 or older    Apply age and/or weight-based dosing for peds patients age 3 and older.    Forward request to provider for patients under the age of 3.         Passed - Medication is active on med list        "

## 2019-02-01 NOTE — TELEPHONE ENCOUNTER
"Requested Prescriptions   Pending Prescriptions Disp Refills     traZODone (DESYREL) 50 MG tablet [Pharmacy Med Name: TRAZODONE 50 MG TABLET] 30 tablet 0    Last Written Prescription Date:  11/29/2018  Last Fill Quantity: 30,  # refills: 0   Last office visit: 1/17/2019 with prescribing provider:     Future Office Visit:   Sig: TAKE 1 TABLET BY MOUTH NIGHTLY AS NEEDED FOR SLEEP    Serotonin Modulators Passed - 2/1/2019  1:01 PM       Passed - Recent (12 mo) or future (30 days) visit within the authorizing provider's specialty    Patient had office visit in the last 12 months or has a visit in the next 30 days with authorizing provider or within the authorizing provider's specialty.  See \"Patient Info\" tab in inbasket, or \"Choose Columns\" in Meds & Orders section of the refill encounter.             Passed - Medication is active on med list       Passed - Patient is age 18 or older       Passed - No active pregnancy on record       Passed - No positive pregnancy test in past 12 months        "

## 2019-02-04 RX ORDER — TRAZODONE HYDROCHLORIDE 50 MG/1
TABLET, FILM COATED ORAL
Qty: 30 TABLET | Refills: 0 | Status: SHIPPED | OUTPATIENT
Start: 2019-02-04 | End: 2019-04-10

## 2019-02-04 NOTE — TELEPHONE ENCOUNTER
Prescription approved per [facility] Refill Protocol.    Nicole Jama RN   [facility]

## 2019-02-05 RX ORDER — HYDROXYZINE HYDROCHLORIDE 25 MG/1
TABLET, FILM COATED ORAL
Qty: 120 TABLET | Refills: 1 | Status: SHIPPED | OUTPATIENT
Start: 2019-02-05 | End: 2019-12-12

## 2019-02-05 RX ORDER — ROPINIROLE 0.25 MG/1
TABLET, FILM COATED ORAL
Qty: 270 TABLET | Refills: 0 | Status: SHIPPED | OUTPATIENT
Start: 2019-02-05 | End: 2019-03-20

## 2019-02-05 NOTE — TELEPHONE ENCOUNTER
Routing refill request to provider for review/approval because:  Blood pressure out of range.

## 2019-02-13 ENCOUNTER — TELEPHONE (OUTPATIENT)
Dept: PALLIATIVE MEDICINE | Facility: CLINIC | Age: 64
End: 2019-02-13

## 2019-02-13 DIAGNOSIS — G89.4 CHRONIC PAIN SYNDROME: ICD-10-CM

## 2019-02-13 NOTE — TELEPHONE ENCOUNTER
Reason for call:  Medication   If this is a refill request, has the caller requested the refill from the pharmacy already? No  Will the patient be using a [facility]? No  Name of the pharmacy and phone number for the current request: Pt will  from [facility]    Name of the medication requested: oxyCODONE-acetaminophen (PERCOCET) 5-325 MG tablet    Other request: Call when ready    Phone number to reach patient:  Home number on file [phone] (home)    Best Time:  n/a    Can we leave a detailed message on this number?  YES       Filomena WATSON    [facility] [place]

## 2019-02-14 ENCOUNTER — OFFICE VISIT (OUTPATIENT)
Dept: INTERNAL MEDICINE | Facility: CLINIC | Age: 64
End: 2019-02-14
Payer: COMMERCIAL

## 2019-02-14 VITALS
WEIGHT: 177.6 LBS | DIASTOLIC BLOOD PRESSURE: 86 MMHG | HEART RATE: 103 BPM | RESPIRATION RATE: 16 BRPM | OXYGEN SATURATION: 99 % | TEMPERATURE: 99.1 F | SYSTOLIC BLOOD PRESSURE: 152 MMHG | HEIGHT: 62 IN | BODY MASS INDEX: 32.68 KG/M2

## 2019-02-14 DIAGNOSIS — G89.4 CHRONIC PAIN SYNDROME: Primary | ICD-10-CM

## 2019-02-14 PROCEDURE — 99213 OFFICE O/P EST LOW 20 MIN: CPT | Performed by: NURSE PRACTITIONER

## 2019-02-14 RX ORDER — ROPINIROLE 1 MG/1
1 TABLET, FILM COATED ORAL 3 TIMES DAILY
Qty: 90 TABLET | Refills: 3 | Status: SHIPPED | OUTPATIENT
Start: 2019-02-14 | End: 2019-12-17

## 2019-02-14 RX ORDER — OXYCODONE AND ACETAMINOPHEN 5; 325 MG/1; MG/1
TABLET ORAL
Qty: 90 TABLET | Refills: 0 | Status: SHIPPED | OUTPATIENT
Start: 2019-03-04 | End: 2019-03-20

## 2019-02-14 RX ORDER — GABAPENTIN 300 MG/1
300 CAPSULE ORAL AT BEDTIME
Qty: 90 CAPSULE | Refills: 3 | Status: SHIPPED | OUTPATIENT
Start: 2019-02-14 | End: 2020-01-06

## 2019-02-14 ASSESSMENT — MIFFLIN-ST. JEOR: SCORE: 1323.84

## 2019-02-14 NOTE — TELEPHONE ENCOUNTER
Called pt. And informed her that her script was here at [facility] up front. She was told to make an appt. With Dr. Sanchez in 1 month.  Galilea JAUREGUI LPN  Pain Management   [id number]  [address]  [place] [address]

## 2019-02-14 NOTE — TELEPHONE ENCOUNTER
Patient requesting refill(s) of oxyCODONE-acetaminophen (PERCOCET) 5-325 MG per tablet      Last picked up from pharmacy on 2/2/2019  [id number] [place] [facility] [place], [place] - [address] [place] [address]  Phone: [phone] Fax: [phone]    Pt last seen by prescribing provider on 1/17/2019  Next appt scheduled for none       checked in the past 6 months? Yes If no, print current report and give to RN     Last urine drug screen date 11/19/2018  Current opioid agreement on file (completed within the last year) YES Date of opioid agreement: 11/19/2018     Processing: Patient will  at [facility]     Call her at: [phone] when ready     Will route to nursing pool for review and preparation of prescription(s).

## 2019-02-14 NOTE — TELEPHONE ENCOUNTER
Routing to provider to review script prepped per below    Percocet 5-325, #90, Refill:no  Si-2 tabs every 6 hours as needed, maximum 3 tabs daily. Ok to dispense on 19 and start 19  Last picked up 19 with start 19  Due: 19    Confirmed with pharmacy  19 for #90.   Called and LM for patient to call back to discuss as this is early request for refill.     Leona ROHTMANN, RN Care Coordinator  [facility]

## 2019-02-14 NOTE — TELEPHONE ENCOUNTER
Writer talked to Pt.  Writer reviewed last Oxycodone Rx.        Pt stated that she picked up on the 15th of January and paid for the medication on the second of February.  Writer called the pharmacy back and verified that this is the case.  Reviewed MNPMP, MNPMP also shows this.      Processing: Patient will  at [facility]     Call her at: [phone] when ready    Rx changed to start date of 2/14/19.      Vladimir Gamez, RN  Care Coordinator   [facility] [place]

## 2019-02-14 NOTE — PROGRESS NOTES
SUBJECTIVE:   Lilliana Cardenas is a 63 year old female who presents to clinic today for the following health issues:          Hospital Follow-up Visit:    Hospital/Nursing Home/IP Rehab Facility: [facility]  Date of Admission: 01/23/19  Date of Discharge: 02/01/19  Reason(s) for Admission: chest pain and suicide ideation            Problems taking medications regularly:  None       Medication changes since discharge: None       Problems adhering to non-medication therapy:  None    Summary of hospitalization:  CareEverywhere information obtained and reviewed  Diagnostic Tests/Treatments reviewed.  Follow up needed: none  Other Healthcare Providers Involved in Patient s Care:         None  Update since discharge: stable.     Post Discharge Medication Reconciliation: discharge medications reconciled and changed, per note/orders (see AVS).  Plan of care communicated with patient     Coding guidelines for this visit:  Type of Medical   Decision Making Face-to-Face Visit       within 7 Days of discharge Face-to-Face Visit        within 14 days of discharge   Moderate Complexity [id number]   High Complexity [id number]                  Problem list and histories reviewed & adjusted, as indicated.  Additional history: as documented    Patient Active Problem List   Diagnosis     Chronic pain syndrome     Chronic low back pain     Opiate dependence (H)     Overdose     Moderate major depression (H)     Depression     Spondylosis of lumbar region without myelopathy or radiculopathy     Past Surgical History:   Procedure Laterality Date     GYN SURGERY         Social History     Tobacco Use     Smoking status: Light Tobacco Smoker     Packs/day: 1.00     Years: 38.00     Pack years: 38.00     Smokeless tobacco: Never Used     Tobacco comment: pt states she has smoked on & off for since she was 18   Substance Use Topics     Alcohol use: No     History reviewed. No pertinent family history.      Current Outpatient  Medications   Medication Sig Dispense Refill     albuterol (PROAIR HFA/PROVENTIL HFA/VENTOLIN HFA) 108 (90 Base) MCG/ACT inhaler Inhale 2 puffs into the lungs 4 times daily as needed for other (dyspnea) 1 Inhaler 1     Carisoprodol 250 MG TABS Take 250 mg by mouth 4 times daily as needed 16 tablet 0     DULoxetine (CYMBALTA) 60 MG capsule Take 1 capsule (60 mg) by mouth daily 90 capsule 3     fluticasone-salmeterol (ADVAIR) 250-50 MCG/DOSE inhaler Inhale 1 puff into the lungs every 12 hours 1 Inhaler 3     gabapentin (NEURONTIN) 300 MG capsule Take 1 capsule (300 mg) by mouth At Bedtime 90 capsule 3     gabapentin (NEURONTIN) 800 MG tablet Take 1 tablet (800 mg) by mouth 3 times daily 21 tablet 0     hydrOXYzine (ATARAX) 25 MG tablet TAKE 1 TABLET BY MOUTH FOUR TIMES A DAY AS NEEDED FOR ITCHING 120 tablet 1     ketoprofen 10% in PLO 10% topical gel Place 2-4 g onto the skin every 4 hours as needed for moderate pain 200 g 1     losartan (COZAAR) 25 MG tablet Take 1 tablet (25 mg) by mouth daily 7 tablet 0     metoprolol succinate ER (TOPROL-XL) 25 MG 24 hr tablet Take 1 tablet (25 mg) by mouth daily 7 tablet 0     omeprazole (PRILOSEC) 20 MG DR capsule Take 20 mg by mouth daily       oxybutynin ER (DITROPAN-XL) 5 MG 24 hr tablet Take 1 tablet (5 mg) by mouth daily 90 tablet 3     oxyCODONE-acetaminophen (PERCOCET) 5-325 MG tablet 1-2 tabs every 6 hours as needed, maximum three tablets daily. Ok to dispense on 1/14/19 and start 1/14/19 90 tablet 0     pramipexole (MIRAPEX) 0.125 MG tablet Take 1 tablet (0.125 mg) by mouth At Bedtime 90 tablet 0     predniSONE (DELTASONE) 20 MG tablet Take two tablets (= 40mg) each day for 5 (five) days. 10 tablet 0     rOPINIRole (REQUIP) 0.25 MG tablet TAKE 3 TABLETS BY MOUTH DAILY AT BEDTIME 270 tablet 0     rOPINIRole (REQUIP) 0.25 MG tablet Take 3 tablets (0.75 mg) by mouth At Bedtime 270 tablet 0     rOPINIRole (REQUIP) 1 MG tablet Take 1 tablet (1 mg) by mouth 3 times daily 90  "tablet 3     SUMAtriptan (IMITREX) 50 MG tablet TAKE 1 TAB AT ONSET OF MIGRAINE. MAY REPEAT AFTER 2 HRS. MAX 2 TABS/DAY 21 tablet 0     traZODone (DESYREL) 50 MG tablet TAKE 1 TABLET BY MOUTH NIGHTLY AS NEEDED FOR SLEEP 30 tablet 0     lisinopril (PRINIVIL/ZESTRIL) 5 MG tablet Take 1 tablet (5 mg) by mouth daily 30 tablet 0     topiramate (TOPAMAX) 25 MG tablet Take 3 tablets (75 mg) by mouth 3 times daily 270 tablet 0     BP Readings from Last 3 Encounters:   02/14/19 152/86   01/23/19 (!) 165/94   01/17/19 116/75    Wt Readings from Last 3 Encounters:   02/14/19 80.6 kg (177 lb 9.6 oz)   01/22/19 79.4 kg (175 lb)   01/17/19 79.4 kg (175 lb 1.6 oz)                    Reviewed and updated as needed this visit by clinical staff  Tobacco  Allergies  Meds  Med Hx  Surg Hx  Fam Hx  Soc Hx      Reviewed and updated as needed this visit by Provider         ROS:  Constitutional, HEENT, cardiovascular, pulmonary, gi and gu systems are negative, except as otherwise noted.    OBJECTIVE:     /86 (BP Location: Right arm, Patient Position: Sitting, Cuff Size: Adult Large)   Pulse 103   Temp 99.1  F (37.3  C) (Oral)   Resp 16   Ht 1.575 m (5' 2\")   Wt 80.6 kg (177 lb 9.6 oz)   SpO2 99%   BMI 32.48 kg/m    Body mass index is 32.48 kg/m .  GENERAL: alert, no distress and frail  PSYCH: tangential and affect normal/bright        ASSESSMENT/PLAN:               ICD-10-CM    1. Chronic pain syndrome G89.4 gabapentin (NEURONTIN) 300 MG capsule     rOPINIRole (REQUIP) 1 MG tablet       Dose updates, refills requested  F/u 3 months    Bibi Chau NP  [facility]    "

## 2019-02-15 NOTE — TELEPHONE ENCOUNTER
Chart check: appointment scheduled 02/25/19. Closing.    Leona ROTHMANN, RN Care Coordinator  [facility]

## 2019-03-04 DIAGNOSIS — G89.4 CHRONIC PAIN SYNDROME: ICD-10-CM

## 2019-03-04 NOTE — TELEPHONE ENCOUNTER
"Requested Prescriptions   Pending Prescriptions Disp Refills     SUMAtriptan (IMITREX) 50 MG tablet  Last Written Prescription Date:  12/27/18  Last Fill Quantity: 21,  # refills: 0   Last office visit: 2/14/2019 with prescribing provider: Kandi   Future Office Visit: none     21 tablet 0    Serotonin Agonists Failed - 3/4/2019  2:30 PM       Failed - Blood pressure under 140/90 in past 12 months    BP Readings from Last 3 Encounters:   02/14/19 152/86   01/23/19 (!) 165/94   01/17/19 116/75                Failed - Serotonin Agonist request needs review.    Please review patient's record. If patient has had 8 or more treatments in the past month, please forward to provider.         Passed - Recent (12 mo) or future (30 days) visit within the authorizing provider's specialty    Patient had office visit in the last 12 months or has a visit in the next 30 days with authorizing provider or within the authorizing provider's specialty.  See \"Patient Info\" tab in inbasket, or \"Choose Columns\" in Meds & Orders section of the refill encounter.             Passed - Medication is active on med list       Passed - Patient is age 18 or older       Passed - No active pregnancy on record       Passed - No positive pregnancy test in past 12 months          "

## 2019-03-07 RX ORDER — SUMATRIPTAN 50 MG/1
TABLET, FILM COATED ORAL
Qty: 21 TABLET | Refills: 0 | Status: SHIPPED | OUTPATIENT
Start: 2019-03-07 | End: 2019-12-17

## 2019-03-07 NOTE — TELEPHONE ENCOUNTER
Routing refill request to provider for review/approval because:  BP Readings from Last 3 Encounters:   02/14/19 152/86   01/23/19 (!) 165/94   01/17/19 116/75

## 2019-03-14 ENCOUNTER — TELEPHONE (OUTPATIENT)
Dept: INTERNAL MEDICINE | Facility: CLINIC | Age: 64
End: 2019-03-14

## 2019-03-14 NOTE — TELEPHONE ENCOUNTER
[facility] sent fax stating patient is requesting new prescription for Dicyclomine 20 mg tablets. Dicyclomine is not on medication list.     Per chart review, Dicyclomine was ordered by ER on 10/3/18 with directions take 1 tablet BID for 10 days. Per ER note this was ordered with Zofran and Keflex for possible LLE cellulitis.     Attempted to contact patient to discuss why she is requesting this medication. Left voice message to call back.

## 2019-03-19 NOTE — PROGRESS NOTES
[facility]    Date of visit: 3/20/19    Chief complaint:   Chief Complaint   Patient presents with     Pain       Interval history:  Lilliana Cardenas is a 63 year old female last seen by me on 1/17/19.      Recommendations/plan at the last visit included:  1. Physical Therapy: Chronic pain PT referral placed. She wasn't able to make the last appointment due to her living situation.  2. Clinical Health Pain Psychologist: Would certainly benefit from biofeed back/coping strategies. Recently started working with a psychologist on stress management. Will place referral to pain phd once she has completed this.  3. Self Care Recommendations: Gentle progressive exercise that does not increase pain - gradually increase daily walking program.  Take mini breaks - 5 minutes of mindfullness a couple times a day.   4. Diagnostic Studies: none at this time  5. Workup - Bilateral knee XR ordered today.  6. Medication Management:   1. Oxycodone 5/325 every 6 hours prn - max 3/day.  2. Recommended against using carisoprodol, she has not refilled this medication.  3. Continue duloxetine 60mg daily.  4. Continue Gabapentin 800mg TID  5. Topamax 75mg TID  6. Sumatriptan 50mg prn  7. Further procedures recommended:   1. Bilateral greater trochanter bursa injections performed in clinic on 10/23/18, can repeat this in the future if needed.  2. Caudal epidural with significant relief, can be repeated every 3 months as needed.  3. Consider bilateral knee injection in the future, consider synvisc use instead of steroid to decrease steroid burden.  8. Referrals: none  9. Follow up: 4 weeks in clinic       Since her last visit, Lilliana Cardenas reports:  -She was hospitalized for chest pain and suicidal ideation. This is why she didn't make her last appointment.  -There were some medication changes at that visit including increasing night dose of gabapentin and they re-prescribed  carisoprodol.  -Discussed that I would like her to stop taking carisoprodol and switch to a different muscle relaxant.  -She continues to stay in a shelter. They stay in various churches for 2 week intervals, she has moved from [place] to [place] and is now in [place].  -She has been following with a psychiatrist in [place], they may prescribe some antidepressants for her.  -She has been sleeping in her chair a lot and her bursa pain has been flaring over the past few weeks. Back and leg pain has been present as well but the bursa pain is the worst.        Pain scores:  Pain intensity on average is 8 on a scale of 0-10.     Current pain treatments:   -Hydroxyzine 25 mg QID prn  -Gabapentin 800mg TID  -Trazodone 50mg hs prn (3-4 times/week)  -Imitrex 50mg prn  -Cymbalta 60mg qd  -Percocet 1-2 5/235 tablets q6h prn (takes 2 tablets/day)  -Topiramate 75mg TID    Past pain treatments:  Medications:  -Carisoprodol 250mg QID prn    2. Physical Therapy: last PT was 5+ years ago                TENS unit: helps a little, doesn't have it with her.   3. Pain psychology: did this at [facility] was helpful  4. Surgery: SCSx2, lumbar surgeries x5 with decompression and fusion in 2009  5. Injections: epidurals were helpful, last was 4 years ago  6. Alternative Therapies:               Chiropractic: didn't like               Acupuncture: helpful           Side Effects: no side effect    Medications:  Current Outpatient Medications   Medication Sig Dispense Refill     DULoxetine (CYMBALTA) 60 MG capsule Take 1 capsule (60 mg) by mouth daily 90 capsule 3     gabapentin (NEURONTIN) 300 MG capsule Take 1 capsule (300 mg) by mouth At Bedtime 90 capsule 3     gabapentin (NEURONTIN) 600 MG tablet Take 2 tablets (1,200 mg) by mouth 3 times daily 180 tablet 3     methocarbamol (ROBAXIN) 500 MG tablet Take 1 tablet (500 mg) by mouth 3 times daily as needed for muscle spasms 90 tablet 1     order for DME Equipment being ordered: four  wheeled walker with chair 1 Units 0     oxyCODONE-acetaminophen (PERCOCET) 5-325 MG tablet 1 tabs every 6 hours as needed, maximum 3 tabs daily. 90 tablet 0     traZODone (DESYREL) 50 MG tablet TAKE 1 TABLET BY MOUTH NIGHTLY AS NEEDED FOR SLEEP 30 tablet 0     albuterol (PROAIR HFA/PROVENTIL HFA/VENTOLIN HFA) 108 (90 Base) MCG/ACT inhaler Inhale 2 puffs into the lungs 4 times daily as needed for other (dyspnea) 1 Inhaler 1     Carisoprodol 250 MG TABS Take 250 mg by mouth 4 times daily as needed 16 tablet 0     fluticasone-salmeterol (ADVAIR) 250-50 MCG/DOSE inhaler Inhale 1 puff into the lungs every 12 hours 1 Inhaler 3     gabapentin (NEURONTIN) 800 MG tablet Take 1 tablet (800 mg) by mouth 3 times daily 21 tablet 0     hydrOXYzine (ATARAX) 25 MG tablet TAKE 1 TABLET BY MOUTH FOUR TIMES A DAY AS NEEDED FOR ITCHING 120 tablet 1     ketoprofen 10% in PLO 10% topical gel Place 2-4 g onto the skin every 4 hours as needed for moderate pain 200 g 1     lisinopril (PRINIVIL/ZESTRIL) 5 MG tablet Take 1 tablet (5 mg) by mouth daily 30 tablet 0     losartan (COZAAR) 25 MG tablet Take 1 tablet (25 mg) by mouth daily 7 tablet 0     metoprolol succinate ER (TOPROL-XL) 25 MG 24 hr tablet Take 1 tablet (25 mg) by mouth daily 7 tablet 0     omeprazole (PRILOSEC) 20 MG DR capsule Take 20 mg by mouth daily       oxybutynin ER (DITROPAN-XL) 5 MG 24 hr tablet Take 1 tablet (5 mg) by mouth daily 90 tablet 3     pramipexole (MIRAPEX) 0.125 MG tablet Take 1 tablet (0.125 mg) by mouth At Bedtime 90 tablet 0     predniSONE (DELTASONE) 20 MG tablet Take two tablets (= 40mg) each day for 5 (five) days. 10 tablet 0     rOPINIRole (REQUIP) 0.25 MG tablet TAKE 3 TABLETS BY MOUTH DAILY AT BEDTIME 270 tablet 0     rOPINIRole (REQUIP) 0.25 MG tablet Take 3 tablets (0.75 mg) by mouth At Bedtime 270 tablet 0     rOPINIRole (REQUIP) 1 MG tablet Take 1 tablet (1 mg) by mouth 3 times daily 90 tablet 3     SUMAtriptan (IMITREX) 50 MG tablet TAKE 1 TAB  AT ONSET OF MIGRAINE. MAY REPEAT AFTER 2 HRS. MAX 2 TABS/DAY 21 tablet 0     topiramate (TOPAMAX) 25 MG tablet Take 3 tablets (75 mg) by mouth 3 times daily 270 tablet 0       Medical History: any changes in medical history since they were last seen? Yes  Was treated for upper respiratory difficulty, was given prednisone and albuterol nebs. She had a reaction to this, foot swelling and pain but this improved.    Review of Systems:  The 14 system ROS was reviewed from the intake questionnaire, and is positive for: cough, wheezing, shortness of breath, lower extremity edema, hypertension, joint pain, arthritis, stiffness, back pain, neck pain, paresthesias, weakness  Any bowel or bladder problems: frequency, urgency, incontinence.  Mood: depression, anxiety, stress    Physical Exam:  Blood pressure 150/88, pulse 52, SpO2 94 %.  General: NAD, pleasant  Gait: Unsteady, uses powered mobility.  MSK exam: widespread myofascial tenderness. Lumbar paraspinal tenderness bilaterally.     Assessment:   Ms. Tijerina is a 63 year old with past medical history including DM, HTN, anxiety depression and chronic pain who presents for evaluation and treatment of the following chronic pain conditions:     1. Chronic low back and leg pain: History of several lumbar surgeries with ongoing pain. She has had spinal cord stimulators in the past with good relief but they were removed due to discomfort from the battery. Her last lumbar surgery was a decompression and fusion in 2009, no spinal cord stimulator after this. She has had persisting pain since the 2009 surgery which is consistent with lumbar spondylosis and radiculopathy. She also has arachnoiditis seen on prior MRI/CT scan which is also likely contributing to her symptoms. She had epidurals as recently as 4 years ago with good benefit. Repeated epidural on 12/19/18 with significant pain relief.     2. Chronic bilateral hip pain:  The pain is likely due to gluteal muscle  dysfunction/greater trochanter bursitis and IT band syndrome. Bilateral greater trochanter bursa injections performed in clinic on 10/23/18 with 4+ months of pain relief. Pain has been increasing in the past few weeks, will re-order the injection.     3. Fibromyalgia: Meets ACR criteria for fibromyalgia. Has tried many medications in the past and is currently on gabapentin 800mg with benefit. Was started on duloxetine which was increased to 60mg daily, notes a lot of benefit for her chronic pain with this medication. Gabapentin was increased to 1100mg at night, will continue to increase this to 1200mg TID.      4. Chronic opiate use: Has been on percocet for many years now. States that she has functional benefit in ADLs and mobility and uses this less than prescribed. Discussed goals of the chronic pain clinic include trying to wean opioids as pain is better managed with other treatments and modalities and she was agreeable to this. She has decreased her percocet from 5-325 six tablets daily to three tablets daily currently. She did receive a prescription of carisoprodol recently, discussed that I want her to stop this medication and take a different muscle relaxant in its place.     Mental Health - the patient's mental health concerns, specifically stress, anxiety and depression, affect her experience of pain and contribute to her clinically significant distress.           Plan:  The following recommendations were given to the patient. Diagnosis, treatment options, risks, benefits, and alternatives were discussed, and all questions were answered. The patient expressed understanding of the plan for management.      I am recommending a multidisciplinary treatment plan to help this patient better manage her pain.  This includes:      1. Physical Therapy: Chronic pain PT referral placed. Still hasn't been able to make this appointment due to uncertain housing situation and cost issues.  2. Clinical Health Pain  Psychologist: Would certainly benefit from biofeed back/coping strategies. REcently established care with a psychiatrist, defer this for now.  3. Self Care Recommendations: Gentle progressive exercise that does not increase pain - gradually increase daily walking program.  Take mini breaks - 5 minutes of mindfullness a couple times a day.   4. Diagnostic Studies: none at this time  5. Medication Management:   1. Oxycodone 5/325 every 6 hours prn - max 3/day.  2. Recommended against using carisoprodol.  3. Methocarbamol 500mg TID prn  4. Increase gabapentin to 1100mg TID. Then to 1200mg TID for the next refill.  5. Continue duloxetine 60mg daily.  6. Topamax 75mg TID  7. Sumatriptan 50mg prn  6. Further procedures recommended:   1. Bilateral greater trochanter bursa injections performed in clinic on 10/23/18 with 4 months of relief, order was placed for repeat injection.  2. Caudal epidural with significant relief, can be repeated every 3 months as needed. Will order for 1 month after bursa injections.  7. Referrals: none  8. Follow up: 4 weeks in clinic        I spent 40 minutes of time face to face with the patient.  Greater than 50% of this time was spent in patient counseling and/or coordination of care regarding principles of multidisciplinary care, medication management, and treatment options as discussed above.           Maximiliano Sanchez DO  [facility]  03/20/19

## 2019-03-20 ENCOUNTER — OFFICE VISIT (OUTPATIENT)
Dept: PALLIATIVE MEDICINE | Facility: CLINIC | Age: 64
End: 2019-03-20
Payer: MEDICARE

## 2019-03-20 ENCOUNTER — OFFICE VISIT (OUTPATIENT)
Dept: PEDIATRICS | Facility: CLINIC | Age: 64
End: 2019-03-20
Payer: MEDICARE

## 2019-03-20 VITALS — OXYGEN SATURATION: 94 % | HEART RATE: 52 BPM | SYSTOLIC BLOOD PRESSURE: 150 MMHG | DIASTOLIC BLOOD PRESSURE: 88 MMHG

## 2019-03-20 VITALS
HEART RATE: 97 BPM | SYSTOLIC BLOOD PRESSURE: 176 MMHG | BODY MASS INDEX: 32.97 KG/M2 | HEIGHT: 62 IN | TEMPERATURE: 97.9 F | DIASTOLIC BLOOD PRESSURE: 76 MMHG | WEIGHT: 179.2 LBS | OXYGEN SATURATION: 99 %

## 2019-03-20 DIAGNOSIS — G89.4 CHRONIC PAIN SYNDROME: ICD-10-CM

## 2019-03-20 DIAGNOSIS — M70.61 TROCHANTERIC BURSITIS OF RIGHT HIP: ICD-10-CM

## 2019-03-20 DIAGNOSIS — Z13.1 SCREENING FOR DIABETES MELLITUS: ICD-10-CM

## 2019-03-20 DIAGNOSIS — M96.1 FAILED BACK SURGICAL SYNDROME: ICD-10-CM

## 2019-03-20 DIAGNOSIS — K58.0 IRRITABLE BOWEL SYNDROME WITH DIARRHEA: Primary | ICD-10-CM

## 2019-03-20 DIAGNOSIS — M79.18 MYOFASCIAL PAIN: Primary | ICD-10-CM

## 2019-03-20 DIAGNOSIS — R73.09 OTHER ABNORMAL GLUCOSE: ICD-10-CM

## 2019-03-20 DIAGNOSIS — J44.1 CHRONIC OBSTRUCTIVE PULMONARY DISEASE WITH ACUTE EXACERBATION (H): ICD-10-CM

## 2019-03-20 DIAGNOSIS — I10 BENIGN ESSENTIAL HYPERTENSION: ICD-10-CM

## 2019-03-20 DIAGNOSIS — M79.7 FIBROMYALGIA: ICD-10-CM

## 2019-03-20 DIAGNOSIS — Z13.220 SCREENING FOR HYPERLIPIDEMIA: ICD-10-CM

## 2019-03-20 LAB — HBA1C MFR BLD: 5.4 % (ref 0–5.6)

## 2019-03-20 PROCEDURE — 80053 COMPREHEN METABOLIC PANEL: CPT | Performed by: PEDIATRICS

## 2019-03-20 PROCEDURE — 36415 COLL VENOUS BLD VENIPUNCTURE: CPT | Performed by: PEDIATRICS

## 2019-03-20 PROCEDURE — 80061 LIPID PANEL: CPT | Performed by: PEDIATRICS

## 2019-03-20 PROCEDURE — 83036 HEMOGLOBIN GLYCOSYLATED A1C: CPT | Performed by: PEDIATRICS

## 2019-03-20 PROCEDURE — 99214 OFFICE O/P EST MOD 30 MIN: CPT | Mod: GC | Performed by: STUDENT IN AN ORGANIZED HEALTH CARE EDUCATION/TRAINING PROGRAM

## 2019-03-20 PROCEDURE — 99215 OFFICE O/P EST HI 40 MIN: CPT | Performed by: PHYSICAL MEDICINE & REHABILITATION

## 2019-03-20 PROCEDURE — 82043 UR ALBUMIN QUANTITATIVE: CPT | Performed by: PEDIATRICS

## 2019-03-20 RX ORDER — OXYCODONE AND ACETAMINOPHEN 5; 325 MG/1; MG/1
TABLET ORAL
Qty: 90 TABLET | Refills: 0 | Status: SHIPPED | OUTPATIENT
Start: 2019-03-20 | End: 2019-03-22

## 2019-03-20 RX ORDER — LISINOPRIL 5 MG/1
5 TABLET ORAL DAILY
Qty: 30 TABLET | Refills: 0 | Status: CANCELLED | OUTPATIENT
Start: 2019-03-20

## 2019-03-20 RX ORDER — LOSARTAN POTASSIUM 25 MG/1
25 TABLET ORAL DAILY
Qty: 90 TABLET | Refills: 3 | Status: SHIPPED | OUTPATIENT
Start: 2019-03-20 | End: 2019-12-17

## 2019-03-20 RX ORDER — CHLORTHALIDONE 25 MG/1
25 TABLET ORAL DAILY
Qty: 90 TABLET | Refills: 3 | Status: SHIPPED | OUTPATIENT
Start: 2019-03-20 | End: 2019-03-22

## 2019-03-20 RX ORDER — METOPROLOL SUCCINATE 25 MG/1
25 TABLET, EXTENDED RELEASE ORAL DAILY
Qty: 7 TABLET | Refills: 0 | Status: CANCELLED | OUTPATIENT
Start: 2019-03-20

## 2019-03-20 RX ORDER — METHOCARBAMOL 500 MG/1
500 TABLET, FILM COATED ORAL 3 TIMES DAILY PRN
Qty: 90 TABLET | Refills: 1 | Status: SHIPPED | OUTPATIENT
Start: 2019-03-20 | End: 2019-06-21

## 2019-03-20 RX ORDER — GABAPENTIN 600 MG/1
1200 TABLET ORAL 3 TIMES DAILY
Qty: 180 TABLET | Refills: 3 | Status: SHIPPED | OUTPATIENT
Start: 2019-03-20 | End: 2019-12-12

## 2019-03-20 RX ORDER — DICYCLOMINE HCL 20 MG
20 TABLET ORAL EVERY 6 HOURS
COMMUNITY
End: 2019-03-20

## 2019-03-20 RX ORDER — DICYCLOMINE HCL 20 MG
20 TABLET ORAL EVERY 6 HOURS
Qty: 90 TABLET | Refills: 3 | Status: SHIPPED | OUTPATIENT
Start: 2019-03-20 | End: 2019-12-17

## 2019-03-20 ASSESSMENT — MIFFLIN-ST. JEOR: SCORE: 1331.1

## 2019-03-20 ASSESSMENT — PAIN SCALES - GENERAL: PAINLEVEL: EXTREME PAIN (9)

## 2019-03-20 NOTE — PATIENT INSTRUCTIONS
1. I refilled your percocet.  2. Add 300mg doses to your morning and afternoon doses of gabapentin until you are taking 1100mg three times daily. Then when you  your new prescription of gabapentin it will be two 600mg tablets three times daily for total 1200mg three times daily.    3. Stop taking carisoprodol and start methocarbamol 500mg three times daily. Take this at night initially to make sure you are not having side effects.    4. Continue the other medications at the current doses.    5. I ordered a hip bursa injection.    6. Follow up in clinic in 1 month.    ----------------------------------------------------------------  Clinic Number:  [phone]   Call this number with any questions about your care and for scheduling assistance. Calls are returned Monday through Friday between 8 AM and 4:30 PM. We usually get back to you within 2 business days depending on the issue/request.       Medication refills:    For non-narcotic medications, call your pharmacy directly to request a refill. The pharmacy will contact the [facility] for authorization. Please allow 3-4 days for these refills to be processed.     For narcotic refills, call the clinic number or send a [employer] message. Please contact us 7-10 days before your refill is due. The message MUST include the name of the specific medication(s) requested and how you would like to receive the prescription(s). The options are as follows:    Pain Clinic staff can mail the prescription to your pharmacy. Please tell us the name of the pharmacy.    You may pick the prescription up at the Pain Clinic (tell us the location) or during a clinic visit with your pain provider    Pain Clinic staff can deliver the prescription to the [facility] in the clinic building. Please tell us the location.      We believe regular attendance is key to your success in our program.    Any time you are unable to keep your appointment we ask that you call us at  least 24 hours in advance to let us know. This will allow us to offer the appointment time to another patient.

## 2019-03-20 NOTE — PROGRESS NOTES
SUBJECTIVE:   Lilliana Cardenas is a 63 year old female who presents to clinic today for the following health issues:      Hypertension Follow-up      Outpatient blood pressures are not being checked.    Low Salt Diet: no added salt      Amount of exercise or physical activity: 6-7 days/week for an average of greater than 60 minutes    Problems taking medications regularly: Yes,  cost of medication    Medication side effects: yes side effects     Diet: avoids lactose, spicy and salty foods     Other concerns:   IBS follow up and medication concerns      Lilliana notes that in September she was evicted from her prior housing.  Since that time she has been living in shelters or with friends.  She was also briefly hospitalized with upper respiratory tract tract infection and at that time was noted to have an elevated blood pressure with a systolic in the 200s.  She notes that during the time she has struggled to take her blood pressure medications as well as her other meds given financial and logistical constraints.  She is currently taking her inhalers, however notes that she has not taken her blood pressure medication since the beginning of March as she had run out of refills.  She was recently able to acquire insurance, and has now been able to access medical care again.  She notes she struggles significantly with transportation.    In terms of her blood pressure, she had previously been on lisinopril, metoprolol and losartan.  She states that her blood pressures been well controlled as typically in the normal range when she was not sick.  However when she is ill she notes that her blood pressure becomes significantly elevated with systolics in the 200s.  She is currently not taking any blood pressure medications.  She is concerned that her current medication regimen is not appropriate as she continues to get very hypertensive when she is ill.  She denies any history of diabetes.    In terms of her COPD and  breathing she states that she continues to get some shortness of breath with ambulation and generally feel fatigued.  She does not feel that her current inhaler is as effective as Advair was for her in the past and was hoping to switch back to Advair.  She notes that she has been active and has been walking every day.    States that she has struggled to maintain a healthy diet as she has been eating and shoulders and has had a significant amount of carbs.    In terms of her IBS she notes that she is very sensitive to certain foods in her diet and must be very conscientious about what he eats.  When she eats something that she should she will not abdominal pain, gas, and bloating.  She tries to avoid spice as well as heavy foods.  She feels that she does better with solids.  This is been a struggle given her current living situation.  She states that he also avoids dairy secondary to lactose intolerance however she is able to tolerate yogurt.  She has previously used Bentyl which she felt was very helpful with her abdominal symptoms, however has not been able to take that as her prescription ran out.  She believes her abdominal symptoms began when she was 26 after a cholecystectomy.    -------------------------------------    Problem list and histories reviewed & adjusted, as indicated.  Additional history: as documented    Patient Active Problem List   Diagnosis     Chronic pain syndrome     Chronic low back pain     Opiate dependence (H)     Overdose     Moderate major depression (H)     Depression     Spondylosis of lumbar region without myelopathy or radiculopathy     Past Surgical History:   Procedure Laterality Date     GYN SURGERY         Social History     Tobacco Use     Smoking status: Light Tobacco Smoker     Packs/day: 1.00     Years: 38.00     Pack years: 38.00     Smokeless tobacco: Never Used     Tobacco comment: pt states she has smoked on & off for since she was 18   Substance Use Topics     Alcohol  use: No     History reviewed. No pertinent family history.      Current Outpatient Medications   Medication Sig Dispense Refill     chlorthalidone (HYGROTON) 25 MG tablet Take 1 tablet (25 mg) by mouth daily 90 tablet 3     dicyclomine (BENTYL) 20 MG tablet Take 1 tablet (20 mg) by mouth every 6 hours 90 tablet 3     DULoxetine (CYMBALTA) 60 MG capsule Take 1 capsule (60 mg) by mouth daily 90 capsule 3     fluticasone-salmeterol (ADVAIR) 250-50 MCG/DOSE inhaler Inhale 1 puff into the lungs every 12 hours 1 Inhaler 3     gabapentin (NEURONTIN) 600 MG tablet Take 2 tablets (1,200 mg) by mouth 3 times daily 180 tablet 3     hydrOXYzine (ATARAX) 25 MG tablet TAKE 1 TABLET BY MOUTH FOUR TIMES A DAY AS NEEDED FOR ITCHING 120 tablet 1     ketoprofen 10% in PLO 10% topical gel Place 2-4 g onto the skin every 4 hours as needed for moderate pain 200 g 1     losartan (COZAAR) 25 MG tablet Take 1 tablet (25 mg) by mouth daily 90 tablet 3     losartan (COZAAR) 25 MG tablet Take 1 tablet (25 mg) by mouth daily 7 tablet 0     methocarbamol (ROBAXIN) 500 MG tablet Take 1 tablet (500 mg) by mouth 3 times daily as needed for muscle spasms 90 tablet 1     omeprazole (PRILOSEC) 20 MG DR capsule Take 20 mg by mouth daily       order for DME Equipment being ordered: four wheeled walker with chair 1 Units 0     oxybutynin ER (DITROPAN-XL) 5 MG 24 hr tablet Take 1 tablet (5 mg) by mouth daily 90 tablet 3     oxyCODONE-acetaminophen (PERCOCET) 5-325 MG tablet 1 tabs every 6 hours as needed, maximum 3 tabs daily. 90 tablet 0     pramipexole (MIRAPEX) 0.125 MG tablet Take 1 tablet (0.125 mg) by mouth At Bedtime 90 tablet 0     rOPINIRole (REQUIP) 1 MG tablet Take 1 tablet (1 mg) by mouth 3 times daily 90 tablet 3     SUMAtriptan (IMITREX) 50 MG tablet TAKE 1 TAB AT ONSET OF MIGRAINE. MAY REPEAT AFTER 2 HRS. MAX 2 TABS/DAY 21 tablet 0     topiramate (TOPAMAX) 25 MG tablet Take 3 tablets (75 mg) by mouth 3 times daily (Patient taking  differently: Take 50 mg by mouth 3 times daily ) 270 tablet 0     traZODone (DESYREL) 50 MG tablet TAKE 1 TABLET BY MOUTH NIGHTLY AS NEEDED FOR SLEEP 30 tablet 0     albuterol (PROAIR HFA/PROVENTIL HFA/VENTOLIN HFA) 108 (90 Base) MCG/ACT inhaler Inhale 2 puffs into the lungs 4 times daily as needed for other (dyspnea) (Patient not taking: Reported on 3/20/2019) 1 Inhaler 1     Carisoprodol 250 MG TABS Take 250 mg by mouth 4 times daily as needed (Patient not taking: Reported on 3/20/2019) 16 tablet 0     gabapentin (NEURONTIN) 300 MG capsule Take 1 capsule (300 mg) by mouth At Bedtime (Patient not taking: Reported on 3/20/2019) 90 capsule 3     lisinopril (PRINIVIL/ZESTRIL) 5 MG tablet Take 1 tablet (5 mg) by mouth daily (Patient not taking: Reported on 3/20/2019) 30 tablet 0     metoprolol succinate ER (TOPROL-XL) 25 MG 24 hr tablet Take 1 tablet (25 mg) by mouth daily (Patient not taking: Reported on 3/20/2019) 7 tablet 0     order for DME Equipment being ordered: BP monitor 1 each 0     predniSONE (DELTASONE) 20 MG tablet Take two tablets (= 40mg) each day for 5 (five) days. (Patient not taking: Reported on 3/20/2019.) 10 tablet 0     Allergies   Allergen Reactions     Lactose      Diarrhea      Pollen Extract Unknown     Sneezing, watery eyes, itchy nose     Prednisone      Pain       Recent Labs   Lab Test 03/20/19  1530 01/22/19 2007 11/25/18  0805  10/03/18  0153  12/11/11  1450  08/19/11  0929   A1C 5.4  --   --   --   --   --   --  5.7  --  5.6   LDL 93  --   --   --   --   --   --   --   --   --    HDL 65  --   --   --   --   --   --   --   --   --    TRIG 104  --   --   --   --   --   --   --   --   --    ALT 19  --   --  17  --  21   < >  --    < >  --    CR 0.88 0.80   < > 0.71   < > 0.84   < >  --    < >  --    GFRESTIMATED 71 79   < > 84   < > 68   < >  --    < >  --    GFRESTBLACK 82 >90   < > >90   < > 83   < >  --    < >  --    POTASSIUM 3.7 3.5   < > 3.8   < > 3.4   < >  --    < >  --    TSH  --  "  --   --  1.45  --  2.23  --   --   --  2.56    < > = values in this interval not displayed.      BP Readings from Last 3 Encounters:   03/20/19 176/76   03/20/19 150/88   02/14/19 152/86    Wt Readings from Last 3 Encounters:   03/20/19 81.3 kg (179 lb 3.2 oz)   02/14/19 80.6 kg (177 lb 9.6 oz)   01/22/19 79.4 kg (175 lb)                  Labs reviewed in EPIC    Reviewed and updated as needed this visit by clinical staff       Reviewed and updated as needed this visit by Provider         ROS:  Constitutional, HEENT, cardiovascular, pulmonary, gi and gu systems are negative, except as otherwise noted.    OBJECTIVE:     /76 (BP Location: Right arm, Patient Position: Chair, Cuff Size: Adult Large)   Pulse 97   Temp 97.9  F (36.6  C) (Oral)   Ht 1.575 m (5' 2\")   Wt 81.3 kg (179 lb 3.2 oz)   SpO2 99%   BMI 32.78 kg/m    Body mass index is 32.78 kg/m .  GENERAL: healthy, alert and no distress.  Seated in a wheelchair.  EYES: Eyes grossly normal to inspection, PERRL and conjunctivae and sclerae normal  HENT:  nose and mouth without ulcers or lesions, edentulous  RESP: lungs clear to auscultation - no rales, rhonchi or wheezes  CV: regular rate and rhythm, normal S1 S2, no S3 or S4, no murmur, click or rub, no peripheral edema  ABDOMEN: soft, nontender, no hepatosplenomegaly, no masses and bowel sounds normal.  Has well-healed abdominal incision from prior surgery, with a small lesion overlying it at the level of the umbilicus on the right.  No purulent drainage.  MS: no gross musculoskeletal defects noted, no edema  SKIN: no suspicious lesions or rashes  NEURO: Normal strength and tone, mentation intact and speech normal  PSYCH: mentation appears normal, affect normal/bright    Diagnostic Test Results:  Results for orders placed or performed in visit on 03/20/19   Lipid panel reflex to direct LDL Fasting   Result Value Ref Range    Cholesterol 179 <200 mg/dL    Triglycerides 104 <150 mg/dL    HDL " Cholesterol 65 >49 mg/dL    LDL Cholesterol Calculated 93 <100 mg/dL    Non HDL Cholesterol 114 <130 mg/dL   **A1C FUTURE anytime   Result Value Ref Range    Hemoglobin A1C 5.4 0 - 5.6 %   **Comprehensive metabolic panel FUTURE anytime   Result Value Ref Range    Sodium 142 133 - 144 mmol/L    Potassium 3.7 3.4 - 5.3 mmol/L    Chloride 111 (H) 94 - 109 mmol/L    Carbon Dioxide 19 (L) 20 - 32 mmol/L    Anion Gap 12 3 - 14 mmol/L    Glucose 96 70 - 99 mg/dL    Urea Nitrogen 11 7 - 30 mg/dL    Creatinine 0.88 0.52 - 1.04 mg/dL    GFR Estimate 71 >60 mL/min/[1.73_m2]    GFR Estimate If Black 82 >60 mL/min/[1.73_m2]    Calcium 8.8 8.5 - 10.1 mg/dL    Bilirubin Total 0.3 0.2 - 1.3 mg/dL    Albumin 3.8 3.4 - 5.0 g/dL    Protein Total 7.3 6.8 - 8.8 g/dL    Alkaline Phosphatase 101 40 - 150 U/L    ALT 19 0 - 50 U/L    AST 16 0 - 45 U/L   Albumin Random Urine Quantitative with Creat Ratio   Result Value Ref Range    Creatinine Urine 38 mg/dL    Albumin Urine mg/L 7 mg/L    Albumin Urine mg/g Cr 18.01 0 - 25 mg/g Cr       ASSESSMENT/PLAN:       1. Benign essential hypertension  Has a long-standing history of hypertension, previously on lisinopril, losartan and metoprolol.  She states she does not want to continue with lisinopril as she is concerned that her blood pressure continues to be significantly elevated at times of illness.  We will transition her to chlorthalidone with losartan today.  - losartan (COZAAR) 25 MG tablet; Take 1 tablet (25 mg) by mouth daily  Dispense: 90 tablet; Refill: 3  - chlorthalidone (HYGROTON) 25 MG tablet; Take 1 tablet (25 mg) by mouth daily  Dispense: 90 tablet; Refill: 3  - **Comprehensive metabolic panel FUTURE anytime; Future  - **Comprehensive metabolic panel FUTURE anytime    2. Chronic obstructive pulmonary disease with acute exacerbation (H)  Notes that her current inhaler does not feel that it is as helpful as Advair, especially desire to switch back to Advair.  - fluticasone-salmeterol  (ADVAIR) 250-50 MCG/DOSE inhaler; Inhale 1 puff into the lungs every 12 hours  Dispense: 1 Inhaler; Refill: 3    3. Irritable bowel syndrome with diarrhea  Notes that she continues to have a very sensitive stomach, states that she had previously tried Bentyl with significant improvement.  - dicyclomine (BENTYL) 20 MG tablet; Take 1 tablet (20 mg) by mouth every 6 hours  Dispense: 90 tablet; Refill: 3    4. Screening for diabetes mellitus  - Albumin Random Urine Quantitative with Creat Ratio; Future  - **A1C FUTURE anytime; Future  - **A1C FUTURE anytime  - Albumin Random Urine Quantitative with Creat Ratio    5. Screening for hyperlipidemia  - Lipid panel reflex to direct LDL Fasting; Future  - Lipid panel reflex to direct LDL Fasting      See Patient Instructions    Sumaya Pereira MD  [facility]    I have seen the patient, discussed with the resident and agree with the history, physical and plan as documented above.    Blanca Gibbs MD  Internal Medicine - Pediatrics

## 2019-03-20 NOTE — PATIENT INSTRUCTIONS
Thank you for coming to clinic today!     We will have you switch from wixela to advair.  We are starting you on chlorthalidone and resuming your previous dose of losartan. We will stop your lisinopril.  We will have you follow up in 2 weeks to check labs and your blood pressure.

## 2019-03-21 ENCOUNTER — TELEPHONE (OUTPATIENT)
Dept: INTERNAL MEDICINE | Facility: CLINIC | Age: 64
End: 2019-03-21

## 2019-03-21 ENCOUNTER — TELEPHONE (OUTPATIENT)
Dept: PALLIATIVE MEDICINE | Facility: CLINIC | Age: 64
End: 2019-03-21

## 2019-03-21 DIAGNOSIS — I10 BENIGN ESSENTIAL HYPERTENSION: Primary | ICD-10-CM

## 2019-03-21 DIAGNOSIS — M96.1 FAILED BACK SURGICAL SYNDROME: ICD-10-CM

## 2019-03-21 DIAGNOSIS — G89.4 CHRONIC PAIN SYNDROME: ICD-10-CM

## 2019-03-21 LAB
ALBUMIN SERPL-MCNC: 3.8 G/DL (ref 3.4–5)
ALP SERPL-CCNC: 101 U/L (ref 40–150)
ALT SERPL W P-5'-P-CCNC: 19 U/L (ref 0–50)
ANION GAP SERPL CALCULATED.3IONS-SCNC: 12 MMOL/L (ref 3–14)
AST SERPL W P-5'-P-CCNC: 16 U/L (ref 0–45)
BILIRUB SERPL-MCNC: 0.3 MG/DL (ref 0.2–1.3)
BUN SERPL-MCNC: 11 MG/DL (ref 7–30)
CALCIUM SERPL-MCNC: 8.8 MG/DL (ref 8.5–10.1)
CHLORIDE SERPL-SCNC: 111 MMOL/L (ref 94–109)
CHOLEST SERPL-MCNC: 179 MG/DL
CO2 SERPL-SCNC: 19 MMOL/L (ref 20–32)
CREAT SERPL-MCNC: 0.88 MG/DL (ref 0.52–1.04)
CREAT UR-MCNC: 38 MG/DL
GFR SERPL CREATININE-BSD FRML MDRD: 71 ML/MIN/{1.73_M2}
GLUCOSE SERPL-MCNC: 96 MG/DL (ref 70–99)
HDLC SERPL-MCNC: 65 MG/DL
LDLC SERPL CALC-MCNC: 93 MG/DL
MICROALBUMIN UR-MCNC: 7 MG/L
MICROALBUMIN/CREAT UR: 18.01 MG/G CR (ref 0–25)
NONHDLC SERPL-MCNC: 114 MG/DL
POTASSIUM SERPL-SCNC: 3.7 MMOL/L (ref 3.4–5.3)
PROT SERPL-MCNC: 7.3 G/DL (ref 6.8–8.8)
SODIUM SERPL-SCNC: 142 MMOL/L (ref 133–144)
TRIGL SERPL-MCNC: 104 MG/DL

## 2019-03-21 NOTE — TELEPHONE ENCOUNTER
Order is pended.  Please print and sign.  Patient has Medicare, and needs signed order.  [facility] pharmacy doesn't have blood pressure monitors for purchase.  Patient will need to take signed RX to another pharmacy or medical supply store.  Thanks.  SHANIA Solomon RN

## 2019-03-21 NOTE — TELEPHONE ENCOUNTER
Received call from patient requesting refill(s) of oxyCODONE-acetaminophen (PERCOCET) 5-325 MG tablet     Last picked up from pharmacy on 3/20/19 only got 21 the pharmacy stated.    Pt last seen by prescribing provider on 3/20/19  Next appt scheduled for 3/25/19     checked in the past 6 months? Yes If no, print current report and give to RN    Last urine drug screen date 11/25/18  Current opioid agreement on file (completed within the last year) Yes Date of opioid agreement: 11/19/18    Processing        [facility] [address] [place] [address] - [address] AT [place] [address]  [address]  [facility] [address]  Phone: [phone] Fax: [phone]          Will route to nursing pool for review and preparation of prescription(s).

## 2019-03-21 NOTE — TELEPHONE ENCOUNTER
Reason for Call: Request for an order or referral:    Order or referral being requested: medical equipment so she can monitor her BP. She said she is homeless and does not have transportation. She needs a order for the machine and any supplies that would be needed.  She still has scripts down stairs that need to be picked up. She would like to  everything at once because of transportation.       [facility] [place] [place] - [address] [place] wants Dr Pereira to be her new primary.    Date needed: as soon as possible    Has the patient been seen by the PCP for this problem? YES    Additional comments: please call if any questions. She would like to  the equipment today.    Phone number Patient can be reached at:  Home number on file [phone] (home)    Best Time:  any    Can we leave a detailed message on this number?  YES    Call taken on 3/21/2019 at 9:47 AM by No Pennington

## 2019-03-21 NOTE — TELEPHONE ENCOUNTER
Medication refill information reviewed.     Due date for percocet is 3/27 since pt only received a 7 day supply     Prescription prepped for review. Patient scheduled for hip injection on Monday 3/25 so patient can  Rx at that time.     Will route to provider.     LORNA TidwellN, RN-BC  Patient Care Supervisor/Care Coordinator  [facility]

## 2019-03-21 NOTE — TELEPHONE ENCOUNTER
LVM for pt to schedule Hip Bursa Injection.      Filomena WATSON    [facility] [place]

## 2019-03-21 NOTE — TELEPHONE ENCOUNTER
Patient is calling, she states that her one insurance will only cover 7 days of the Oxycodone. She is needing another script for the rest of month.       Please send to [facility] in [place] [address]    Please call patient with any other questions      Crys MUHAMMAD    [facility]

## 2019-03-21 NOTE — TELEPHONE ENCOUNTER
Script printed, signed, and in station out basket or on MA/LPN/RN desk    Blanca Gibbs MD  Internal Medicine - Pediatrics

## 2019-03-22 DIAGNOSIS — I10 ESSENTIAL HYPERTENSION: Primary | ICD-10-CM

## 2019-03-22 RX ORDER — AMLODIPINE BESYLATE 10 MG/1
10 TABLET ORAL DAILY
Qty: 30 TABLET | Refills: 1 | Status: SHIPPED | OUTPATIENT
Start: 2019-03-22 | End: 2019-12-17

## 2019-03-22 NOTE — PROGRESS NOTES
Patient was called and advised-she verbalized understanding.  Faby Carlson RN  Message handled by Nurse Triage.

## 2019-03-22 NOTE — PROGRESS NOTES
OK to stop chlorthalidone and start amlodipine.  New script sent to pharmacy.      Blanca Gibbs MD  Internal Medicine - Pediatrics

## 2019-03-22 NOTE — TELEPHONE ENCOUNTER
Patient calling to check on the status of this refill. Please call. Phone #[phone]      Aditi Mayen    [facility]

## 2019-03-22 NOTE — TELEPHONE ENCOUNTER
Called pt and LM that we got her message about her request and that we will get it ready for her to  when she comes for her injection appointment on 3/25 with Dr. Sanchez. LM that she is not due to start a new supply until 3/27.     Patient only received #21 tabs/7 day supply on 3/20. Pharmacy not able to fill full amount, #90 tabs, due to insurance.     Prepped Rx for review by Dr. Sanchez.       LORNA TidwellN, RN-BC  Patient Care Supervisor/Care Coordinator  [facility] [place]

## 2019-03-25 ENCOUNTER — OFFICE VISIT (OUTPATIENT)
Dept: PALLIATIVE MEDICINE | Facility: CLINIC | Age: 64
End: 2019-03-25
Payer: MEDICARE

## 2019-03-25 VITALS — OXYGEN SATURATION: 98 % | SYSTOLIC BLOOD PRESSURE: 138 MMHG | DIASTOLIC BLOOD PRESSURE: 72 MMHG | HEART RATE: 104 BPM

## 2019-03-25 DIAGNOSIS — M70.62 TROCHANTERIC BURSITIS OF BOTH HIPS: ICD-10-CM

## 2019-03-25 DIAGNOSIS — M70.61 TROCHANTERIC BURSITIS OF BOTH HIPS: ICD-10-CM

## 2019-03-25 DIAGNOSIS — M79.18 MYOFASCIAL PAIN: Primary | ICD-10-CM

## 2019-03-25 PROCEDURE — 20610 DRAIN/INJ JOINT/BURSA W/O US: CPT | Mod: 50 | Performed by: PHYSICAL MEDICINE & REHABILITATION

## 2019-03-25 RX ORDER — OXYCODONE AND ACETAMINOPHEN 5; 325 MG/1; MG/1
TABLET ORAL
Qty: 90 TABLET | Refills: 0 | Status: SHIPPED | OUTPATIENT
Start: 2019-03-25 | End: 2019-05-02

## 2019-03-25 ASSESSMENT — PAIN SCALES - GENERAL: PAINLEVEL: EXTREME PAIN (9)

## 2019-03-25 NOTE — TELEPHONE ENCOUNTER
Confirmed by RACHELLE Hudson MA that Dr. Sanchez gave patient the prescription when at the procedure today.     LORNA TidwellN, RN-BC  Patient Care Supervisor/Care Coordinator  [facility] [place] [facility]

## 2019-03-25 NOTE — PROGRESS NOTES
[facility] - Procedure Note    Date of Service: 3/25/2019  Procedure performed: Bilateral Greater Trochanteric Bursa Injection  Diagnosis: Bilateral Trochanteric Bursitis  : Maximiliano Sanchez DO  Anesthesia: none      Indications:   Lilliana Cradenas is a 63 year old female was sent by myself for trochanteric bursa injections.  They have a history of superior/lateral thigh pain bilaterally.  Exam shows tenderness at the bilateral greater trochanters and they have tried conservative treatment including oral medications and exercises.    She had significant relief with prior bilateral greater trochanter bursa injections on 10/23/18 that lasted about 4 months.    Options/alternatives, benefits and risks were discussed with the patient. Questions were answered to her satisfaction and she agrees to proceed. Voluntary informed consent was obtained and signed.     Vitals were reviewed: Yes  Allergies were reviewed:  Yes   Medications were reviewed:  Yes   Pre-procedure pain score: 7/10    Procedure:  After getting informed consent, patient was brought into the procedure suite and was placed in a prone position on the procedure table.   A Pause for the Cause was performed.  Patient was prepped and draped in sterile fashion.     The area over the Right trochanter was palpated, and the tender area was identified, corresponding to the area of the trochanteric bursa.  The area was cleaned with Chloroprep.  A 22-gauge, 1.5-inch needle was inserted, aiming towards the trochanter.  When bone was encountered, the needle was slightly drawn.  A solution containing local anesthesic and steroid was injected.  The needle was removed.  Hemostasis was achieved. Bandaids were placed as appropriate.    The procedure was repeated on the opposite side.Left    In total, 4 ml of 0.5% bupivacaine and 60 mg of kenalog was injected.    Hemostasis was achieved, the area was cleaned, and bandaids were placed when  appropriate.  The patient tolerated the procedure well, and was taken to the recovery room.    Images were saved to PACS.      Pre-procedure pain score: 7/10  Post-procedure pain score: 5/10     Assessment/Plan: Lilliana Cardenas is a 63 year old female s/p Bilateral greater trochanteric bursa steroid injection today for hip pan.     1. Following today's procedure, the patient was advised to contact the [facility] for any of the following:   Fever, chills, or night sweats   New onset of pain, numbness, or weakness   Any questions/concerns regarding the procedure  If unable to contact the Pain Center, the patient was instructed to go to a local Emergency Room for any complications.   2. The patient will receive a follow-up call in 1 week.  3. The patient should follow-up with the referring provider in 2 weeks for post-procedure evaluation.        Maximiliano Sanchez DO  [facility]

## 2019-03-25 NOTE — PATIENT INSTRUCTIONS
[facility]   Post Procedure Instructions    Today you had:    bursa injection      Medications used:  bupivicaine   kenolog           Go to the emergency room if you develop any shortness of breath    Monitor the injection sites for signs and symptoms of infection-fever, chills, redness, swelling, warmth, or drainage to areas.    You may have soreness at injection sites for up to 24 hours.    You may apply ice to the painful areas to help minimize the discomfort of the needle pokes.    Do not apply heat to sites for at least 12 hours.    You may use anti-inflammatory medications or Tylenol for pain control if necessary    Pain Clinic phone number during work hours Monday-Friday:  [phone]    After hours provider line: [phone]      If physical therapy doesn't call you to schedule then you can call: [phone].

## 2019-03-29 ENCOUNTER — THERAPY VISIT (OUTPATIENT)
Dept: PHYSICAL THERAPY | Facility: CLINIC | Age: 64
End: 2019-03-29
Payer: MEDICARE

## 2019-03-29 DIAGNOSIS — M79.18 MYOFASCIAL PAIN: ICD-10-CM

## 2019-03-29 PROCEDURE — 97110 THERAPEUTIC EXERCISES: CPT | Mod: GP | Performed by: PHYSICAL THERAPIST

## 2019-03-29 PROCEDURE — 97161 PT EVAL LOW COMPLEX 20 MIN: CPT | Mod: GP | Performed by: PHYSICAL THERAPIST

## 2019-03-29 PROCEDURE — 97112 NEUROMUSCULAR REEDUCATION: CPT | Mod: GP | Performed by: PHYSICAL THERAPIST

## 2019-03-29 NOTE — PROGRESS NOTES
[facility] Initial Evaluation  Lilliana Cardenas is a 63 year old  female referred to physical therapy by Dr. Sanchez for treatment of myofascial pain with Precautions/Restrictions/MD instructions eval and treat     Physical Therapy Initial Evaluation: Subjective History      Injury/Condition Details:  Presenting Complaint Myofascial pain syndrome   Onset Timing/Date Chronic condition since 1999, however, has become worse since January 2019   Mechanism Occurred after first spinal surgery in 1999      Symptom Behavior Details    Primary Pain Symptoms Location: Right>left upper back pain  Quality: ache sometimes sharp   Frequency: Constant   Worst Pain 10/10   Best Pain 7/10   Symptom Provocators Sleeping, stress, prolonged standing, walking   Symptom Relievers Activity avoidance, medication   Time of day dependent? Worse during the day   Recent symptom change? None        Lifestyle & General Medical History:  General Health Reported by Patient fair   Employment Unemployeed   Usual physical activities  (within past year) Walking   Orthopaedic history Lumbar and thoracic decompression and laminectomy procedures 1999, 2001, 2003, 2005, and 2010   Notable medical history Asthma, depression, fibromyalgia, high blood pressure, history of fracture, incontinence, mental illness, migraines/headaches, numbness/tingling, OA, sleep disorder/apnea, smoking, severe headaches     HPI                    Objective:    Gait:    Gait Type:  Antalgic   Assistive Devices:  Walker                      Cervical/Thoracic Evaluation    AROM:  AROM Cervical:    Flexion:            75%, mild pain  Extension:       66%, mild pain  Rotation:         Left: 90%, mild pain     Right: 90%, mild pain  Side Bend:      Left: 50%, moderate pain     Right:  50%, moderate pain        Cervical Myotomes:  normal                        Cervical Palpation:    Tenderness present at Left:    Sternocleidomstoid; Upper Trap and  Levator  Tenderness present at Right:    Sternocleidomstoid; Upper Trap and Levator      Spinal Segmental Conclusions:    Level:  Hypo at C5, C6, C7, T1 and T2                                                General     ROS    Assessment/Plan:    Patient is a 63 year old female with cervical and thoracic complaints.    Patient has the following significant findings with corresponding treatment plan.                Diagnosis 1:  Neck pain  Pain -  manual therapy, splint/taping/bracing/orthotics, self management, education, directional preference exercise and home program  Decreased ROM/flexibility - manual therapy, therapeutic exercise, therapeutic activity and home program  Decreased joint mobility - manual therapy, therapeutic exercise, therapeutic activity and home program  Decreased strength - therapeutic exercise, therapeutic activities and home program  Decreased proprioception - neuro re-education, therapeutic activities and home program  Impaired muscle performance - neuro re-education and home program  Diagnosis 2:  Upper back pain   Pain -  manual therapy, splint/taping/bracing/orthotics, self management, education, directional preference exercise and home program  Decreased ROM/flexibility - manual therapy, therapeutic exercise, therapeutic activity and home program  Decreased joint mobility - manual therapy, therapeutic exercise, therapeutic activity and home program  Decreased strength - therapeutic exercise, therapeutic activities and home program  Decreased proprioception - neuro re-education, therapeutic activities and home program  Impaired muscle performance - neuro re-education and home program    Therapy Evaluation Codes:   1) History comprised of:   Personal factors that impact the plan of care:      Coping style and Overall behavior pattern.    Comorbidity factors that impact the plan of care are:      Asthma, Depression, Fibromyalgia, High blood pressure, Mental illness, Migraines/headaches,  Numbness/tingling, Osteoarthritis, Sleep disorder/apnea and Smoking.     Medications impacting care: Anti-depressant, Anti-inflammatory, High blood pressure, Muscle relaxant, Pain and Sleep.  2) Examination of Body Systems comprised of:   Body structures and functions that impact the plan of care:      Cervical spine and Thoracic Spine.   Activity limitations that impact the plan of care are:      Lifting, Reading/Computer work, Sitting, Squatting/kneeling, Standing, Walking and Sleeping.  3) Clinical presentation characteristics are:   Evolving/Changing.  4) Decision-Making    Moderate complexity using standardized patient assessment instrument and/or measureable assessment of functional outcome.  Cumulative Therapy Evaluation is: Moderate complexity.    Previous and current functional limitations:  (See Goal Flow Sheet for this information)    Short term and Long term goals: (See Goal Flow Sheet for this information)     Communication ability:  Patient appears to be able to clearly communicate and understand verbal and written communication and follow directions correctly.  Treatment Explanation - The following has been discussed with the patient:   RX ordered/plan of care  Anticipated outcomes  Possible risks and side effects  This patient would benefit from PT intervention to resume normal activities.   Rehab potential is fair.    Frequency:  1 X week, once daily  Duration:  for 6 weeks  Discharge Plan:  Achieve all LTG.  Independent in home treatment program.  Reach maximal therapeutic benefit.    Please refer to the daily flowsheet for treatment today, total treatment time and time spent performing 1:1 timed codes.

## 2019-03-29 NOTE — LETTER
DEPARTMENT OF HEALTH AND HUMAN SERVICES  [facility] FOR [facility]ARE & MEDICAID SERVICES    PLAN/UPDATED PLAN OF PROGRESS FOR OUTPATIENT REHABILITATION    PATIENTS NAME:  Lilliana Cardenas   : 1955  PROVIDER NUMBER:    [id number]  HICN: [id number]   PROVIDER NAME: [facility]  MEDICAL RECORD NUMBER: [id number]   START OF CARE DATE:: 19   TYPE:  PT  PRIMARY/TREATMENT DIAGNOSIS: Myofascial pain  VISITS FROM START OF CARE:  Rxs Used: 1     [facility] Initial Evaluation  Lilliana Cardenas is a 63 year old  female referred to physical therapy by Dr. Sanchez for treatment of myofascial pain with Precautions/Restrictions/MD instructions eval and treat     Physical Therapy Initial Evaluation: Subjective History      Injury/Condition Details:  Presenting Complaint Myofascial pain syndrome   Onset Timing/Date Chronic condition since , however, has become worse since 2019   Mechanism Occurred after first spinal surgery in       Symptom Behavior Details    Primary Pain Symptoms Location: Right>left upper back pain  Quality: ache sometimes sharp   Frequency: Constant   Worst Pain 10/10   Best Pain 7/10   Symptom Provocators Sleeping, stress, prolonged standing, walking   Symptom Relievers Activity avoidance, medication   Time of day dependent? Worse during the day   Recent symptom change? None        Lifestyle & General Medical History:  General Health Reported by Patient fair   Employment Unemployeed   Usual physical activities  (within past year) Walking   Orthopaedic history Lumbar and thoracic decompression and laminectomy procedures , , , , and    Notable medical history Asthma, depression, fibromyalgia, high blood pressure, history of fracture, incontinence, mental illness, migraines/headaches, numbness/tingling, OA, sleep disorder/apnea, smoking, severe headaches     PATIENTS NAME:  Lilliana Cardenas   : 1955      Objective:  Gait:    Gait  Type:  Antalgic   Assistive Devices:  Walker  Cervical/Thoracic Evaluation  AROM:  AROM Cervical:  Flexion:            75%, mild pain  Extension:       66%, mild pain  Rotation:         Left: 90%, mild pain     Right: 90%, mild pain  Side Bend:      Left: 50%, moderate pain     Right:  50%, moderate pain  Cervical Myotomes:  normal  Cervical Palpation:    Tenderness present at Left:    Sternocleidomstoid; Upper Trap and Levator  Tenderness present at Right:    Sternocleidomstoid; Upper Trap and Levator  Spinal Segmental Conclusions:    Level:  Hypo at C5, C6, C7, T1 and T2    Assessment/Plan:    Patient is a 63 year old female with cervical and thoracic complaints.    Patient has the following significant findings with corresponding treatment plan.                Diagnosis 1:  Neck pain  Pain -  manual therapy, splint/taping/bracing/orthotics, self management, education, directional preference exercise and home program  Decreased ROM/flexibility - manual therapy, therapeutic exercise, therapeutic activity and home program  Decreased joint mobility - manual therapy, therapeutic exercise, therapeutic activity and home program  Decreased strength - therapeutic exercise, therapeutic activities and home program  Decreased proprioception - neuro re-education, therapeutic activities and home program  Impaired muscle performance - neuro re-education and home program  Diagnosis 2:  Upper back pain   Pain -  manual therapy, splint/taping/bracing/orthotics, self management, education, directional preference exercise and home program  Decreased ROM/flexibility - manual therapy, therapeutic exercise, therapeutic activity and home program  Decreased joint mobility - manual therapy, therapeutic exercise, therapeutic activity and home program  Decreased strength - therapeutic exercise, therapeutic activities and home program  Decreased proprioception - neuro re-education, therapeutic activities and home program  Impaired muscle  performance - neuro re-education and home program    Therapy Evaluation Codes:   1) History comprised of:   Personal factors that impact the plan of care:      Coping style and Overall behavior pattern.    Comorbidity factors that impact the plan of care are:      Asthma, Depression, Fibromyalgia, High blood pressure, Mental illness, Migraines/headaches, Numbness/tingling, Osteoarthritis, Sleep disorder/apnea and Smoking.     Medications impacting care: Anti-depressant, Anti-inflammatory, High blood pressure, Muscle relaxant, Pain and Sleep.      PATIENTS NAME:  Lilliana Cardenas   : 1955      2) Examination of Body Systems comprised of:   Body structures and functions that impact the plan of care:      Cervical spine and Thoracic Spine.   Activity limitations that impact the plan of care are:      Lifting, Reading/Computer work, Sitting, Squatting/kneeling, Standing, Walking and Sleeping.  3) Clinical presentation characteristics are:   Evolving/Changing.  4) Decision-Making    Moderate complexity using standardized patient assessment instrument and/or measureable assessment of functional outcome.  Cumulative Therapy Evaluation is: Moderate complexity.    Previous and current functional limitations:  (See Goal Flow Sheet for this information)    Short term and Long term goals: (See Goal Flow Sheet for this information)     Communication ability:  Patient appears to be able to clearly communicate and understand verbal and written communication and follow directions correctly.  Treatment Explanation - The following has been discussed with the patient:   RX ordered/plan of care  Anticipated outcomes  Possible risks and side effects  This patient would benefit from PT intervention to resume normal activities.   Rehab potential is fair.    Frequency:  1 X week, once daily  Duration:  for 6 weeks  Discharge Plan:  Achieve all LTG.  Independent in home treatment program.  Reach maximal therapeutic  "benefit.      Caregiver Signature/Credentials _____________________________ Date ________       Treating Provider: Sesar Johnston DPT, OCS     I have reviewed and certified the need for these services and plan of treatment while under my care.        PHYSICIAN'S SIGNATURE:   _________________________________  Date___________                           Maximiliano Sanchez MD    Certification period:  Beginning of Cert date period: 03/29/19 to  End of Cert period date: 05/28/19  Functional Level Progress Report: Please see attached \"Goal Flow sheet for Functional level.\"  ____X____ Continue Services or       ________ DC Services                Service dates: From  SOC Date: 03/29/19 date to present                         "

## 2019-04-04 ENCOUNTER — TELEPHONE (OUTPATIENT)
Dept: PEDIATRICS | Facility: CLINIC | Age: 64
End: 2019-04-04

## 2019-04-04 NOTE — TELEPHONE ENCOUNTER
Patient missed Thursday follow up appointment. Spoke with Dr. Pereira who advised she wanted patient to have a Nurse BP check next week or follow up with an office visit with a provider since she started BP medication. Attempted to reach patient number out of order. ER contact number not valid either. Will consult with Dr. Gibbs. Leona Leroy MA

## 2019-04-08 DIAGNOSIS — F51.04 PSYCHOPHYSIOLOGICAL INSOMNIA: ICD-10-CM

## 2019-04-08 NOTE — TELEPHONE ENCOUNTER
"Requested Prescriptions   Pending Prescriptions Disp Refills     traZODone (DESYREL) 50 MG tablet  Last Written Prescription Date:  2/4/19  Last Fill Quantity: 30,  # refills: 0   Last office visit: 2/14/2019 with prescribing provider: 2/14/19    Future Office Visit:     30 tablet 0     Sig: Take 1 tablet (50 mg) by mouth nightly as needed       Serotonin Modulators Passed - 4/8/2019  4:51 PM        Passed - Recent (12 mo) or future (30 days) visit within the authorizing provider's specialty     Patient had office visit in the last 12 months or has a visit in the next 30 days with authorizing provider or within the authorizing provider's specialty.  See \"Patient Info\" tab in inbasket, or \"Choose Columns\" in Meds & Orders section of the refill encounter.              Passed - Medication is active on med list        Passed - Patient is age 18 or older        Passed - No active pregnancy on record        Passed - No positive pregnancy test in past 12 months          "

## 2019-04-10 ENCOUNTER — TELEPHONE (OUTPATIENT)
Dept: PEDIATRICS | Facility: CLINIC | Age: 64
End: 2019-04-10

## 2019-04-10 DIAGNOSIS — Z59.00 HOMELESSNESS: Primary | ICD-10-CM

## 2019-04-10 RX ORDER — TRAZODONE HYDROCHLORIDE 50 MG/1
50 TABLET, FILM COATED ORAL
Qty: 30 TABLET | Refills: 2 | Status: SHIPPED | OUTPATIENT
Start: 2019-04-10 | End: 2019-12-17

## 2019-04-10 SDOH — ECONOMIC STABILITY - HOUSING INSECURITY: HOMELESSNESS UNSPECIFIED: Z59.00

## 2019-04-10 NOTE — TELEPHONE ENCOUNTER
Patient missed follow up appointment. Sent letter to patient's address requesting a follow up appointment. Leona Leroy MA

## 2019-04-10 NOTE — TELEPHONE ENCOUNTER
Prescribed for insomnia.  Prescription approved per [facility] Refill Protocol.  Galilea Rowan RN

## 2019-04-10 NOTE — LETTER
[facility]  [address]  [address]  [place] [address]  [phone]    April 10, 2019    Lilliana Cardenas  [address]   [address]  [place] [address]      Dear Lilliana Cardenas:    Your health is important to us and we missed you at your recent appointment. Please call us to reschedule your appointment for another date and time.   If you did not attend your appointment because of concerns over your ability to pay for care, please contact me so we can discuss payment options.   If you have any questions regarding this notice, please contact clinic at [phone].   Sincerely,           [facility] - (Sonia Garcia) Clinic Administrator/Manager

## 2019-04-11 ENCOUNTER — PATIENT OUTREACH (OUTPATIENT)
Dept: CARE COORDINATION | Facility: CLINIC | Age: 64
End: 2019-04-11

## 2019-04-11 NOTE — LETTER
[facility] CARE COORDINATION  [facility]  [address]  [place] [address]  [phone]    April 11, 2019    Lilliana Cardenas  [address]   [address]  [place] [address]      Dear Lilliana,    I am a clinic care coordinator who works at [facility].  I recently tried to call and was unable to reach you. I wanted to introduce myself and provide you with my contact information and a description of clinic care coordination and see if there is anything that I can assist you with.     Below is a description of my role. I also included a flyer. The clinic care coordinator is a registered nurse and/or  who understand the health care system. The goal of clinic care coordination is to help you manage your health and improve access to the [facility] system in the most efficient manner. The registered nurse can assist you in meeting your health care goals by providing education, coordinating services, and strengthening the communication among your providers. The  can assist you with financial, behavioral, psychosocial, chemical dependency, counseling, and/or psychiatric resources.    Please feel free to contact me at [phone], with any questions or concerns.     Sincerely,     Andreea Harris

## 2019-04-11 NOTE — PROGRESS NOTES
Clinic Care Coordination Contact  UTC/Voicemail    Referral Source: PCP  Clinical Data: Care Coordinator Outreach  Outreach attempted x 1.  Unable to leave a message on voicemail with call back information.   Outreach attempted x 1 to pt's sister and emergency contact. Busy signal upon calling. Unable to leave a voicemail.   Plan: Care Coordinator will mail out care coordination introduction/UTC letter with care coordinator contact information and explanation of care coordination services. Care Coordinator will await return call and reassess in 2 weeks.     RADHA Mccauley  Clinic Care Coordinator  [phone]

## 2019-04-13 ENCOUNTER — HOSPITAL ENCOUNTER (EMERGENCY)
Facility: CLINIC | Age: 64
Discharge: HOME | End: 2019-04-13
Attending: EMERGENCY MEDICINE | Admitting: EMERGENCY MEDICINE
Payer: MEDICARE

## 2019-04-13 ENCOUNTER — APPOINTMENT (OUTPATIENT)
Dept: GENERAL RADIOLOGY | Facility: CLINIC | Age: 64
End: 2019-04-13
Attending: EMERGENCY MEDICINE
Payer: MEDICARE

## 2019-04-13 VITALS
RESPIRATION RATE: 20 BRPM | SYSTOLIC BLOOD PRESSURE: 158 MMHG | HEART RATE: 100 BPM | OXYGEN SATURATION: 100 % | TEMPERATURE: 97.7 F | DIASTOLIC BLOOD PRESSURE: 85 MMHG

## 2019-04-13 DIAGNOSIS — R10.84 GENERALIZED ABDOMINAL PAIN: ICD-10-CM

## 2019-04-13 DIAGNOSIS — J98.01 ACUTE BRONCHOSPASM: ICD-10-CM

## 2019-04-13 DIAGNOSIS — J18.9 ATYPICAL PNEUMONIA: ICD-10-CM

## 2019-04-13 DIAGNOSIS — R19.7 DIARRHEA, UNSPECIFIED TYPE: ICD-10-CM

## 2019-04-13 LAB
ANION GAP SERPL CALCULATED.3IONS-SCNC: 9 MMOL/L (ref 3–14)
BASOPHILS # BLD AUTO: 0 10E9/L (ref 0–0.2)
BASOPHILS NFR BLD AUTO: 0.3 %
BUN SERPL-MCNC: 10 MG/DL (ref 7–30)
CALCIUM SERPL-MCNC: 8.6 MG/DL (ref 8.5–10.1)
CHLORIDE SERPL-SCNC: 110 MMOL/L (ref 94–109)
CO2 SERPL-SCNC: 23 MMOL/L (ref 20–32)
CREAT SERPL-MCNC: 0.8 MG/DL (ref 0.52–1.04)
DIFFERENTIAL METHOD BLD: NORMAL
EOSINOPHIL # BLD AUTO: 0.1 10E9/L (ref 0–0.7)
EOSINOPHIL NFR BLD AUTO: 0.8 %
ERYTHROCYTE [DISTWIDTH] IN BLOOD BY AUTOMATED COUNT: 12.4 % (ref 10–15)
GFR SERPL CREATININE-BSD FRML MDRD: 79 ML/MIN/{1.73_M2}
GLUCOSE SERPL-MCNC: 97 MG/DL (ref 70–99)
HCT VFR BLD AUTO: 38.8 % (ref 35–47)
HGB BLD-MCNC: 13 G/DL (ref 11.7–15.7)
IMM GRANULOCYTES # BLD: 0 10E9/L (ref 0–0.4)
IMM GRANULOCYTES NFR BLD: 0.1 %
LYMPHOCYTES # BLD AUTO: 2.6 10E9/L (ref 0.8–5.3)
LYMPHOCYTES NFR BLD AUTO: 28 %
MCH RBC QN AUTO: 29.7 PG (ref 26.5–33)
MCHC RBC AUTO-ENTMCNC: 33.5 G/DL (ref 31.5–36.5)
MCV RBC AUTO: 89 FL (ref 78–100)
MONOCYTES # BLD AUTO: 0.4 10E9/L (ref 0–1.3)
MONOCYTES NFR BLD AUTO: 4.8 %
NEUTROPHILS # BLD AUTO: 6 10E9/L (ref 1.6–8.3)
NEUTROPHILS NFR BLD AUTO: 66 %
NRBC # BLD AUTO: 0 10*3/UL
NRBC BLD AUTO-RTO: 0 /100
PLATELET # BLD AUTO: 249 10E9/L (ref 150–450)
POTASSIUM SERPL-SCNC: 3.2 MMOL/L (ref 3.4–5.3)
RBC # BLD AUTO: 4.37 10E12/L (ref 3.8–5.2)
SODIUM SERPL-SCNC: 142 MMOL/L (ref 133–144)
WBC # BLD AUTO: 9.1 10E9/L (ref 4–11)

## 2019-04-13 PROCEDURE — 80048 BASIC METABOLIC PNL TOTAL CA: CPT | Performed by: EMERGENCY MEDICINE

## 2019-04-13 PROCEDURE — 94640 AIRWAY INHALATION TREATMENT: CPT

## 2019-04-13 PROCEDURE — 36415 COLL VENOUS BLD VENIPUNCTURE: CPT | Performed by: EMERGENCY MEDICINE

## 2019-04-13 PROCEDURE — 71046 X-RAY EXAM CHEST 2 VIEWS: CPT

## 2019-04-13 PROCEDURE — 96372 THER/PROPH/DIAG INJ SC/IM: CPT

## 2019-04-13 PROCEDURE — 25000128 H RX IP 250 OP 636: Performed by: EMERGENCY MEDICINE

## 2019-04-13 PROCEDURE — 99285 EMERGENCY DEPT VISIT HI MDM: CPT | Mod: 25

## 2019-04-13 PROCEDURE — 25000125 ZZHC RX 250: Performed by: EMERGENCY MEDICINE

## 2019-04-13 PROCEDURE — 85025 COMPLETE CBC W/AUTO DIFF WBC: CPT | Performed by: EMERGENCY MEDICINE

## 2019-04-13 RX ORDER — PREDNISONE 20 MG/1
60 TABLET ORAL ONCE
Status: DISCONTINUED | OUTPATIENT
Start: 2019-04-13 | End: 2019-04-13 | Stop reason: SINTOL

## 2019-04-13 RX ORDER — DICYCLOMINE HCL 20 MG
20 TABLET ORAL EVERY 6 HOURS
Qty: 40 TABLET | Refills: 0 | Status: SHIPPED | OUTPATIENT
Start: 2019-04-13 | End: 2019-04-14

## 2019-04-13 RX ORDER — PREDNISONE 20 MG/1
TABLET ORAL
Qty: 10 TABLET | Refills: 0 | Status: SHIPPED | OUTPATIENT
Start: 2019-04-13 | End: 2019-04-14

## 2019-04-13 RX ORDER — AZITHROMYCIN 250 MG/1
TABLET, FILM COATED ORAL
Qty: 6 TABLET | Refills: 0 | Status: SHIPPED | OUTPATIENT
Start: 2019-04-13 | End: 2019-04-14

## 2019-04-13 RX ORDER — DEXAMETHASONE SODIUM PHOSPHATE 10 MG/ML
10 INJECTION, SOLUTION INTRAMUSCULAR; INTRAVENOUS ONCE
Status: DISCONTINUED | OUTPATIENT
Start: 2019-04-13 | End: 2019-04-13

## 2019-04-13 RX ORDER — IPRATROPIUM BROMIDE AND ALBUTEROL SULFATE 2.5; .5 MG/3ML; MG/3ML
3 SOLUTION RESPIRATORY (INHALATION)
Status: COMPLETED | OUTPATIENT
Start: 2019-04-13 | End: 2019-04-13

## 2019-04-13 RX ORDER — DEXAMETHASONE SODIUM PHOSPHATE 10 MG/ML
10 INJECTION, SOLUTION INTRAMUSCULAR; INTRAVENOUS ONCE
Status: COMPLETED | OUTPATIENT
Start: 2019-04-13 | End: 2019-04-13

## 2019-04-13 RX ADMIN — DEXAMETHASONE SODIUM PHOSPHATE 10 MG: 10 INJECTION, SOLUTION INTRAMUSCULAR; INTRAVENOUS at 12:56

## 2019-04-13 RX ADMIN — IPRATROPIUM BROMIDE AND ALBUTEROL SULFATE 3 ML: .5; 3 SOLUTION RESPIRATORY (INHALATION) at 12:17

## 2019-04-13 RX ADMIN — IPRATROPIUM BROMIDE AND ALBUTEROL SULFATE 3 ML: .5; 3 SOLUTION RESPIRATORY (INHALATION) at 12:15

## 2019-04-13 RX ADMIN — IPRATROPIUM BROMIDE AND ALBUTEROL SULFATE 3 ML: .5; 3 SOLUTION RESPIRATORY (INHALATION) at 11:51

## 2019-04-13 ASSESSMENT — ENCOUNTER SYMPTOMS
FEVER: 0
VOMITING: 0
CONSTIPATION: 0
DIARRHEA: 1
DYSURIA: 0
ABDOMINAL PAIN: 1
FREQUENCY: 0
DIFFICULTY URINATING: 0
HEMATURIA: 0
COUGH: 1
NAUSEA: 0

## 2019-04-13 NOTE — ED AVS SNAPSHOT
[facility] Emergency Department  [address]  [facility] [facility] [address]  Phone:  [phone]  Fax:  [phone]                                    Lilliana Cardenas   MRN: [id number]    Department:  [facility] Emergency Department   Date of Visit:  4/13/2019           After Visit Summary Signature Page    I have received my discharge instructions, and my questions have been answered. I have discussed any challenges I see with this plan with the nurse or doctor.    ..........................................................................................................................................  Patient/Patient Representative Signature      ..........................................................................................................................................  Patient Representative Print Name and Relationship to Patient    ..................................................               ................................................  Date                                   Time    ..........................................................................................................................................  Reviewed by Signature/Title    ...................................................              ..............................................  Date                                               Time          22EPIC Rev 08/18

## 2019-04-13 NOTE — ED NOTES
MD notfied of discussion with  and states patient OK to discharge without definitve plan for shelter placement. Patient discharged to lobby per MD order.

## 2019-04-13 NOTE — ED PROVIDER NOTES
History     Chief Complaint:  Cough, abdominal pain    HPI   Lilliana Cardenas is a 63 year old female with a history of IBS, HTN, diabetes, and chronic pain syndrome who presents with abdominal pain, diarrhea, and cough. The patient states that she has had an intermittent cough for the last few months and she has been using inhalers to treat the cough. She also reports that for the last two days she has had intermittent abdominal pain and diarrhea. Her diarrhea is loose and not watery. She denies any fevers, blood in her stool, nausea, vomiting, or urinary symptoms. The patient notes that her homeless shelter was closed a few days ago so she did not eat well that day.     Allergies:  Lactose  Pollen Extract  Prednisone     Medications:    Proair  Norvasc  Bentyl  Cymbalta  Advair  Neurontin  Atarax  Cozaar  Toprol  Prilosec  Ditropan  Mirapex  Requip   Imitrex  Topamax  Desyrel  Percocet     Past Medical History:    Anemia  Anxiety  Chronic pain syndrome  Depressive disorder  Diabetes mellitus  HTN  Neuropathy  Overdose of narcotic   Opiate dependence  IBS    Past Surgical History:    GYN surgery  cholecystectomy    Family History:    No past pertinent family history.    Social History:  Patient presents   Current smoker  Negative for alcohol use.  Marital Status:       Review of Systems   Constitutional: Negative for fever.   Respiratory: Positive for cough.    Gastrointestinal: Positive for abdominal pain and diarrhea. Negative for constipation, nausea and vomiting.   Genitourinary: Negative for difficulty urinating, dysuria, frequency and hematuria.   All other systems reviewed and are negative.      Physical Exam     Patient Vitals for the past 24 hrs:   BP Temp Temp src Pulse Heart Rate Resp SpO2   04/13/19 1256 158/85 -- -- 100 -- 20 100 %   04/13/19 1216 189/73 -- -- 82 -- 20 100 %   04/13/19 1110 (!) 175/95 97.7  F (36.5  C) Oral -- 105 18 100 %         Physical Exam   General: Alert, no acute  distress; nontoxic appearing  HEENT:  Moist mucous membranes.  Posterior oropharynx clear, no exudates.  Conjunctiva normal.   CV:  RRR, no m/r/g, skin warm and well perfused  Pulm:  Diffuse wheezes throughout both lung fields ; No acute distress, breathing comfortably  GI:  Soft, nontender, nondistended.  No rebound or guarding.  Normal bowel sounds  MSK:  Moving all extremities.  No focal areas of edema, erythema, or tenderness  Skin:  WWP, no rashes, no lower extremity edema, skin color normal, no diaphoresis  Psych:  Well-appearing, normal affect, regular speech      Emergency Department Course   Imaging:  Radiographic findings were communicated with the patient who voiced understanding of the findings.  X-ray Chest, 2 views:  Heart size is normal. Mildly tortuous aorta. Pulmonary  vasculature is not distended. No focal infiltrates or evidence of  pleural fluid. Moderate kyphosis at the thoracolumbar junction. The  upper end of the fusion of the lumbar spine is seen.  Result per radiology.      Laboratory:  BMP: Potassium: 3.2 (L), Chloride: 110 (H), o/w WNL (Creatinine: 0.80)(Glucose: 97)  CBC: WBC: 9.1, HGB: 13.0, PLT: 249    Interventions:  1217 - Albuterol-Ipratropium 2.5mg-0.5mg/3ml, inhalation solution, Nebulizer  1256 - Decadron 10 mg IM    Emergency Department Course:  Nursing notes and vitals reviewed.  1132: I performed an exam of the patient as documented above.     1238: I rechecked the patient. Findings and plan explained to the Patient. Patient discharged home with instructions regarding supportive care, medications, and reasons to return. The importance of close follow-up was reviewed.     Impression & Plan    Medical Decision Making:  Lilliana Cardenas is a 63 year old female with history significant for IBS, asthma presents emergency department with intermittent abdominal pain and diarrhea for the last 2 days and mild productive coughing with wheezing for several months.  She denies any  fevers or bloody stools.  Vital signs reviewed and are stable.  She has wheezing on exam.  Doubt ACS or PE causing her symptoms based on history and exam.  Wheezing improved with neb treatment.  In regards to her abdominal pain, she has no abdominal tenderness whatsoever.  I do not feel that she requires any advanced imaging.  Labs are largely unremarkable.  Given history of IBS and intermittent abdominal pain, suspect this may be a flare of IBS.  I feel she is otherwise safe for discharge with close follow-up with her primary care provider in regards to her presentation today.  Patient requested single dose of steroid instead of short course of prednisone and therefore Decadron was given.  I will prescribe Bentyl for her abdominal complaints.  Doubt bacterial cause for her diarrhea or C. difficile.  All questions answered prior to patient's discharge.  She will return for any new or worsening symptoms discussed.    Diagnosis:    ICD-10-CM    1. Atypical pneumonia J18.9    2. Acute bronchospasm J98.01    3. Generalized abdominal pain R10.84    4. Diarrhea, unspecified type R19.7        Disposition:  discharged to home    Discharge Medications:  New Prescriptions    AZITHROMYCIN (ZITHROMAX Z-MARIA DEL CARMEN) 250 MG TABLET    Two tablets on the first day, then one tablet daily for the next 4 days    DICYCLOMINE (BENTYL) 20 MG TABLET    Take 1 tablet (20 mg) by mouth every 6 hours for 10 days       IBola, am serving as a scribe on 4/13/2019 at 11:32 AM to personally document services performed by Dominik Cotter MD based on my observations and the provider's statements to me.         Bola Cullen  4/13/2019   [facility] EMERGENCY DEPARTMENT       Dominik Cotter MD  04/13/19 3196

## 2019-04-13 NOTE — DISCHARGE INSTRUCTIONS
Discharge Instructions  Bronchitis, Pneumonia, Bronchospasm    You were seen today for a chest infection or inflammation. If your provider decided this was due to a bacterial infection, you may need an antibiotic. Sometimes these are caused by a virus, and then an antibiotic will not help.     Generally, every Emergency Department visit should have a follow-up clinic visit with either a primary or a specialty clinic/provider. Please follow-up as instructed by your emergency provider today.    Return to the Emergency Department if:  Your breathing gets much worse.  You are very weak, or feel much more ill.  You develop new symptoms, such as chest pain.  You cough up blood.  You are vomiting (throwing up) enough that you cannot keep fluids or your medicine down.    What can I do to help myself?  Fill any prescriptions the provider gave you and take them right away--especially antibiotics. Be sure to finish the whole antibiotic prescription.  You may be given a prescription for an inhaler, which can help loosen tight air passages.  Use this as needed, but not more often than directed. Inhalers work much better when used with a spacer.   You may be given a prescription for a steroid to reduce inflammation. Used long-term, these can have side effects, but for short-term use they are safe. You may notice restlessness or increased appetite.      You may use non-prescription cough or cold medicines. Cough medicines may help, but don?t make the cough go away completely.   Avoid smoke, because this can make your symptoms worse. If you smoke, this may be a good time to quit! Consider using nicotine lozenges, gum, or patches to reduce cravings.   If you have a fever, Tylenol  (acetaminophen), Motrin  (ibuprofen), or Advil  (ibuprofen) may help bring fever down and may help you feel more comfortable. Be sure to read and follow the package directions, and ask your provider if you have questions.  Be sure to get your flu shot each  year.  For certain ages, the pneumonia shot can help prevent pneumonia.  If you were given a prescription for medicine here today, be sure to read all of the information (including the package insert) that comes with your prescription.  This will include important information about the medicine, its side effects, and any warnings that you need to know about.  The pharmacist who fills the prescription can provide more information and answer questions you may have about the medicine.  If you have questions or concerns that the pharmacist cannot address, please call or return to the Emergency Department.     Remember that you can always come back to the Emergency Department if you are not able to see your regular provider in the amount of time listed above, if you get any new symptoms, or if there is anything that worries you.        Discharge Instructions  Abdominal Pain    Abdominal pain (belly pain) can be caused by many things. Your evaluation today does not show the exact cause for your pain. Your provider today has decided that it is unlikely your pain is due to a life threatening problem, or a problem requiring surgery or hospital admission. Sometimes those problems cannot be found right away, so it is very important that you follow up as directed.  Sometimes only the changes which occur over time allow the cause of your pain to be found.    Generally, every Emergency Department visit should have a follow-up clinic visit with either a primary or a specialty clinic/provider. Please follow-up as instructed by your emergency provider today. With abdominal pain, we often recommend very close follow-up, such as the following day.    ADULTS:  Return to the Emergency Department right away if:    You get an oral temperature above 102oF or as directed by your provider.  You have blood in your stools. This may be bright red or appear as black, tarry stools.    You keep vomiting (throwing up) or cannot drink liquids.  You  see blood when you vomit.   You cannot have a bowel movement or you cannot pass gas.  Your stomach gets bloated or bigger.  Your skin or the whites of your eyes look yellow.  You faint.  You have bloody, frequent or painful urination (peeing).  You have new symptoms or anything that worries you.    CHILDREN:  Return to the Emergency Department right away if your child has any of the above-listed symptoms or the following:    Pushes your hand away or screams/cries when his/her belly is touched.  You notice your child is very fussy or weak.  Your child is very tired and is too tired to eat or drink.  Your child is dehydrated.  Signs of dehydration can be:  Significant change in the amount of wet diapers/urine.  Your infant or child starts to have dry mouth and lips, or no saliva (spit) or tears.    PREGNANT WOMEN:  Return to the Emergency Department right away if you have any of the above-listed symptoms or the following:    You have bleeding, leaking fluid or passing tissue from the vagina.  You have worse pain or cramping, or pain in your shoulder or back.  You have vomiting that will not stop.  You have a temperature of 100oF or more.  Your baby is not moving as much as usual.  You faint.  You get a bad headache with or without eye problems and abdominal pain.  You have a seizure.  You have unusual discharge from your vagina and abdominal pain.    Abdominal pain is pretty common during pregnancy.  Your pain may or may not be related to your pregnancy. You should follow-up closely with your OB provider so they can evaluate you and your baby.  Until you follow-up with your regular provider, do the following:     Avoid sex and do not put anything in your vagina.  Drink clear fluids.  Only take medications approved by your provider.    MORE INFORMATION:    Appendicitis:  A possible cause of abdominal pain in any person who still has their appendix is acute appendicitis. Appendicitis is often hard to diagnose.  Testing  "does not always rule out early appendicitis or other causes of abdominal pain. Close follow-up with your provider and re-evaluations may be needed to figure out the reason for your abdominal pain.    Follow-up:  It is very important that you make an appointment with your clinic and go to the appointment.  If you do not follow-up with your primary provider, it may result in missing an important development which could result in permanent injury or disability and/or lasting pain.  If there is any problem keeping your appointment, call your provider or return to the Emergency Department.    Medications:  Take your medications as directed by your provider today.  Before using over-the-counter medications, ask your provider and make sure to take the medications as directed.  If you have any questions about medications, ask your provider.    Diet:  Resume your normal diet as much as possible, but do not eat fried, fatty or spicy foods while you have pain.  Do not drink alcohol or have caffeine.  Do not smoke tobacco.    Probiotics: If you have been given an antibiotic, you may want to also take a probiotic pill or eat yogurt with live cultures. Probiotics have \"good bacteria\" to help your intestines stay healthy. Studies have shown that probiotics help prevent diarrhea (loose stools) and other intestine problems (including C. diff infection) when you take antibiotics. You can buy these without a prescription in the pharmacy section of the store.     If you were given a prescription for medicine here today, be sure to read all of the information (including the package insert) that comes with your prescription.  This will include important information about the medicine, its side effects, and any warnings that you need to know about.  The pharmacist who fills the prescription can provide more information and answer questions you may have about the medicine.  If you have questions or concerns that the pharmacist cannot " address, please call or return to the Emergency Department.       Remember that you can always come back to the Emergency Department if you are not able to see your regular provider in the amount of time listed above, if you get any new symptoms, or if there is anything that worries you.            Discharge Instructions  Adult Diarrhea    You have been seen today for diarrhea (loose stools). This is usually caused by a virus, but some bacteria, parasites, medicines, or other medical conditions can cause similar symptoms. At this time your provider does not find that your diarrhea is a sign of anything dangerous or life-threatening. However, sometimes the signs of serious illness do not show up right away. If you have new or worse symptoms, you may need to be seen again in the Emergency Department or by your primary provider.     Generally, every Emergency Department visit should have a follow-up clinic visit with either a primary or a specialty clinic/provider. Please follow-up as instructed by your emergency provider today.    Return to the Emergency Department if:  You feel you are getting dehydrated, such as being very thirsty, not urinating (peeing) like usual, or feeling faint or lightheaded.   You develop a new fever.  You have abdominal (belly) pain that seems worse than cramps, is in one spot, or is getting worse over time.   You have blood in your stool or your stool becomes black.  (Remember that if you take Pepto-Bismol , this will turn your stool black).   You feel very weak.    What can I do to help myself?  The most important thing to do is to drink clear liquids.   It is best to have only small, frequent sips of liquids. Drinking too much at once may cause more diarrhea. You should also replace minerals, sodium and potassium lost with diarrhea. Pedialyte  and sports drinks can help you replace these minerals. You can also drink clear liquids such as water, weak tea, apple juice, and 7-Up . Avoid  "acidic liquids (orange juice), caffeine (coffee) or alcohol. Milk products will make the diarrhea worse.  Eat bland (plain) foods. Soda crackers, toast, plain noodles, gelatin, applesauce and bananas are good first choices. Avoid foods that have acid, are spicy, fatty or fibrous (such as meats, coarse grains, vegetables). You may start eating these foods again in about 3 days when you are better.   Sometimes treatment includes prescription medicine to prevent diarrhea. If your provider prescribes these for you, take them as directed.   Nonprescription medicine is available for the treatment of diarrhea and can be very effective. If you use it, make sure you use the dose recommended on the package. Check with your healthcare provider before you use any medicine for diarrhea.   Do not take ibuprofen, or other nonsteroidal anti-inflammatory medicines, without checking with your healthcare provider.   Probiotics: If you have been given an antibiotic, you may want to also take a probiotic pill or eat yogurt with live cultures. Probiotics have \"good bacteria\" to help your intestines stay healthy. Studies have shown that probiotics help prevent diarrhea and other intestine problems (including C. diff infection) when you take antibiotics. You can buy these without a prescription in the pharmacy section of the store.   If you were given a prescription for medicine here today, be sure to read all of the information (including the package insert) that comes with your prescription.  This will include important information about the medicine, its side effects, and any warnings that you need to know about.  The pharmacist who fills the prescription can provide more information and answer questions you may have about the medicine.  If you have questions or concerns that the pharmacist cannot address, please call or return to the Emergency Department.  Remember that you can always come back to the Emergency Department if you are " not able to see your regular provider in the amount of time listed above, if you get any new symptoms, or if there is anything that worries you.

## 2019-04-13 NOTE — ED NOTES
Patient states she is homeless and her shelter closed down and she has nowhere to go. Patient is requesting to talk to a .  paged by EMERSON.   ABC intact alert and no distress.

## 2019-04-13 NOTE — PROGRESS NOTES
SWS Note:    Received a page from ER stating that pt is homeless and requesting to talk to SW. I completed a chart review and it appears that pt has presented several times for same issues such as housing, MH and CD.  It appears she may also be med seeking at times.  It appears that on 4/10/19 a referral was made to CC d/t housing issues however pt never responded to that call.  I provided the RN with [facility] Emergency shelter phone number to offer pt.

## 2019-04-14 RX ORDER — PREDNISONE 20 MG/1
TABLET ORAL
Qty: 10 TABLET | Refills: 0 | Status: SHIPPED | OUTPATIENT
Start: 2019-04-14 | End: 2019-05-09

## 2019-04-14 RX ORDER — AZITHROMYCIN 250 MG/1
TABLET, FILM COATED ORAL
Qty: 6 TABLET | Refills: 0 | Status: SHIPPED | OUTPATIENT
Start: 2019-04-14 | End: 2019-05-02

## 2019-04-14 RX ORDER — DICYCLOMINE HCL 20 MG
20 TABLET ORAL EVERY 6 HOURS
Qty: 40 TABLET | Refills: 0 | Status: SHIPPED | OUTPATIENT
Start: 2019-04-14 | End: 2019-05-09

## 2019-04-16 ENCOUNTER — PATIENT OUTREACH (OUTPATIENT)
Dept: CARE COORDINATION | Facility: CLINIC | Age: 64
End: 2019-04-16

## 2019-04-16 NOTE — LETTER
[place] [facility]  [address] [place] [address]      April 17, 2019    Lilliana Cardenas  [address]   [address]  [facility] [id number]      Dear Lilliana,    I am a clinic care coordinator who works at [facility]. I recently tried to call and was unable to reach you. I wanted to introduce myself and provide you with my contact information so that you can call me with questions or concerns about your health care. Below is a description of clinic care coordination and how I can further assist you.     The clinic care coordinator is a registered nurse and/or  who understand the health care system. The goal of clinic care coordination is to help you manage your health and improve access to the [place] system in the most efficient manner. The registered nurse can assist you in meeting your health care goals by providing education, coordinating services, and strengthening the communication among your providers. The  can assist you with financial, behavioral, psychosocial, chemical dependency, counseling, and/or psychiatric resources.    Please feel free to contact me at [phone], with any questions or concerns.      Sincerely,     Andreea Harris

## 2019-04-16 NOTE — PROGRESS NOTES
Clinic Care Coordination Contact  [facility]/Voicemail       Clinical Data: Care Coordinator Outreach  Outreach attempted x 2.  Left message on voicemail with call back information and requested return call. Please see contact log for detailed outreach attempt dates.  Plan: Care Coordinator will mail out care coordination introduction/unable to contact letter with care coordinator contact information and explanation of care coordination services. Care Coordinator will do no further outreaches at this time.    RADHA Mccauley  Clinic Care Coordinator  [phone]

## 2019-04-17 ENCOUNTER — TELEPHONE (OUTPATIENT)
Dept: INTERNAL MEDICINE | Facility: CLINIC | Age: 64
End: 2019-04-17

## 2019-04-17 NOTE — LETTER
[facility]  [address], [address]  [place], [place]  [address]                                            TEL:[phone]  FAX:[phone]      Lilliana Cardenas  [address]   [address]  [facility] [id number]      April 17, 2019    Dear Lilliana,    At [facility] we care about your health and well-being.  A review of your chart has indicated that you are due for a Mammogram.  Please contact us at [phone] to schedule an appointment.    If you have already had one or all of the above screening tests at another facility, please call us to update your chart.     Sincerely,      Your [facility] care team.

## 2019-04-17 NOTE — TELEPHONE ENCOUNTER
Panel Management Review      Patient has the following on her problem list: None      Composite cancer screening  Chart review shows that this patient is due/due soon for the following Mammogram  Summary:    Patient is due/failing the following:   MAMMOGRAM    Action needed:   Patient needs referral/order: Mammogram    Type of outreach:    Sent letter.    Questions for provider review:    None                                                                                                                                    CELI HUDSON CMA       Chart routed to no one .

## 2019-05-02 ENCOUNTER — OFFICE VISIT (OUTPATIENT)
Dept: PALLIATIVE MEDICINE | Facility: CLINIC | Age: 64
End: 2019-05-02
Payer: MEDICARE

## 2019-05-02 VITALS
SYSTOLIC BLOOD PRESSURE: 136 MMHG | HEIGHT: 62 IN | DIASTOLIC BLOOD PRESSURE: 79 MMHG | WEIGHT: 175 LBS | OXYGEN SATURATION: 100 % | HEART RATE: 94 BPM | BODY MASS INDEX: 32.2 KG/M2

## 2019-05-02 DIAGNOSIS — G89.4 CHRONIC PAIN SYNDROME: ICD-10-CM

## 2019-05-02 DIAGNOSIS — M79.18 MYOFASCIAL PAIN: ICD-10-CM

## 2019-05-02 DIAGNOSIS — M79.7 FIBROMYALGIA: ICD-10-CM

## 2019-05-02 DIAGNOSIS — M96.1 FAILED BACK SURGICAL SYNDROME: Primary | ICD-10-CM

## 2019-05-02 DIAGNOSIS — M47.816 SPONDYLOSIS OF LUMBAR REGION WITHOUT MYELOPATHY OR RADICULOPATHY: ICD-10-CM

## 2019-05-02 PROCEDURE — 99214 OFFICE O/P EST MOD 30 MIN: CPT | Performed by: PHYSICAL MEDICINE & REHABILITATION

## 2019-05-02 RX ORDER — OXYCODONE AND ACETAMINOPHEN 5; 325 MG/1; MG/1
1 TABLET ORAL EVERY 6 HOURS PRN
Qty: 90 TABLET | Refills: 0 | Status: SHIPPED | OUTPATIENT
Start: 2019-05-02 | End: 2019-08-01

## 2019-05-02 RX ORDER — OXYCODONE AND ACETAMINOPHEN 5; 325 MG/1; MG/1
TABLET ORAL
Qty: 90 TABLET | Refills: 0 | Status: SHIPPED | OUTPATIENT
Start: 2019-06-02 | End: 2019-06-28

## 2019-05-02 RX ORDER — OXYCODONE AND ACETAMINOPHEN 5; 325 MG/1; MG/1
TABLET ORAL
Qty: 90 TABLET | Refills: 0 | Status: SHIPPED | OUTPATIENT
Start: 2019-05-02 | End: 2019-05-02

## 2019-05-02 ASSESSMENT — MIFFLIN-ST. JEOR: SCORE: 1312.04

## 2019-05-02 NOTE — PATIENT INSTRUCTIONS
1. I wrote two one month prescriptions of percocet 5-325. Take this 3 times daily as needed.  2. Continue the other medications at the current doses.  3.     ----------------------------------------------------------------  Clinic Number:  [phone]   Call this number with any questions about your care and for scheduling assistance. Calls are returned Monday through Friday between 8 AM and 4:30 PM. We usually get back to you within 2 business days depending on the issue/request.       Medication refills:    For non-opioid medications, call your pharmacy directly to request a refill. The pharmacy will contact the [facility] for authorization. Please allow 3-4 days for these refills to be processed.     For opioid refills, call the clinic number or send a [email or web address] message. Please contact us 7-10 days before your refill is due. The message MUST include the name of the specific medication(s) requested and how you would like to receive the prescription(s). The options are as follows:    Pain Clinic staff can mail the prescription to your pharmacy. Please tell us the name of the pharmacy.    You may pick the prescription up at the Pain Clinic (tell us the location) or during a clinic visit with your pain provider    Pain Clinic staff can deliver the prescription to the [facility] pharmacy in the clinic building. Please tell us the location.      We believe regular attendance is key to your success in our program.    Any time you are unable to keep your appointment we ask that you call us at least 24 hours in advance to let us know. This will allow us to offer the appointment time to another patient.

## 2019-05-02 NOTE — PROGRESS NOTES
[facility]    Date of visit: 5/2/19    Chief complaint:   Chief Complaint   Patient presents with     Pain Management       Interval history:  Lilliana Cardenas is a 63 year old female last seen by me on 3/20/19.      Recommendations/plan at the last visit included:  1. Physical Therapy: Chronic pain PT referral placed. She wasn't able to make the last appointment due to her living situation.  2. Clinical Health Pain Psychologist: Would certainly benefit from biofeed back/coping strategies. Recently started working with a psychologist on stress management. Will place referral to pain phd once she has completed this.  3. Self Care Recommendations: Gentle progressive exercise that does not increase pain - gradually increase daily walking program.  Take mini breaks - 5 minutes of mindfullness a couple times a day.   4. Diagnostic Studies: none at this time  5. Workup - Bilateral knee XR ordered today.  6. Medication Management:   1. Oxycodone 5/325 every 6 hours prn - max 3/day.  2. Recommended against using carisoprodol, she has not refilled this medication.  3. Continue duloxetine 60mg daily.  4. Continue Gabapentin 800mg TID  5. Topamax 75mg TID  6. Sumatriptan 50mg prn  7. Further procedures recommended:   1. Bilateral greater trochanter bursa injections performed in clinic on 10/23/18, can repeat this in the future if needed.  2. Caudal epidural with significant relief, can be repeated every 3 months as needed.  3. Consider bilateral knee injection in the future, consider synvisc use instead of steroid to decrease steroid burden.  8. Referrals: none  9. Follow up: 4 weeks in clinic       Since her last visit, Lilliana Cardenas reports:  -She had bilateral bursa injections on 3/25 with significant pain relief. She has been able to walk more with her walker instead of using the wheelchair as much.  -She had treatment for pneumonia in April but has improved since  then.  -She is still homeless and staying in various churches for 1-2 week intervals.  -She has been using methocarbamol and this has been helpful. Especially helpful at night for her leg spasms.  -The requip and mirapex does help with her rls.  -She continues using percocet 5-325 three times daily.  -The pain in her low back and legs has been steadily worsening over the past month, this was significantly better after her caudal epidural in December 2018.      Pain scores:  Pain intensity on average is 7 on a scale of 0-10.     Current pain treatments:   -Hydroxyzine 25 mg QID prn  -Gabapentin 800mg TID  -Trazodone 50mg hs prn (3-4 times/week)  -Imitrex 50mg prn  -Cymbalta 60mg qd  -Percocet 5/235 tablets q6h prn (takes 3 tablets/day)  -Topiramate 75mg TID    Past pain treatments:  Medications:  -Carisoprodol 250mg QID prn    2. Physical Therapy: Did PT in march              TENS unit: helps a little, doesn't have it with her.   3. Pain psychology: did this at [facility] was helpful  4. Surgery: SCSx2, lumbar surgeries x5 with decompression and fusion in 2009  5. Injections: epidurals were helpful, last was 4 years ago  6. Alternative Therapies:               Chiropractic: didn't like               Acupuncture: helpful           Side Effects: no side effect    Medications:  Current Outpatient Medications   Medication Sig Dispense Refill     albuterol (PROAIR HFA/PROVENTIL HFA/VENTOLIN HFA) 108 (90 Base) MCG/ACT inhaler Inhale 2 puffs into the lungs 4 times daily as needed for other (dyspnea) 1 Inhaler 1     amLODIPine (NORVASC) 10 MG tablet Take 1 tablet (10 mg) by mouth daily 30 tablet 1     dicyclomine (BENTYL) 20 MG tablet Take 1 tablet (20 mg) by mouth every 6 hours 90 tablet 3     DULoxetine (CYMBALTA) 60 MG capsule Take 1 capsule (60 mg) by mouth daily 90 capsule 3     fluticasone-salmeterol (ADVAIR) 250-50 MCG/DOSE inhaler Inhale 1 puff into the lungs every 12 hours 1 Inhaler 3     gabapentin (NEURONTIN) 600 MG  tablet Take 2 tablets (1,200 mg) by mouth 3 times daily 180 tablet 3     hydrOXYzine (ATARAX) 25 MG tablet TAKE 1 TABLET BY MOUTH FOUR TIMES A DAY AS NEEDED FOR ITCHING 120 tablet 1     ketoprofen 10% in PLO 10% topical gel Place 2-4 g onto the skin every 4 hours as needed for moderate pain 200 g 1     losartan (COZAAR) 25 MG tablet Take 1 tablet (25 mg) by mouth daily 90 tablet 3     methocarbamol (ROBAXIN) 500 MG tablet Take 1 tablet (500 mg) by mouth 3 times daily as needed for muscle spasms 90 tablet 1     omeprazole (PRILOSEC) 20 MG DR capsule Take 20 mg by mouth daily       order for DME Equipment being ordered: BP monitor 1 each 0     order for DME Equipment being ordered: four wheeled walker with chair 1 Units 0     oxybutynin ER (DITROPAN-XL) 5 MG 24 hr tablet Take 1 tablet (5 mg) by mouth daily 90 tablet 3     oxyCODONE-acetaminophen (PERCOCET) 5-325 MG tablet 1 tab every 6 hours PRN, max 3 tab/day. May fill on 3/25 to start on/after 3/27/19 90 tablet 0     pramipexole (MIRAPEX) 0.125 MG tablet Take 1 tablet (0.125 mg) by mouth At Bedtime 90 tablet 0     rOPINIRole (REQUIP) 1 MG tablet Take 1 tablet (1 mg) by mouth 3 times daily 90 tablet 3     SUMAtriptan (IMITREX) 50 MG tablet TAKE 1 TAB AT ONSET OF MIGRAINE. MAY REPEAT AFTER 2 HRS. MAX 2 TABS/DAY 21 tablet 0     traZODone (DESYREL) 50 MG tablet Take 1 tablet (50 mg) by mouth nightly as needed 30 tablet 2     Carisoprodol 250 MG TABS Take 250 mg by mouth 4 times daily as needed (Patient not taking: Reported on 3/20/2019) 16 tablet 0     dicyclomine (BENTYL) 20 MG tablet Take 1 tablet (20 mg) by mouth every 6 hours (Patient not taking: Reported on 5/2/2019) 40 tablet 0     gabapentin (NEURONTIN) 300 MG capsule Take 1 capsule (300 mg) by mouth At Bedtime (Patient not taking: Reported on 3/20/2019) 90 capsule 3     metoprolol succinate ER (TOPROL-XL) 25 MG 24 hr tablet Take 1 tablet (25 mg) by mouth daily (Patient not taking: Reported on 3/20/2019) 7 tablet  "0     predniSONE (DELTASONE) 20 MG tablet Take two tablets (= 40mg) each day for 5 (five) days. (Patient not taking: Reported on 5/2/2019.) 10 tablet 0     predniSONE (DELTASONE) 20 MG tablet Take two tablets (= 40mg) each day for 5 (five) days. (Patient not taking: Reported on 3/20/2019.) 10 tablet 0     topiramate (TOPAMAX) 25 MG tablet Take 3 tablets (75 mg) by mouth 3 times daily (Patient taking differently: Take 50 mg by mouth 3 times daily ) 270 tablet 0       Medical History: any changes in medical history since they were last seen? Yes  Treated for pneumonia.    Review of Systems:  The 14 system ROS was reviewed from the intake questionnaire, and is positive for:  lower extremity edema, hypertension, joint pain, arthritis, stiffness, back pain, neck pain, paresthesias, weakness  Any bowel or bladder problems: frequency, urgency, incontinence.  Mood: depression, anxiety, stress    Physical Exam:  Blood pressure 136/79, pulse 94, height 1.575 m (5' 2\"), weight 79.4 kg (175 lb), SpO2 100 %.  General: NAD, pleasant  Gait: Unsteady, uses powered mobility.  MSK exam: Lumbar ROM is moderately reduced in all planes. Bilateral lumbar paraspinal tenderness. Positive straight leg raise bilaterally. Strength is 5/5 and symmetric in the lower extrmities.    Assessment:   Ms. Tijerina is a 63 year old with past medical history including DM, HTN, anxiety depression and chronic pain who presents for evaluation and treatment of the following chronic pain conditions:     1. Chronic low back and leg pain: History of several lumbar surgeries with ongoing pain. She has had spinal cord stimulators in the past with good relief but they were removed due to discomfort from the battery. Her last lumbar surgery was a decompression and fusion in 2009, no spinal cord stimulator after this. She has had persisting pain since the 2009 surgery which is consistent with lumbar spondylosis and radiculopathy. She also has arachnoiditis seen on " prior MRI/CT scan which is also likely contributing to her symptoms. Caudal epidural on 12/19/18 with significant pain relief.     2. Chronic bilateral hip pain:  The pain is likely due to gluteal muscle dysfunction/greater trochanter bursitis and IT band syndrome. Bilateral greater trochanter bursa injections performed in clinic on 10/23/18 with 4+ months of pain relief and performed again on 3/25/19 with significant benefit.     3. Fibromyalgia: Meets ACR criteria for fibromyalgia. Has tried many medications in the past and is currently on gabapentin 800mg with benefit. Was started on duloxetine which was increased to 60mg daily, notes a lot of benefit for her chronic pain with this medication. Is also on gabapentin 1200mg TID with benefit.      4. Chronic opiate use: Has been on percocet for many years now. States that she has functional benefit in ADLs and mobility and takes one 5/325mg tablet three times daily. This has been reduced from earlier prescriptions of 5-325mg six times daily as needed. She has also stopped carisoprodol at my recommendation.     Mental Health - the patient's mental health concerns, specifically stress, anxiety and depression, affect her experience of pain and contribute to her clinically significant distress.           Plan:  The following recommendations were given to the patient. Diagnosis, treatment options, risks, benefits, and alternatives were discussed, and all questions were answered. The patient expressed understanding of the plan for management.      I am recommending a multidisciplinary treatment plan to help this patient better manage her pain.  This includes:      1. Physical Therapy: Chronic pain PT referral placed. Still hasn't been able to make this appointment due to uncertain housing situation and cost issues.  2. Clinical Health Pain Psychologist: Would certainly benefit from biofeed back/coping strategies. Recently established care with a psychiatrist, defer this  for now.  3. Self Care Recommendations: Gentle progressive exercise that does not increase pain - gradually increase daily walking program.  Take mini breaks - 5 minutes of mindfullness a couple times a day.   4. Diagnostic Studies: none at this time  5. Medication Management:   1. Oxycodone 5/325 every 6 hours prn - max 3/day. Prescriptions were provided for the next two months.  2. Methocarbamol 500mg TID prn  3. Continue gabapentin 1200mg tid  4. Continue duloxetine 60mg daily.  5. Topamax 75mg TID  6. Sumatriptan 50mg prn  6. Further procedures recommended:   1. Bilateral greater trochanter bursa injections performed in clinic with 4 months of relief, can be repeated every 4 months as needed.  2. Caudal epidural with significant relief, can be repeated every 3 months as needed. Repeat order placed at visit today.  7. Referrals: none  8. Follow up: 2 months in clinic        I spent 30 minutes of time face to face with the patient.  Greater than 50% of this time was spent in patient counseling and/or coordination of care regarding principles of multidisciplinary care, medication management, and treatment options as discussed above.           Maximiliano Sanchez DO  [facility]  5/2/19

## 2019-05-09 ENCOUNTER — OFFICE VISIT (OUTPATIENT)
Dept: INTERNAL MEDICINE | Facility: CLINIC | Age: 64
End: 2019-05-09
Payer: MEDICARE

## 2019-05-09 ENCOUNTER — RADIOLOGY INJECTION OFFICE VISIT (OUTPATIENT)
Dept: PALLIATIVE MEDICINE | Facility: CLINIC | Age: 64
End: 2019-05-09
Payer: MEDICARE

## 2019-05-09 ENCOUNTER — ANCILLARY PROCEDURE (OUTPATIENT)
Dept: GENERAL RADIOLOGY | Facility: CLINIC | Age: 64
End: 2019-05-09
Attending: PHYSICAL MEDICINE & REHABILITATION
Payer: MEDICARE

## 2019-05-09 VITALS
RESPIRATION RATE: 24 BRPM | SYSTOLIC BLOOD PRESSURE: 110 MMHG | HEART RATE: 125 BPM | OXYGEN SATURATION: 99 % | BODY MASS INDEX: 31.19 KG/M2 | DIASTOLIC BLOOD PRESSURE: 60 MMHG | TEMPERATURE: 98.4 F | WEIGHT: 169.5 LBS | HEIGHT: 62 IN

## 2019-05-09 VITALS — SYSTOLIC BLOOD PRESSURE: 144 MMHG | DIASTOLIC BLOOD PRESSURE: 90 MMHG | HEART RATE: 112 BPM | OXYGEN SATURATION: 100 %

## 2019-05-09 DIAGNOSIS — M54.16 LUMBAR RADICULOPATHY: ICD-10-CM

## 2019-05-09 DIAGNOSIS — M47.26 OSTEOARTHRITIS OF SPINE WITH RADICULOPATHY, LUMBAR REGION: Primary | ICD-10-CM

## 2019-05-09 DIAGNOSIS — M79.7 FIBROMYALGIA: ICD-10-CM

## 2019-05-09 DIAGNOSIS — Z11.1 SCREENING FOR TUBERCULOSIS: Primary | ICD-10-CM

## 2019-05-09 DIAGNOSIS — K21.9 GASTROESOPHAGEAL REFLUX DISEASE WITHOUT ESOPHAGITIS: ICD-10-CM

## 2019-05-09 DIAGNOSIS — M79.18 MYOFASCIAL PAIN: ICD-10-CM

## 2019-05-09 PROCEDURE — 99214 OFFICE O/P EST MOD 30 MIN: CPT | Performed by: NURSE PRACTITIONER

## 2019-05-09 PROCEDURE — 62323 NJX INTERLAMINAR LMBR/SAC: CPT | Performed by: PHYSICAL MEDICINE & REHABILITATION

## 2019-05-09 RX ORDER — QUETIAPINE FUMARATE 25 MG/1
25 TABLET, FILM COATED ORAL 3 TIMES DAILY
COMMUNITY
End: 2019-12-17

## 2019-05-09 RX ORDER — FLUTICASONE PROPIONATE 50 MCG
2 SPRAY, SUSPENSION (ML) NASAL DAILY
COMMUNITY
Start: 2012-08-02 | End: 2019-12-17

## 2019-05-09 RX ORDER — TOPIRAMATE 50 MG/1
75 TABLET, FILM COATED ORAL 3 TIMES DAILY
COMMUNITY
Start: 2019-02-01 | End: 2019-12-17

## 2019-05-09 ASSESSMENT — PATIENT HEALTH QUESTIONNAIRE - PHQ9: SUM OF ALL RESPONSES TO PHQ QUESTIONS 1-9: 24

## 2019-05-09 ASSESSMENT — MIFFLIN-ST. JEOR: SCORE: 1287.1

## 2019-05-09 ASSESSMENT — PAIN SCALES - GENERAL: PAINLEVEL: EXTREME PAIN (8)

## 2019-05-09 NOTE — PROGRESS NOTES
[facility] - Procedure Note    Date of Service: 5/9/2019    Procedure performed: caudal epidural steroid injection with fluoroscopic guidance  Diagnosis: Lumbar spondylosis; Lumbar radiculitis/radiculopathy  : Maximiliano Sanchez DO   Anesthesia: none    Indications: Lilliana Cardenas is a 63 year old female who is seen at the request of myself for a caudal epidural steroid injection. The patient describes pain in her low back that radiates into her bilateral lower extremities anteriorly and posteriorly to the feet. The patient has been exhibiting symptoms consistent with lumbar intraspinal inflammation and radiculopathy. Symptoms have been persistent, disabling, and intermittently severe. The patient reports minimal improvement with conservative treatment, including oral medications and exercises.    She last had a caudal epidural on 12/19/18 with significant pain relief until the past 2 weeks.    MRI Lumbar spine 11/9/2016  EXAM: MR LUMBAR SPINE WITHOUT CONTRAST     CLINICAL INFORMATION: Low back pain syndrome with thoracolumbar neuritis. History of falling injuries.     COMPARISON: CT lumbar 3/18/2014.     TECHNICAL INFORMATION: Sagittal and axial T1 and fast spin echo T2, sagittal STIR and coronal T2 images were obtained through the lumbar spine. The patient received 5 mL of oral liquid Valium. The patient's O2 saturation and heart rate were monitored throughout the procedure by pulse oximeter, and values remained within normal ranges.     INTERPRETATION:     Osseous Structures:     Fat suppressed images are negative for acute or subacute fractures. Active endplate discogenic marrow changes at L2-3.     Paraspinous Soft Tissues:     No mass lesions. Left renal cyst.     Conus, Cord and Cauda Equina:     Normal position conus and no evidence of intradural mass. High signal cord alteration is noted at T10-11 and T11-12. Chronic adhesive arachnoiditis changes at L4-5 and  L5-S1.     L4-5 and L5-S1: Solid interbody and dorsal fusion without recurrent stenosis. Chronic arachnoidal adhesions.     L3-4: Mild disc desiccation, fused facet joints and no spinal stenosis or neural impingement.     L2-3: Gaseous disc degeneration with extensive endplate discogenic marrow changes. No fluid in disc space to suggest infection although chronic infection should be excluded on clinical grounds. Hypertrophic facet degeneration with mild active left facet joint arthropathy. Mild to moderate central stenosis. Mild right and moderate left foraminal stenosis.     L1-2: Severe disc space narrowing with moderate fluid in the disc space likely degenerative in nature with some underlying marrow edema especially into L2 vertebral body. Infection is felt to be less likely but should be excluded on clinical grounds. Mild chronic superior compression deformity of T12.     Moderate to severe central stenosis is present with ventral cord/conus contact by osteophyte and bulging disc. Foraminal stenosis is severe on the right and mild to moderate on the left.     T12-L1: Moderate disc degeneration, bulge with hypertrophic facet arthropathy and moderate central spinal stenosis. Foramina appear patent.     T11-12: Hypertrophic facet degeneration with previous decompression, residual mild central stenosis but no cord compression. High signal cord alteration is consistent with myelomalacia and atrophy.     T10-11: Moderate disc degeneration with mild central stenosis and bulge abuts the cord. Mild high signal cord alteration suspected at extreme superior margin of sagittal views.        CONCLUSION: Multilevel lower thoracic and lumbar disc degeneration with significant findings as follows:     1. Moderate to severe degenerative central stenosis at L1-2 with impingement on distal cord/conus. Severe right and mild to moderate left foraminal stenosis.     2. Moderate T12-L1 central spinal stenosis.     3. High signal cord  alteration at T11-12 status post decompression with mild residual central stenosis. Cord compression and central stenosis at T10-11 with faint high signal cord change.     4. Extensive endplate marrow edema and degeneration at L2-3 most likely degenerative in nature, with mild to moderate central stenosis and severe left foraminal stenosis.     5. Endplate discogenic marrow changes at L2-3 and to a lesser degree L1-2 are likely degenerative in nature although chronic infection can sometimes give this appearance and clinical/laboratory correlation is suggested as clinically indicated.     6. Solid AP fusion from L4 to the sacrum with chronic adhesive arachnoiditis.     NOTE: Comparison with CT lumbar dated 3/18/2014 shows no change in L5 and L4 fusion. Significant interval progression of L2-3 disc degeneration and no change in severe gaseous disc degeneration at L1-2. Superior L1 compression fracture has healed.     WJM:tb     IMPRESSION:  1. Study at least moderately degraded by motion artifact. Foramina in particular are not optimally seen.  2. Degenerative changes seen involving the cervical spine most pronounced at C4-C5 through C6-C7 with multilevel mild to moderate central stenosis.  3. Study is incomplete. Postcontrast sagittal images were not obtained.  4. Nonspecific T2 hyperintensity within the central marlena which may be ischemic in etiology.           Allergies:      Allergies   Allergen Reactions     Lactose      Diarrhea      Pollen Extract Unknown     Sneezing, watery eyes, itchy nose     Prednisone      Pain          Vitals:  There were no vitals taken for this visit.    Review of Systems: The patient denies recent fever, chills, illness, use of antibiotics or anticoagulants. All other 10-point review of systems negative.       Procedure: The procedure and risks were explained, and informed written consent was obtained from the patient. Risks include but are not limited to: infection, bleeding,  increased pain, and damage to soft tissue, nerve, muscle, and vasculature structures. After getting informed consent, patient was brought into the procedure suite and was placed in a prone position on the procedure table. A Pause for the Cause was performed. Patient was prepped and draped in sterile fashion.     The sacral hiatus and cornua were palpated.  Under lateral fluoroscopic guidance sacral hiatus was identified.  A total of 3mL of Lidocaine 1%  used to anesthetize the skin and the needle track at a skin entry site.  A 22 gauge 5 inch spinal needle was advanced through the sacral hiatus using intermittent fluoroscopy. The needle angle was decreased to allow entrance into the sacral canal and epidural space.  This was verified in both the AP and lateral view for correct alignment.       The position was then inspected from anteroposterior and lateral views, and the needle adjusted appropriately. After negative aspiration for heme and CSF 1mL of omnipaque-300 was injected and intravascular uptake was noted. The needle was retracted and repositioned slightly to the right. After negative aspiration for heme and CSF, a total of 2 mL of Omnipaque-300 was injected using static and continuous fluoroscopy confirming appropriate position, into the epidural space, with no intravascular or intrathecal uptake. 7 mL of Omnipaque-300 was wasted.    1 mL of 1% lidocaine, 3 mL of preservative free saline, with 40 mg of triamcinolone was injected.  The needle was removed. Hemostasis was achieved, the area was cleaned, and bandaids were placed when appropriate. Images were saved to PACS.    The patient tolerated the procedure well, and was taken to the recovery room, and there was no evidence of procedural complications. No new sensory or motor deficits were noted following the procedure. The patient was stable and able to ambulate on discharge home. Post-procedure instructions were provided.     Pre-procedure pain score: 8/10  in the back, 8/10 in the leg  Post-procedure pain score: 8/10 in the back, 8/10 in the leg    Assessment/Plan: Lilliana Cardenas is a 63 year old female s/p caudal epidural steroid injection today for lumbar spondylosis, radiculitis/radiculopathy.     1. Following today's procedure, the patient was advised to contact the [facility] for any of the following:   Fever, chills, or night sweats   New onset of pain, numbness, or weakness   Any questions/concerns regarding the procedure  If unable to contact the Pain Center, the patient was instructed to go to a local Emergency Room for any complications.   2. The patient will receive a follow-up call in 1 week.  3. The patient should follow-up with the referring provider in 2 weeks for post-procedure evaluation.    Maximiliano Sanchez,   [facility]

## 2019-05-09 NOTE — PROGRESS NOTES
SUBJECTIVE:   Lilliana Cardenas is a 63 year old female who presents to clinic today for the following   health issues:      Patient is here needing labs and paperwork to get into a housing facility due to being homeless.     Amount of exercise or physical activity: None    Problems taking medications regularly: yes, if not in a shelter, if in a crisis center they are managed.    Medication side effects: none    Diet: regular (no restrictions)            Additional history:     Reviewed  and updated as needed this visit by clinical staff  Allergies  Meds         Reviewed and updated as needed this visit by Provider         Patient Active Problem List   Diagnosis     Chronic pain syndrome     Chronic low back pain     Opiate dependence (H)     Overdose     Moderate major depression (H)     Depression     Spondylosis of lumbar region without myelopathy or radiculopathy     Myofascial pain     Fibromyalgia     Osteoarthritis of spine with radiculopathy, lumbar region     Past Surgical History:   Procedure Laterality Date     GYN SURGERY         Social History     Tobacco Use     Smoking status: Light Tobacco Smoker     Packs/day: 1.00     Years: 38.00     Pack years: 38.00     Smokeless tobacco: Never Used     Tobacco comment: pt states she has smoked on & off for since she was 18   Substance Use Topics     Alcohol use: No     No family history on file.      Current Outpatient Medications   Medication Sig Dispense Refill     albuterol (PROAIR HFA/PROVENTIL HFA/VENTOLIN HFA) 108 (90 Base) MCG/ACT inhaler Inhale 2 puffs into the lungs 4 times daily as needed for other (dyspnea) 1 Inhaler 1     amLODIPine (NORVASC) 10 MG tablet Take 1 tablet (10 mg) by mouth daily 30 tablet 1     dicyclomine (BENTYL) 20 MG tablet Take 1 tablet (20 mg) by mouth every 6 hours 90 tablet 3     DULoxetine (CYMBALTA) 60 MG capsule Take 1 capsule (60 mg) by mouth daily 90 capsule 3     fluticasone (FLONASE) 50 MCG/ACT nasal spray Spray 2  sprays into both nostrils daily       fluticasone-salmeterol (ADVAIR) 250-50 MCG/DOSE inhaler Inhale 1 puff into the lungs every 12 hours 1 Inhaler 3     gabapentin (NEURONTIN) 300 MG capsule Take 1 capsule (300 mg) by mouth At Bedtime 90 capsule 3     gabapentin (NEURONTIN) 600 MG tablet Take 2 tablets (1,200 mg) by mouth 3 times daily 180 tablet 3     hydrOXYzine (ATARAX) 25 MG tablet TAKE 1 TABLET BY MOUTH FOUR TIMES A DAY AS NEEDED FOR ITCHING 120 tablet 1     ketoprofen 10% in PLO 10% topical gel Place 2-4 g onto the skin every 4 hours as needed for moderate pain 200 g 1     losartan (COZAAR) 25 MG tablet Take 1 tablet (25 mg) by mouth daily 90 tablet 3     methocarbamol (ROBAXIN) 500 MG tablet Take 1 tablet (500 mg) by mouth 3 times daily as needed for muscle spasms 90 tablet 1     omeprazole (PRILOSEC) 20 MG DR capsule Take 1 capsule (20 mg) by mouth daily 90 capsule 3     order for DME Equipment being ordered: BP monitor 1 each 0     order for DME Equipment being ordered: four wheeled walker with chair 1 Units 0     oxybutynin ER (DITROPAN-XL) 5 MG 24 hr tablet Take 1 tablet (5 mg) by mouth daily 90 tablet 3     [START ON 6/2/2019] oxyCODONE-acetaminophen (PERCOCET) 5-325 MG tablet 1 tab every 6 hours PRN, max 3 tab/day. 90 tablet 0     oxyCODONE-acetaminophen (PERCOCET) 5-325 MG tablet Take 1 tablet by mouth every 6 hours as needed for severe pain 90 tablet 0     pramipexole (MIRAPEX) 0.125 MG tablet Take 1 tablet (0.125 mg) by mouth At Bedtime 90 tablet 0     QUEtiapine (SEROQUEL) 25 MG tablet Take 25 mg by mouth 3 times daily       rOPINIRole (REQUIP) 1 MG tablet Take 1 tablet (1 mg) by mouth 3 times daily 90 tablet 3     SUMAtriptan (IMITREX) 50 MG tablet TAKE 1 TAB AT ONSET OF MIGRAINE. MAY REPEAT AFTER 2 HRS. MAX 2 TABS/DAY 21 tablet 0     topiramate (TOPAMAX) 50 MG tablet Take 75 mg by mouth 3 times daily       traZODone (DESYREL) 50 MG tablet Take 1 tablet (50 mg) by mouth nightly as needed 30 tablet  "2     Allergies   Allergen Reactions     Lactose      Diarrhea      Pollen Extract Unknown     Sneezing, watery eyes, itchy nose     Prednisone      Pain       BP Readings from Last 3 Encounters:   05/09/19 110/60   05/09/19 144/90   05/02/19 136/79    Wt Readings from Last 3 Encounters:   05/09/19 76.9 kg (169 lb 8 oz)   05/02/19 79.4 kg (175 lb)   03/20/19 81.3 kg (179 lb 3.2 oz)                    ROS:  Constitutional, HEENT, cardiovascular, pulmonary, gi and gu systems are negative, except as otherwise noted.    OBJECTIVE:     /60 (BP Location: Right arm, Patient Position: Sitting, Cuff Size: Adult Large)   Pulse 125   Temp 98.4  F (36.9  C) (Oral)   Resp 24   Ht 1.575 m (5' 2\")   Wt 76.9 kg (169 lb 8 oz)   SpO2 99%   BMI 31.00 kg/m    Body mass index is 31 kg/m .  GENERAL: healthy, alert and no distress  HENT: dentation absent   NECK: no adenopathy, no asymmetry, masses, or scars and thyroid normal to palpation  RESP: lungs clear to auscultation - no rales, rhonchi or wheezes  ABDOMEN: soft, nontender, no hepatosplenomegaly, no masses and bowel sounds normal  PSYCH: mentation appears normal, affect normal/bright        ASSESSMENT/PLAN:               ICD-10-CM    1. Screening for tuberculosis Z11.1 TB INTRADERMAL TEST   2. Gastroesophageal reflux disease without esophagitis K21.9 omeprazole (PRILOSEC) 20 MG DR capsule   3. Fibromyalgia M79.7    4. Myofascial pain M79.18        RTC next week for mantoux  Forms will be available      Bibi Chau NP  [facility]      "

## 2019-05-09 NOTE — NURSING NOTE
Discharge Information    IV Discontiued Time:  NA    Amount of Fluid Infused:  NA    Discharge Criteria = When patient returns to baseline or as per MD order    Consciousness:  Pt is fully awake    Circulation:  BP +/- 20% of pre-procedure level    Respiration:  Patient is able to breathe deeply    O2 Sat:  Patient is able to maintain O2 Sat >92% on room air    Activity:  Moves 4 extremities on command    Ambulation:  Patient is able to stand and walk or stand and pivot into wheelchair    Dressing:  Clean/dry or No Dressing    Notes:   Discharge instructions and AVS given to patient    Patient meets criteria for discharge?  YES    Admitted to PCU?  No    Responsible adult present to accompany patient home?  Yes    Signature/Title:    Marysol Sanchez RN Care Coordinator  [facility] [place] [facility]

## 2019-05-09 NOTE — PATIENT INSTRUCTIONS
[facility] Procedure Discharge Instructions    Today you saw:    Dr. Maximiliano Sanchez    Your procedure:   Caudal Epidural steroid injection     Medications used:  Lidocaine (anesthetic) Kenalog (steroid)  Omnipaque (contrast)         If you were holding your blood thinning medication, please restart taking it: N/A          Be cautious when walking as numbness and/or weakness in the legs may occur up to 6-8 hours after the procedure due to effect of the local anesthetic    Do not drive for 6 hours. The effect of the local anesthetic could slow your reflexes.     Avoid strenuous activity for the first 24 hours. You may resume your regular activities after that.     You may shower, however avoid swimming, tub baths or hot tubs for 24 hours following your procedure    You may have a mild to moderate increase in pain for several days following the injection.      You may use ice packs for 10-15 minutes, 3 to 4 times a day at the injection site for comfort    Do not use heat to painful areas for 6 to 8 hours. This will give the local anesthetic time to wear off and prevent you from accidentally burning your skin.    Unless you have been directed to avoid the use of anti-inflammatory medications (NSAIDS-ibuprofen, Aleve, Motrin), you may use these medications or Tylenol for pain control if needed.     With diabetes, check your blood sugar more frequently than usual as your blood sugar may be higher than normal for 10-14 days following a steroid injection. Contact your doctor who manages your diabetes if your blood sugar is higher than usual    Possible side effects of steroids that you may experience include flushing, elevated blood pressure, increased appetite, mild headaches and restlessness.  All of these symptoms will get better with time.    It may take up to 14 days for the steroid medication to start working although you may feel the effect as early as a few days after the procedure.     Follow up with  your referring provider in 2-3 weeks      If you experience any of the following, call the pain center line during work hours at [phone] or on-call physician after hours at [phone]:  -Fever over 100 degree F  -Swelling, bleeding, redness, drainage, warmth at the injection site  -Progressive weakness or numbness in your legs   -Loss of bowel or bladder function  -Unusual headache that is not relieved by Tylenol or your regular headache medication  -Unusual new onset of pain that is not improving

## 2019-05-13 ENCOUNTER — ALLIED HEALTH/NURSE VISIT (OUTPATIENT)
Dept: NURSING | Facility: CLINIC | Age: 64
End: 2019-05-13
Payer: MEDICARE

## 2019-05-13 DIAGNOSIS — Z11.1 SCREENING EXAMINATION FOR PULMONARY TUBERCULOSIS: Primary | ICD-10-CM

## 2019-05-13 PROCEDURE — 86580 TB INTRADERMAL TEST: CPT

## 2019-05-15 ENCOUNTER — ALLIED HEALTH/NURSE VISIT (OUTPATIENT)
Dept: NURSING | Facility: CLINIC | Age: 64
End: 2019-05-15
Payer: MEDICARE

## 2019-05-15 DIAGNOSIS — Z11.1 SCREENING EXAMINATION FOR PULMONARY TUBERCULOSIS: Primary | ICD-10-CM

## 2019-05-15 LAB
PPDINDURATION: 0 MM (ref 0–5)
PPDREDNESS: 0 MM

## 2019-05-15 NOTE — PROGRESS NOTES
Mantoux result:  Lab Results   Component Value Date    PPDREDNESS 0 05/15/2019    PPDINDURATIO 0 05/15/2019     Is induration greater than 5mm?  No    Patient presented to clinic to have mantoux read. Patient had mantoux placed over 48 hours ago, please see allied health visit from 5/15/19. Mantoux was negative. Writer filled out paperwork from patient's facility for mantoux testing per patient request. Per facility, patient will return in two weeks to have second step completed.

## 2019-05-16 ENCOUNTER — TELEPHONE (OUTPATIENT)
Dept: PALLIATIVE MEDICINE | Facility: CLINIC | Age: 64
End: 2019-05-16

## 2019-05-16 DIAGNOSIS — K21.9 GASTROESOPHAGEAL REFLUX DISEASE WITHOUT ESOPHAGITIS: ICD-10-CM

## 2019-05-16 NOTE — TELEPHONE ENCOUNTER
Last filled 11- for a qty of 30 capsules    Thank you,  Crystal Parker [facility]  [phone]

## 2019-05-16 NOTE — TELEPHONE ENCOUNTER
Patient had a caudal epidural steroid injection   on 5/9/19.  Called patient for an update.      Left message that we were calling for an update about how she was doing after the injection.  LM that if she has any problems or questions to call the clinic at [phone].

## 2019-05-17 NOTE — TELEPHONE ENCOUNTER
"This medication may not be due for a refill.  Requested Prescriptions   Pending Prescriptions Disp Refills     omeprazole (PRILOSEC) 20 MG DR capsule  Last Written Prescription Date:  5/9/2019  Last Fill Quantity: 90 capsule,  # refills: 3   Last office visit: 5/9/2019 with prescribing provider:  Bibi Chau NP    Future Office Visit:     90 capsule 3     Sig: Take 1 capsule (20 mg) by mouth daily       PPI Protocol Passed - 5/16/2019  4:45 PM        Passed - Not on Clopidogrel (unless Pantoprazole ordered)        Passed - No diagnosis of osteoporosis on record        Passed - Recent (12 mo) or future (30 days) visit within the authorizing provider's specialty     Patient had office visit in the last 12 months or has a visit in the next 30 days with authorizing provider or within the authorizing provider's specialty.  See \"Patient Info\" tab in inbasket, or \"Choose Columns\" in Meds & Orders section of the refill encounter.              Passed - Medication is active on med list        Passed - Patient is age 18 or older        Passed - No active pregnacy on record        Passed - No positive pregnancy test in past 12 months          "

## 2019-05-19 ENCOUNTER — TRANSFERRED RECORDS (OUTPATIENT)
Dept: HEALTH INFORMATION MANAGEMENT | Facility: CLINIC | Age: 64
End: 2019-05-19

## 2019-06-13 PROBLEM — M79.18 MYOFASCIAL PAIN: Status: RESOLVED | Noted: 2019-03-29 | Resolved: 2019-06-13

## 2019-06-14 ENCOUNTER — TELEPHONE (OUTPATIENT)
Dept: PALLIATIVE MEDICINE | Facility: CLINIC | Age: 64
End: 2019-06-14

## 2019-06-14 NOTE — TELEPHONE ENCOUNTER
Called facility and clarified meds. All questions answered.     Marysol Sanchez   BSN-RN Care Coordinator  [facility]

## 2019-06-14 NOTE — TELEPHONE ENCOUNTER
Received call from Ge at [facility] where the patient resides. They are needing clarification the patient's pain medications. Phone #[phone]      Aditi Mayen    [facility]

## 2019-06-18 DIAGNOSIS — M79.18 MYOFASCIAL PAIN: ICD-10-CM

## 2019-06-18 DIAGNOSIS — M96.1 FAILED BACK SURGICAL SYNDROME: ICD-10-CM

## 2019-06-18 NOTE — TELEPHONE ENCOUNTER
Prior Authorization Retail Medication Request    Medication/Dose: methocarbamol (ROBAXIN) 500 MG tablet   ICD code (if different than what is on RX):    Previously Tried and Failed:    Rationale:     Insurance Name:  MEDICARE   Insurance ID: [id number]    Pharmacy Information (if different than what is on RX)    [facility] - [place], [place] - [address] [place] [address]  [address]  [facility] [address]  Phone: [phone] Fax: [phone]      Aury Palmer [facility]  [facility]

## 2019-06-18 NOTE — TELEPHONE ENCOUNTER
Shaheen from [facility] calling to inform that the pt's Medication methocarbamol (ROBAXIN) 500 MG tablet needs a PA. The number to call is [phone]      Filomena WATSON    [facility] [place]

## 2019-06-19 ENCOUNTER — RECORDS - HEALTHEAST (OUTPATIENT)
Dept: LAB | Facility: CLINIC | Age: 64
End: 2019-06-19

## 2019-06-19 NOTE — TELEPHONE ENCOUNTER
PA Initiation    Medication: methocarbamol (ROBAXIN) 500 MG tablet -   Insurance Company: [employer] - Phone [phone] Fax [phone]  Pharmacy Filling the Rx: [facility] [address] [place] [address] - [address] [place] [address] [address] AT [facility]  Filling Pharmacy Phone: [phone]  Filling Pharmacy Fax:    Start Date: 6/19/2019

## 2019-06-19 NOTE — TELEPHONE ENCOUNTER
Prior Authorization Approval    Authorization Effective Date: 12/30/2018  Authorization Expiration Date: 12/31/2019  Medication: methocarbamol (ROBAXIN) 500 MG tablet - APPROVED  Approved Dose/Quantity:   Reference #:     Insurance Company: [employer] - Phone [phone] Fax [phone]  Expected CoPay:       CoPay Card Available:      Foundation Assistance Needed:    Which Pharmacy is filling the prescription (Not needed for infusion/clinic administered): [facility] [address] [place] [address] [address]  AT [facility]  Pharmacy Notified: Yes  Patient Notified: Comment:  **Instructed pharmacy to notify patient when script is ready to /ship.**

## 2019-06-20 LAB
ANION GAP SERPL CALCULATED.3IONS-SCNC: 8 MMOL/L (ref 5–18)
BUN SERPL-MCNC: 15 MG/DL (ref 8–22)
CALCIUM SERPL-MCNC: 8.9 MG/DL (ref 8.5–10.5)
CHLORIDE BLD-SCNC: 112 MMOL/L (ref 98–107)
CO2 SERPL-SCNC: 23 MMOL/L (ref 22–31)
CREAT SERPL-MCNC: 0.78 MG/DL (ref 0.6–1.1)
GFR SERPL CREATININE-BSD FRML MDRD: >60 ML/MIN/1.73M2
GLUCOSE BLD-MCNC: 94 MG/DL (ref 70–125)
POTASSIUM BLD-SCNC: 3.8 MMOL/L (ref 3.5–5)
SODIUM SERPL-SCNC: 143 MMOL/L (ref 136–145)

## 2019-06-21 NOTE — TELEPHONE ENCOUNTER
Shaheen RN at [facility] calling to following up on a pA that was needed. Please send script to [facility] [facility], fax number is [phone].      Any questions [phone]    Fax number for [place] is -  [phone]          Crys MUHAMMAD    [facility]

## 2019-06-21 NOTE — TELEPHONE ENCOUNTER
Called and spoke to Juan Carlos Jamison. New script needed to be faxed to [employer]     Fax: [phone]  Phone [phone]    Routing to provider to review script prepped per below. Set to local print for faxing      MA pool to fax to number above    Methocarbamol 500mg, #90, Refill:1      Per last OV 05/02/19:  1. Medication Management:   1. Oxycodone 5/325 every 6 hours prn - max 3/day. Prescriptions were provided for the next two months.  2. Methocarbamol 500mg TID prn

## 2019-06-24 ENCOUNTER — TELEPHONE (OUTPATIENT)
Dept: INTERNAL MEDICINE | Facility: CLINIC | Age: 64
End: 2019-06-24

## 2019-06-24 NOTE — LETTER
6/24/2019  Lilliana Cardenas  [address]   [place]  [facility] [address]    Dear Lilliana    We care about your health and have reviewed your health plan. We have reviewed your medical conditions, medication list, and lab results and am making recommendations based on this review, to better manage your health.    You are in particular need of attention regarding:  -Breast Cancer Screening  -Colon Cancer Screening  -Cervical Cancer Screening    We am recommending that you:  -schedule a MAMMOGRAM which is due.    1 in 8 women will develop invasive breast cancer during her lifetime and it is the most common non-skin cancer in American women.  EARLY detection, new treatments, and a better understanding of the disease have increased survival rates - the 5 year survival rate in the 1960s was 63% and today it is close to 90%.    If you are under/uninsured, we recommend you contact the [facility]. They offer mammograms at no charge or on a sliding fee charge. You can schedule with them at [phone]. Please have them send us the results.      Please disregard this reminder if you have had this exam elsewhere within the last year.  It would be helpful for us to have a copy of your mammogram report in your file so that we can best coordinate your care - please contact us with when your test was done so we can update your record.             -schedule a COLONOSCOPY to look for colon cancer (due every 10 years or 5 years in higher risk situations.)        Colon cancer is now the second leading cause of cancer-related deaths in the [place] for both men and women and there are over 130,000 new cases and 50,000 deaths per year from colon cancer.  Colonoscopies can prevent 90-95% of these deaths.  Problem lesions can be removed before they ever become cancer.  This test is not only looking for cancer, but also getting rid of precancerious lesions.    If you are under/uninsured, we  recommend you contact the [facility] program. [facility] is a free colorectal cancer screening program that provides colonoscopies for eligible under/uninsured [place] men and women. If you are interested in receiving a free colonoscopy, please call [employer] at [phone] (mention code [facility]) to see if you re eligible.      If you do not wish to do a colonoscopy or cannot afford to do one, at this time, there is another option. It is called a FIT test or Fecal Immunochemical Occult Blood Test (take home stool sample kit).  It does not replace the colonoscopy for colorectal cancer screening, but it can detect hidden bleeding in the lower colon.  It does need to be repeated every year and if a positive result is obtained, you would be referred for a colonoscopy.          If you have completed either one of these tests at another facility, please call with the details of when and where the tests were done and if they were normal or not. Or have the records sent to our clinic so that we can best coordinate your care.    -schedule a PAP SMEAR EXAM which is due.  Please disregard this reminder if you have had this exam elsewhere within the last year.  It would be helpful for us to have a copy of your recent pap smear report in our file so that we can best coordinate your care.    If you are under/uninsured, we recommend you contact the [facility]. They offer pap smears at no charge or on a sliding fee charge. You can schedule with them at [phone]. Please have them send us the results.      Health Maintenance Due   Topic Date Due     SPIROMETRY  1955     HEPATITIS C SCREENING  1955     ASTHMA ACTION PLAN  1955     ASTHMA CONTROL TEST  1955     COPD ACTION PLAN  1957     DEPRESSION ACTION PLAN  1955     HIV SCREENING  05/22/1972     ZOSTER IMMUNIZATION (1 of 2) 05/22/2007         Please call us at [phone] ([place]) or use [employer] to address the above  recommendations.     Thank you for trusting [facility] and we appreciate the opportunity to serve you.  We look forward to supporting your healthcare needs in the future.    Healthy Regards,    [facility]  June 24, 2019

## 2019-06-24 NOTE — TELEPHONE ENCOUNTER
Panel Management Review      Patient has the following on her problem list:     Depression / Dysthymia review    Measure:  Needs PHQ-9 score of 4 or less during index window.  Administer PHQ-9 and if score is 5 or more, send encounter to provider for next steps.    5 - 7 month window range: none    PHQ-9 SCORE 10/17/2018 12/10/2018 5/9/2019   PHQ-9 Total Score 8 21 24       If PHQ-9 recheck is 5 or more, route to provider for next steps.    Patient is due for:  None      Composite cancer screening  Chart review shows that this patient is due/due soon for the following Pap Smear, Mammogram and Colonoscopy  Summary:    Patient is due/failing the following:   COLONOSCOPY, MAMMOGRAM and PAP    Action needed:   Patient needs office visit for Pap. and Patient needs referral/order: Mammogram and Colonoscopy    Type of outreach:    Sent letter.    Questions for provider review:    None                                                                                                                                    CELI HUDSNO CMA       Chart routed to no one .

## 2019-06-25 ENCOUNTER — RECORDS - HEALTHEAST (OUTPATIENT)
Dept: LAB | Facility: CLINIC | Age: 64
End: 2019-06-25

## 2019-06-25 LAB
ALBUMIN UR-MCNC: NEGATIVE MG/DL
APPEARANCE UR: ABNORMAL
BACTERIA #/AREA URNS HPF: ABNORMAL HPF
BILIRUB UR QL STRIP: NEGATIVE
CAOX CRY #/AREA URNS HPF: PRESENT /[HPF]
COLOR UR AUTO: YELLOW
GLUCOSE UR STRIP-MCNC: NEGATIVE MG/DL
HGB UR QL STRIP: NEGATIVE
KETONES UR STRIP-MCNC: NEGATIVE MG/DL
LEUKOCYTE ESTERASE UR QL STRIP: ABNORMAL
NITRATE UR QL: NEGATIVE
PH UR STRIP: 6.5 [PH] (ref 4.5–8)
RBC #/AREA URNS AUTO: ABNORMAL HPF
SP GR UR STRIP: 1.02 (ref 1–1.03)
SQUAMOUS #/AREA URNS AUTO: ABNORMAL LPF
TRANS CELLS #/AREA URNS HPF: ABNORMAL LPF
UROBILINOGEN UR STRIP-ACNC: ABNORMAL
WBC #/AREA URNS AUTO: ABNORMAL HPF

## 2019-06-25 RX ORDER — METHOCARBAMOL 500 MG/1
500 TABLET, FILM COATED ORAL 3 TIMES DAILY PRN
Qty: 90 TABLET | Refills: 1 | Status: SHIPPED | OUTPATIENT
Start: 2019-06-25 | End: 2019-12-12

## 2019-06-26 ENCOUNTER — RECORDS - HEALTHEAST (OUTPATIENT)
Dept: LAB | Facility: CLINIC | Age: 64
End: 2019-06-26

## 2019-06-26 ENCOUNTER — TELEPHONE (OUTPATIENT)
Dept: PALLIATIVE MEDICINE | Facility: CLINIC | Age: 64
End: 2019-06-26

## 2019-06-26 DIAGNOSIS — M96.1 FAILED BACK SURGICAL SYNDROME: ICD-10-CM

## 2019-06-26 DIAGNOSIS — G89.4 CHRONIC PAIN SYNDROME: ICD-10-CM

## 2019-06-26 LAB
ALBUMIN UR-MCNC: ABNORMAL MG/DL
APPEARANCE UR: ABNORMAL
BACTERIA #/AREA URNS HPF: ABNORMAL HPF
BILIRUB UR QL STRIP: NEGATIVE
CAOX CRY #/AREA URNS HPF: PRESENT /[HPF]
COLOR UR AUTO: YELLOW
GLUCOSE UR STRIP-MCNC: NEGATIVE MG/DL
HGB UR QL STRIP: NEGATIVE
HYALINE CASTS #/AREA URNS LPF: ABNORMAL LPF
KETONES UR STRIP-MCNC: NEGATIVE MG/DL
LEUKOCYTE ESTERASE UR QL STRIP: ABNORMAL
MUCOUS THREADS #/AREA URNS LPF: ABNORMAL LPF
NITRATE UR QL: NEGATIVE
PH UR STRIP: 6.5 [PH] (ref 4.5–8)
RBC #/AREA URNS AUTO: ABNORMAL HPF
RENAL EPI CELLS #/AREA URNS HPF: ABNORMAL LPF
SP GR UR STRIP: 1.02 (ref 1–1.03)
SQUAMOUS #/AREA URNS AUTO: ABNORMAL LPF
UROBILINOGEN UR STRIP-ACNC: ABNORMAL
WBC #/AREA URNS AUTO: ABNORMAL HPF
WBC CLUMPS #/AREA URNS HPF: PRESENT /[HPF]

## 2019-06-26 NOTE — TELEPHONE ENCOUNTER
Shaheen SCHERER from [facility] calling to get a new script for oxyCODONE-acetaminophen (PERCOCET) 5-325 MG tablet sent to her pharmacy [facility] [phone] or Fax [phone].      Filomena WATSON    [facility] [place]

## 2019-06-26 NOTE — TELEPHONE ENCOUNTER
Routed to ALMA Sanchez   BSN-RN Care Coordinator  [facility]

## 2019-06-27 LAB — BACTERIA SPEC CULT: NORMAL

## 2019-06-28 NOTE — TELEPHONE ENCOUNTER
Received call from patient requesting refill(s) of     OxyCODONE-acetaminophen (PERCOCET) 5-325 MG tablet   Last picked up from pharmacy on 06/04/19    Pt last seen by prescribing provider on 05/02/19    No future appointments scheduled at this time     checked in the past 6 months? Yes If no, print current report and give to RN    Last urine drug screen date 11/19/18  Current opioid agreement on file (completed within the last year) Yes Date of opioid agreement: 11/19/18    Processing (pick one and delete the others):      E-prescribe to   [facility] - [address]  [address]  [address]  [place] [address]  Phone: [phone] Fax: [phone]        Will route to nursing pool for review and preparation of prescription(s).       Aury Palmer [facility]  [place]

## 2019-06-28 NOTE — TELEPHONE ENCOUNTER
Medication refill information reviewed.     Due date for Percocet is 7/4/19    Prescriptions prepped for review.     Will route to provider.     Marysol ROTHMANN-RN Care Coordinator  [facility]

## 2019-07-02 RX ORDER — OXYCODONE AND ACETAMINOPHEN 5; 325 MG/1; MG/1
TABLET ORAL
Qty: 90 TABLET | Refills: 0 | Status: SHIPPED | OUTPATIENT
Start: 2019-07-04 | End: 2019-07-02

## 2019-07-02 RX ORDER — OXYCODONE AND ACETAMINOPHEN 5; 325 MG/1; MG/1
TABLET ORAL
Qty: 90 TABLET | Refills: 0 | Status: SHIPPED | OUTPATIENT
Start: 2019-07-04 | End: 2019-12-17

## 2019-07-02 NOTE — TELEPHONE ENCOUNTER
Re-signing script with [facility].    Script Eprescribed to pharmacy    Will send this to MA team to notify patient.    Signed Prescriptions:                        Disp   Refills    oxyCODONE-acetaminophen (PERCOCET) 5-325 M*90 tab*0        Si tab every 6 hours PRN, max 3 tab/day. Ok to           dispense on/after  and start on   Authorizing Provider: ALYSIA OVERTON MD  [facility]

## 2019-07-02 NOTE — TELEPHONE ENCOUNTER
Spoke to Ge at [facility] where the patient resides.  Patient is out of medication, and cannot wait until hard script is mailed.

## 2019-07-02 NOTE — TELEPHONE ENCOUNTER
Rx signed. Please call patient to see if she wants to pick this up from [facility] or we can have it mailed to the pharmacy of her choice. Otherwise we can have one of the providers who have e-scribe sign this.    Thanks,      Maximiliano Sanchez, DO  [facility]

## 2019-08-01 DIAGNOSIS — M96.1 FAILED BACK SURGICAL SYNDROME: ICD-10-CM

## 2019-08-01 DIAGNOSIS — G89.4 CHRONIC PAIN SYNDROME: ICD-10-CM

## 2019-08-01 RX ORDER — OXYCODONE AND ACETAMINOPHEN 5; 325 MG/1; MG/1
1 TABLET ORAL EVERY 6 HOURS PRN
Qty: 90 TABLET | Refills: 0 | Status: SHIPPED | OUTPATIENT
Start: 2019-08-01 | End: 2019-12-17

## 2019-08-01 NOTE — TELEPHONE ENCOUNTER
Received call from patient requesting refill(s) of oxyCODONE-acetaminophen (PERCOCET) 5-325 MG tablet     Last sent to patient from pharmacy on 07/03/19    Pt last seen by prescribing provider on 05/02/19    No future appointment scheduled at this time     checked in the past 6 months? Yes If no, print current report and give to RN    Last urine drug screen date 11/19/18  Current opioid agreement on file (completed within the last year) Yes Date of opioid agreement: 11/19/18    Processing (pick one and delete the others):      E-prescribe to     [facility] - [address]  [address]  [address]  [place] [address]  Phone: [phone] Fax: [phone]    Will route to nursing pool for review and preparation of prescription(s).     ------    Spoke with pharmacy staff who stated that they only have records of the shipping date to customers and the medications are normally shipped and received on the same day due to them being a long term care facility pharmacy.     Aury Palmer [facility]  [place]

## 2019-08-01 NOTE — TELEPHONE ENCOUNTER
Received fax request from [facility] requesting refill(s) for Percocet 5-325mg Tablet    Last refilled on - No date listed on request    Pt last seen on 05/02/2019 (05/09/19 injection)  Next appt scheduled for - NONE    Will route to [facility] to facilitate refill.      Aditi Mayen    [facility]

## 2019-08-01 NOTE — TELEPHONE ENCOUNTER
Routing to provider to review medication prepped per below  **Upon signing please route to MA pool as follow up appt and reminder to call further in advance for refills.       Percocet 5-325, #90, Refill:No  Sig:OK to start 08/02/19  Last picked up 07/03/19  Due 08/02/19    Per last OV note 05/02/19:  1. Oxycodone 5/325 every 6 hours prn - max 3/day.     6. Follow up: 2 months in clinic      Leona LAWS, RN Care Coordinator  [facility]

## 2019-08-05 NOTE — TELEPHONE ENCOUNTER
"Phone number on file states \"unreachable.\" Unable to inform patient that Rx has been sent, schedule and appointment, and to call earlier for refills.  "

## 2019-09-18 ENCOUNTER — RECORDS - HEALTHEAST (OUTPATIENT)
Dept: LAB | Facility: CLINIC | Age: 64
End: 2019-09-18

## 2019-09-18 LAB
ALBUMIN UR-MCNC: NEGATIVE MG/DL
APPEARANCE UR: ABNORMAL
BACTERIA #/AREA URNS HPF: ABNORMAL HPF
BILIRUB UR QL STRIP: NEGATIVE
CAOX CRY #/AREA URNS HPF: PRESENT /[HPF]
COLOR UR AUTO: YELLOW
GLUCOSE UR STRIP-MCNC: NEGATIVE MG/DL
HGB UR QL STRIP: ABNORMAL
KETONES UR STRIP-MCNC: NEGATIVE MG/DL
LEUKOCYTE ESTERASE UR QL STRIP: ABNORMAL
NITRATE UR QL: NEGATIVE
PH UR STRIP: 7 [PH] (ref 4.5–8)
RBC #/AREA URNS AUTO: ABNORMAL HPF
SP GR UR STRIP: 1.01 (ref 1–1.03)
SQUAMOUS #/AREA URNS AUTO: ABNORMAL LPF
UROBILINOGEN UR STRIP-ACNC: ABNORMAL
WBC #/AREA URNS AUTO: ABNORMAL HPF

## 2019-09-19 LAB — BACTERIA SPEC CULT: NORMAL

## 2019-10-14 ENCOUNTER — RECORDS - HEALTHEAST (OUTPATIENT)
Dept: ADMINISTRATIVE | Facility: OTHER | Age: 64
End: 2019-10-14

## 2019-12-11 NOTE — PROGRESS NOTES
[facility]    Date of visit: 12/12/19    Chief complaint:   No chief complaint on file.      Interval history:  Lilliana Cardenas is a 64 year old female last seen by me on 5/2/19.    1. Physical Therapy: Chronic pain PT referral placed. Still hasn't been able to make this appointment due to uncertain housing situation and cost issues.  2. Clinical Health Pain Psychologist: Would certainly benefit from biofeed back/coping strategies. Recently established care with a psychiatrist, defer this for now.  3. Self Care Recommendations: Gentle progressive exercise that does not increase pain - gradually increase daily walking program.  Take mini breaks - 5 minutes of mindfullness a couple times a day.   4. Diagnostic Studies: none at this time  5. Medication Management:   1. Oxycodone 5/325 every 6 hours prn - max 3/day. Prescriptions were provided for the next two months.  2. Methocarbamol 500mg TID prn  3. Continue gabapentin 1200mg tid  4. Continue duloxetine 60mg daily.  5. Topamax 75mg TID  6. Sumatriptan 50mg prn  6. Further procedures recommended:   1. Bilateral greater trochanter bursa injections performed in clinic with 4 months of relief, can be repeated every 4 months as needed.  2. Caudal epidural with significant relief, can be repeated every 3 months as needed. Repeat order placed at visit today.  7. Referrals: none  8. Follow up: 2 months in clinic    Since her last visit, Lilliana Cardenas reports:  -She was in a nursing home for a while after being discharged after pneumonia treatment from [facility] in [place].  -She was at the nursing home for almost 3 months then left because she felt the conditions were very poor.  -She really wants to work with pain PT and pain psychology.  -She hasn't had percocet, methocarbamol, requip, mirapex for a while now and is very uncomfortable. States that gabapentin is the only pain medication she  is currently getting.  -Was taking hydroxyzine for itching related to percocet, she hadn't reported this side effect in the past.  -States that she is working on stabilizing her housing situation and really wants to focus on her health.  -Wants to work with the pain clinic and PT and pain psychology as well.      Pain scores:  Pain intensity on average is 7 on a scale of 0-10.     Current pain treatments:   -Hydroxyzine 25 mg QID prn  -Gabapentin 1200mg TID  -Trazodone 50mg hs prn (3-4 times/week)  -Imitrex 50mg prn  -Cymbalta 60mg qd  -Topiramate 75mg TID    Past pain treatments:  Medications:  -Carisoprodol 250mg QID prn  -Percocet 5/235 tablets q6h prn (takes 3 tablets/day)    2. Physical Therapy: Did PT in march              TENS unit: helps a little, doesn't have it with her.   3. Pain psychology: did this at [facility] was helpful  4. Surgery: SCSx2, lumbar surgeries x5 with decompression and fusion in 2009  5. Injections: epidurals were helpful,   -Caudal epidural on 12/18 and 5/19 was helpful  6. Alternative Therapies:               Chiropractic: didn't like               Acupuncture: helpful           Side Effects: Itching from percocet    Medications:  Current Outpatient Medications   Medication Sig Dispense Refill     albuterol (PROAIR HFA/PROVENTIL HFA/VENTOLIN HFA) 108 (90 Base) MCG/ACT inhaler Inhale 2 puffs into the lungs 4 times daily as needed for other (dyspnea) 1 Inhaler 1     amLODIPine (NORVASC) 10 MG tablet Take 1 tablet (10 mg) by mouth daily 30 tablet 1     dicyclomine (BENTYL) 20 MG tablet Take 1 tablet (20 mg) by mouth every 6 hours 90 tablet 3     DULoxetine (CYMBALTA) 60 MG capsule Take 1 capsule (60 mg) by mouth daily 90 capsule 3     fluticasone (FLONASE) 50 MCG/ACT nasal spray Spray 2 sprays into both nostrils daily       fluticasone-salmeterol (ADVAIR) 250-50 MCG/DOSE inhaler Inhale 1 puff into the lungs every 12 hours 1 Inhaler 3     gabapentin (NEURONTIN) 300 MG capsule Take 1 capsule  (300 mg) by mouth At Bedtime 90 capsule 3     gabapentin (NEURONTIN) 600 MG tablet Take 2 tablets (1,200 mg) by mouth 3 times daily 180 tablet 3     hydrOXYzine (ATARAX) 25 MG tablet TAKE 1 TABLET BY MOUTH FOUR TIMES A DAY AS NEEDED FOR ITCHING 120 tablet 1     ketoprofen 10% in PLO 10% topical gel Place 2-4 g onto the skin every 4 hours as needed for moderate pain 200 g 1     losartan (COZAAR) 25 MG tablet Take 1 tablet (25 mg) by mouth daily 90 tablet 3     methocarbamol (ROBAXIN) 500 MG tablet Take 1 tablet (500 mg) by mouth 3 times daily as needed for muscle spasms 90 tablet 1     omeprazole (PRILOSEC) 20 MG DR capsule Take 1 capsule (20 mg) by mouth daily 90 capsule 3     order for DME Equipment being ordered: BP monitor 1 each 0     order for DME Equipment being ordered: four wheeled walker with chair 1 Units 0     oxybutynin ER (DITROPAN-XL) 5 MG 24 hr tablet Take 1 tablet (5 mg) by mouth daily 90 tablet 3     oxyCODONE-acetaminophen (PERCOCET) 5-325 MG tablet Take 1 tablet by mouth every 6 hours as needed for severe pain OK to start 08/02/19 90 tablet 0     oxyCODONE-acetaminophen (PERCOCET) 5-325 MG tablet 1 tab every 6 hours PRN, max 3 tab/day. Ok to dispense on/after 7/2 and start on 7/4 90 tablet 0     pramipexole (MIRAPEX) 0.125 MG tablet Take 1 tablet (0.125 mg) by mouth At Bedtime 90 tablet 0     QUEtiapine (SEROQUEL) 25 MG tablet Take 25 mg by mouth 3 times daily       rOPINIRole (REQUIP) 1 MG tablet Take 1 tablet (1 mg) by mouth 3 times daily 90 tablet 3     SUMAtriptan (IMITREX) 50 MG tablet TAKE 1 TAB AT ONSET OF MIGRAINE. MAY REPEAT AFTER 2 HRS. MAX 2 TABS/DAY 21 tablet 0     topiramate (TOPAMAX) 50 MG tablet Take 75 mg by mouth 3 times daily       traZODone (DESYREL) 50 MG tablet Take 1 tablet (50 mg) by mouth nightly as needed 30 tablet 2       Medical History: any changes in medical history since they were last seen? Yes  Released from nursing home recently.    Review of Systems:  The 14  system ROS was reviewed from the intake questionnaire, and is positive for:  Headache, wheezing, hypertension, joint pain, arthritis, back pain, neck pain, weakness, paresthesias  Any bowel or bladder problems:  urgency, incontinence.  Mood: depression, anxiety, stress, mood swings    Physical Exam:  Blood pressure (!) 179/104, pulse 110, SpO2 99 %.  General: NAD, pleasant  Gait: Unsteady, uses four wheeled walker  MSK exam: Cervical and lumbar ROM is moderately reduced in all planes. Pain with paraspinal palpation in the cervical and lumbar spine as well as the shoulder and hi pgirdle musculature. Strength is 5/5 and symmetric in the upper and lower extremities.      Assessment:   Ms. Tijerina is a 64 year old with past medical history including DM, HTN, anxiety depression and chronic pain who presents for Follow up and treatment of the following chronic pain conditions:     1. Chronic low back and leg pain: History of several lumbar surgeries with ongoing pain. She has had spinal cord stimulators in the past with good relief but they were removed due to discomfort from the battery. Her last lumbar surgery was a decompression and fusion in 2009, no spinal cord stimulator after this. She has had persisting pain since the 2009 surgery which is consistent with lumbar spondylosis and radiculopathy. She also has arachnoiditis seen on prior MRI/CT scan which is also likely contributing to her symptoms. She has had caudal epidural x2 with significant pain relief.     2. Chronic bilateral hip pain:  The pain is likely due to gluteal muscle dysfunction/greater trochanter bursitis and IT band syndrome. Bilateral greater trochanter bursa injections performed in clinic on 10/23/18 with 4+ months of pain relief and performed again on 3/25/19 with significant benefit.     3. Fibromyalgia: Meets ACR criteria for fibromyalgia. Has tried many medications in the past and is currently on gabapentin 1200mg with benefit. Was started on  duloxetine which was increased to 60mg daily, notes a lot of benefit for her chronic pain with this medication.      4. Chronic opiate use: Has been on percocet for many years now. States that she has functional benefit in ADLs and mobility and takes one 5/325mg tablet three times daily. This has been reduced from earlier prescriptions of 5-325mg six times daily as needed. She has also stopped carisoprodol at my recommendation.    Today she notes itching with percocet and taking hydroxyzine to manage this. Discussed that we should stop hydroxyzine and percocet to minimize polypharmacy and start a different medication like norco which hasn't caused itching for her in the past. She was agreeable to this.     Mental Health - the patient's mental health concerns, specifically stress, anxiety and depression, affect her experience of pain and contribute to her clinically significant distress.     I discussed with the patient today that she hasn't been participating with the multidisciplinary team and that I understand why given her financial and living situation. Discussed that I would like to have Dr. Chau take over her medications. The patient was adamant that she wants to work with the pain clinic including with pain PT and pain phd. Discussed that I would place these referrals again but if she continues to defer making these appointments, that I would recommend she follow up with Dr. Chau going forward.        Plan:  The following recommendations were given to the patient. Diagnosis, treatment options, risks, benefits, and alternatives were discussed, and all questions were answered. The patient expressed understanding of the plan for management.      I am recommending a multidisciplinary treatment plan to help this patient better manage her pain.  This includes:      1. Physical Therapy: Chronic pain PT referral placed. Still hasn't been able to make this appointment due to uncertain housing situation and cost  issues.  2. Clinical Health Pain Psychologist: Referred to pain phd, would certainly benefit from biofeed back/coping strategies.   3. Self Care Recommendations: Gentle progressive exercise that does not increase pain - gradually increase daily walking program.  Take mini breaks - 5 minutes of mindfullness a couple times a day.   4. Diagnostic Studies: none at this time  5. Medication Management:   1. Stop oxycodone due to itching  2. Ordered norco 5-325 TID prn, #90 tablets for the month.  3. Methocarbamol 500mg TID prn ordered  4. Continue gabapentin 1200mg tid, new refill ordered  5. Continue duloxetine 60mg daily.  6. Recommend minimizing polypharmacy, she had also been on topamax in the past but we will add this if the above regimen is inadequate.  6. Further procedures recommended:   1. Bilateral greater trochanter bursa injections performed in clinic with 4 months of relief, can be repeated every 4 months as needed.  2. Caudal epidural with significant relief, can be repeated every 3 months as needed.   7. Referrals: follow up with pcp regarding hypertension  8. Follow up: 1 month in clinic        I spent 30 minutes of time face to face with the patient.  Greater than 50% of this time was spent in patient counseling and/or coordination of care regarding principles of multidisciplinary care, medication management, and treatment options as discussed above.           DO [facility]

## 2019-12-12 ENCOUNTER — OFFICE VISIT (OUTPATIENT)
Dept: PALLIATIVE MEDICINE | Facility: CLINIC | Age: 64
End: 2019-12-12
Payer: MEDICARE

## 2019-12-12 VITALS — HEART RATE: 110 BPM | DIASTOLIC BLOOD PRESSURE: 104 MMHG | OXYGEN SATURATION: 99 % | SYSTOLIC BLOOD PRESSURE: 179 MMHG

## 2019-12-12 DIAGNOSIS — M79.18 MYOFASCIAL PAIN: ICD-10-CM

## 2019-12-12 DIAGNOSIS — M79.7 FIBROMYALGIA: ICD-10-CM

## 2019-12-12 DIAGNOSIS — M96.1 FAILED BACK SURGICAL SYNDROME: ICD-10-CM

## 2019-12-12 PROCEDURE — 99214 OFFICE O/P EST MOD 30 MIN: CPT | Performed by: PHYSICAL MEDICINE & REHABILITATION

## 2019-12-12 RX ORDER — HYDROCODONE BITARTRATE AND ACETAMINOPHEN 5; 325 MG/1; MG/1
1 TABLET ORAL EVERY 6 HOURS PRN
Qty: 90 TABLET | Refills: 0 | Status: SHIPPED | OUTPATIENT
Start: 2019-12-12 | End: 2020-01-06

## 2019-12-12 RX ORDER — METHOCARBAMOL 500 MG/1
500 TABLET, FILM COATED ORAL 3 TIMES DAILY PRN
Qty: 90 TABLET | Refills: 3 | Status: SHIPPED | OUTPATIENT
Start: 2019-12-12

## 2019-12-12 RX ORDER — GABAPENTIN 600 MG/1
1200 TABLET ORAL 3 TIMES DAILY
Qty: 180 TABLET | Refills: 3 | Status: SHIPPED | OUTPATIENT
Start: 2019-12-12 | End: 2020-01-06

## 2019-12-12 ASSESSMENT — PAIN SCALES - GENERAL: PAINLEVEL: WORST PAIN (10)

## 2019-12-12 NOTE — PATIENT INSTRUCTIONS
----------------------------------------------------------------  [facility] Number:  [phone]     Call with any questions about your care and for scheduling assistance.     Calls are returned Monday through Friday between 8 AM and 4:30 PM. We usually get back to you within 2 business days depending on the issue/request.    If we are prescribing your medications:    For opioid medication refills, call the clinic or send a Qustreet message 7 days in advance.  Please include:    Name of requested medication    Name of the pharmacy.    For non-opioid medications, call your pharmacy directly to request a refill. Please allow 3-4 days to be processed.     Per [place] State Law:    All controlled substance prescriptions must be filled within 30 days of being written.      For those controlled substances allowing refills, pickup must occur within 30 days of last fill.      We believe regular attendance is key to your success in our program!      Any time you are unable to keep your appointment we ask that you call us at least 24 hours in advance to cancel.This will allow us to offer the appointment time to another patient.     Multiple missed appointments may lead to dismissal from the clinic.

## 2019-12-13 ENCOUNTER — TELEPHONE (OUTPATIENT)
Dept: PALLIATIVE MEDICINE | Facility: CLINIC | Age: 64
End: 2019-12-13

## 2019-12-17 ENCOUNTER — OFFICE VISIT (OUTPATIENT)
Dept: INTERNAL MEDICINE | Facility: CLINIC | Age: 64
End: 2019-12-17
Payer: MEDICARE

## 2019-12-17 VITALS
TEMPERATURE: 98.7 F | HEART RATE: 106 BPM | DIASTOLIC BLOOD PRESSURE: 90 MMHG | WEIGHT: 165.4 LBS | SYSTOLIC BLOOD PRESSURE: 144 MMHG | HEIGHT: 62 IN | BODY MASS INDEX: 30.44 KG/M2 | RESPIRATION RATE: 16 BRPM | OXYGEN SATURATION: 97 %

## 2019-12-17 DIAGNOSIS — F51.04 PSYCHOPHYSIOLOGICAL INSOMNIA: ICD-10-CM

## 2019-12-17 DIAGNOSIS — K21.9 GASTROESOPHAGEAL REFLUX DISEASE WITHOUT ESOPHAGITIS: ICD-10-CM

## 2019-12-17 DIAGNOSIS — M96.1 FAILED BACK SURGICAL SYNDROME: ICD-10-CM

## 2019-12-17 DIAGNOSIS — G89.4 CHRONIC PAIN SYNDROME: ICD-10-CM

## 2019-12-17 DIAGNOSIS — F41.8 DEPRESSION WITH ANXIETY: ICD-10-CM

## 2019-12-17 DIAGNOSIS — I10 ESSENTIAL HYPERTENSION: ICD-10-CM

## 2019-12-17 DIAGNOSIS — M79.18 MYOFASCIAL PAIN: ICD-10-CM

## 2019-12-17 DIAGNOSIS — I10 BENIGN ESSENTIAL HYPERTENSION: ICD-10-CM

## 2019-12-17 DIAGNOSIS — N32.81 OAB (OVERACTIVE BLADDER): ICD-10-CM

## 2019-12-17 DIAGNOSIS — K58.0 IRRITABLE BOWEL SYNDROME WITH DIARRHEA: ICD-10-CM

## 2019-12-17 LAB
ERYTHROCYTE [DISTWIDTH] IN BLOOD BY AUTOMATED COUNT: 14.2 % (ref 10–15)
HCT VFR BLD AUTO: 38.3 % (ref 35–47)
HGB BLD-MCNC: 12 G/DL (ref 11.7–15.7)
MCH RBC QN AUTO: 27.8 PG (ref 26.5–33)
MCHC RBC AUTO-ENTMCNC: 31.3 G/DL (ref 31.5–36.5)
MCV RBC AUTO: 89 FL (ref 78–100)
PLATELET # BLD AUTO: 233 10E9/L (ref 150–450)
RBC # BLD AUTO: 4.31 10E12/L (ref 3.8–5.2)
WBC # BLD AUTO: 7.6 10E9/L (ref 4–11)

## 2019-12-17 PROCEDURE — 80061 LIPID PANEL: CPT | Performed by: NURSE PRACTITIONER

## 2019-12-17 PROCEDURE — 80053 COMPREHEN METABOLIC PANEL: CPT | Performed by: NURSE PRACTITIONER

## 2019-12-17 PROCEDURE — 36415 COLL VENOUS BLD VENIPUNCTURE: CPT | Performed by: NURSE PRACTITIONER

## 2019-12-17 PROCEDURE — 99214 OFFICE O/P EST MOD 30 MIN: CPT | Performed by: NURSE PRACTITIONER

## 2019-12-17 PROCEDURE — 85027 COMPLETE CBC AUTOMATED: CPT | Performed by: NURSE PRACTITIONER

## 2019-12-17 RX ORDER — SUMATRIPTAN 50 MG/1
TABLET, FILM COATED ORAL
Qty: 21 TABLET | Refills: 0 | Status: SHIPPED | OUTPATIENT
Start: 2019-12-17

## 2019-12-17 RX ORDER — TOPIRAMATE 50 MG/1
75 TABLET, FILM COATED ORAL 3 TIMES DAILY
Qty: 90 TABLET | Refills: 3 | Status: SHIPPED | OUTPATIENT
Start: 2019-12-17 | End: 2020-03-25

## 2019-12-17 RX ORDER — DULOXETIN HYDROCHLORIDE 60 MG/1
60 CAPSULE, DELAYED RELEASE ORAL DAILY
Qty: 90 CAPSULE | Refills: 3 | Status: SHIPPED | OUTPATIENT
Start: 2019-12-17 | End: 2021-07-05

## 2019-12-17 RX ORDER — ROPINIROLE 1 MG/1
1 TABLET, FILM COATED ORAL 3 TIMES DAILY
Qty: 90 TABLET | Refills: 3 | Status: SHIPPED | OUTPATIENT
Start: 2019-12-17

## 2019-12-17 RX ORDER — LOSARTAN POTASSIUM 25 MG/1
25 TABLET ORAL DAILY
Qty: 90 TABLET | Refills: 3 | Status: SHIPPED | OUTPATIENT
Start: 2019-12-17 | End: 2021-02-11

## 2019-12-17 RX ORDER — METHOCARBAMOL 500 MG/1
500 TABLET, FILM COATED ORAL 3 TIMES DAILY PRN
Qty: 90 TABLET | Refills: 3 | Status: CANCELLED | OUTPATIENT
Start: 2019-12-17

## 2019-12-17 RX ORDER — QUETIAPINE FUMARATE 25 MG/1
25 TABLET, FILM COATED ORAL 3 TIMES DAILY
Qty: 90 TABLET | Refills: 3 | Status: SHIPPED | OUTPATIENT
Start: 2019-12-17

## 2019-12-17 RX ORDER — DICYCLOMINE HCL 20 MG
20 TABLET ORAL EVERY 6 HOURS
Qty: 90 TABLET | Refills: 3 | Status: SHIPPED | OUTPATIENT
Start: 2019-12-17 | End: 2019-12-18

## 2019-12-17 RX ORDER — TRAZODONE HYDROCHLORIDE 50 MG/1
50 TABLET, FILM COATED ORAL
Qty: 30 TABLET | Refills: 2 | Status: SHIPPED | OUTPATIENT
Start: 2019-12-17

## 2019-12-17 RX ORDER — FLUTICASONE PROPIONATE 50 MCG
2 SPRAY, SUSPENSION (ML) NASAL DAILY
Qty: 16 ML | Refills: 3 | Status: SHIPPED | OUTPATIENT
Start: 2019-12-17

## 2019-12-17 RX ORDER — PRAMIPEXOLE DIHYDROCHLORIDE 0.12 MG/1
0.12 TABLET ORAL AT BEDTIME
Qty: 90 TABLET | Refills: 0 | Status: SHIPPED | OUTPATIENT
Start: 2019-12-17

## 2019-12-17 RX ORDER — OXYBUTYNIN CHLORIDE 5 MG/1
5 TABLET, EXTENDED RELEASE ORAL DAILY
Qty: 90 TABLET | Refills: 3 | Status: SHIPPED | OUTPATIENT
Start: 2019-12-17

## 2019-12-17 RX ORDER — AMLODIPINE BESYLATE 10 MG/1
10 TABLET ORAL DAILY
Qty: 30 TABLET | Refills: 1 | Status: SHIPPED | OUTPATIENT
Start: 2019-12-17

## 2019-12-17 ASSESSMENT — PATIENT HEALTH QUESTIONNAIRE - PHQ9: SUM OF ALL RESPONSES TO PHQ QUESTIONS 1-9: 20

## 2019-12-17 ASSESSMENT — MIFFLIN-ST. JEOR: SCORE: 1263.5

## 2019-12-17 NOTE — LETTER
December 19, 2019      Lilliana Cardenas  [id number]   [place]  [facility] [address]        Dear ,    We are writing to inform you of your test results.    Normal     Resulted Orders   CBC with platelets   Result Value Ref Range    WBC 7.6 4.0 - 11.0 10e9/L    RBC Count 4.31 3.8 - 5.2 10e12/L    Hemoglobin 12.0 11.7 - 15.7 g/dL    Hematocrit 38.3 35.0 - 47.0 %    MCV 89 78 - 100 fl    MCH 27.8 26.5 - 33.0 pg    MCHC 31.3 (L) 31.5 - 36.5 g/dL    RDW 14.2 10.0 - 15.0 %    Platelet Count 233 150 - 450 10e9/L   Comprehensive metabolic panel   Result Value Ref Range    Sodium 141 133 - 144 mmol/L    Potassium 3.4 3.4 - 5.3 mmol/L    Chloride 107 94 - 109 mmol/L    Carbon Dioxide 23 20 - 32 mmol/L    Anion Gap 11 3 - 14 mmol/L    Glucose 101 (H) 70 - 99 mg/dL      Comment:      Fasting specimen    Urea Nitrogen 6 (L) 7 - 30 mg/dL    Creatinine 0.68 0.52 - 1.04 mg/dL    GFR Estimate >90 >60 mL/min/[1.73_m2]      Comment:      Non  GFR Calc  Starting 12/18/2018, serum creatinine based estimated GFR (eGFR) will be   calculated using the Chronic Kidney Disease Epidemiology Collaboration   (CKD-EPI) equation.      GFR Estimate If Black >90 >60 mL/min/[1.73_m2]      Comment:       GFR Calc  Starting 12/18/2018, serum creatinine based estimated GFR (eGFR) will be   calculated using the Chronic Kidney Disease Epidemiology Collaboration   (CKD-EPI) equation.      Calcium 8.9 8.5 - 10.1 mg/dL    Bilirubin Total 0.3 0.2 - 1.3 mg/dL    Albumin 3.6 3.4 - 5.0 g/dL    Protein Total 7.4 6.8 - 8.8 g/dL    Alkaline Phosphatase 126 40 - 150 U/L    ALT 18 0 - 50 U/L    AST 14 0 - 45 U/L   Lipid Profile   Result Value Ref Range    Cholesterol 179 <200 mg/dL    Triglycerides 105 <150 mg/dL      Comment:      Fasting specimen    HDL Cholesterol 59 >49 mg/dL    LDL Cholesterol Calculated 99 <100 mg/dL      Comment:      Desirable:       <100 mg/dl    Non HDL Cholesterol 120 <130  mg/dL       If you have any questions or concerns, please call the clinic at the number listed above.       Sincerely,        Bibi Chau NP

## 2019-12-17 NOTE — NURSING NOTE
"patient here for medication refill.  BP (!) 144/90 (BP Location: Right arm, Patient Position: Sitting, Cuff Size: Adult Regular)   Pulse 106   Temp 98.7  F (37.1  C) (Oral)   Resp 16   Ht 1.575 m (5' 2\")   Wt 75 kg (165 lb 6.4 oz)   LMP  (LMP Unknown)   SpO2 97%   BMI 30.25 kg/m      "

## 2019-12-17 NOTE — PROGRESS NOTES
Subjective     Lilliana Cardenas is a 64 year old female who presents to clinic today for the following health issues:    HPI   Hypertension Follow-up      Do you check your blood pressure regularly outside of the clinic? No     Are you following a low salt diet? Yes    Are your blood pressures ever more than 140 on the top number (systolic) OR more   than 90 on the bottom number (diastolic), for example 140/90? Yes    Depression Followup    How are you doing with your depression since your last visit? Improved     Are you having other symptoms that might be associated with depression? No    Have you had a significant life event?  No     Are you feeling anxious or having panic attacks?   No    Do you have any concerns with your use of alcohol or other drugs? No    Social History     Tobacco Use     Smoking status: Light Tobacco Smoker     Packs/day: 1.00     Years: 38.00     Pack years: 38.00     Smokeless tobacco: Never Used     Tobacco comment: pt states she has smoked on & off for since she was 18   Substance Use Topics     Alcohol use: No     Drug use: No     PHQ 10/17/2018 12/10/2018 5/9/2019   PHQ-9 Total Score 8 21 24   Q9: Thoughts of better off dead/self-harm past 2 weeks Not at all More than half the days Nearly every day   F/U: Thoughts of suicide or self-harm - - Yes   F/U: Self harm-plan - - Yes   F/U: Self-harm action - - Yes   F/U: Safety concerns - - No     SCOTT-7 SCORE 10/17/2018   Total Score 14     Last PHQ-9 5/9/2019   1.  Little interest or pleasure in doing things 3   2.  Feeling down, depressed, or hopeless 3   3.  Trouble falling or staying asleep, or sleeping too much 2   4.  Feeling tired or having little energy 2   5.  Poor appetite or overeating 3   6.  Feeling bad about yourself 3   7.  Trouble concentrating 3   8.  Moving slowly or restless 2   Q9: Thoughts of better off dead/self-harm past 2 weeks 3   PHQ-9 Total Score 24   Difficulty at work, home, or with people Extremely  "dIfficult   In the past two weeks have you had thoughts of suicide or self harm? Yes   Do you have concerns about your personal safety or the safety of others? No   In the past 2 weeks have you thought about a plan or had intention to harm yourself? Yes   In the past 2 weeks have you acted on these thoughts in any way? Yes         Suicide Assessment Five-step Evaluation and Treatment (SAFE-T)    Asthma Follow-Up    Was ACT completed today?  Yes  No flowsheet data found.    How many days per week do you miss taking your asthma controller medication?  2    Please describe any recent triggers for your asthma: dust mites    Have you had any Emergency Room Visits, Urgent Care Visits, or Hospital Admissions since your last office visit?  No      How many servings of fruits and vegetables do you eat daily?  2-3    On average, how many sweetened beverages do you drink each day (Examples: soda, juice, sweet tea, etc.  Do NOT count diet or artificially sweetened beverages)?   2  How many days per week do you miss taking your medication? 5  What makes it hard for you to take your medications?  needing refills                      Reviewed and updated as needed this visit by Provider         Review of Systems   ROS COMP: Constitutional, HEENT, cardiovascular, pulmonary, gi and gu systems are negative, except as otherwise noted.      Objective    BP (!) 144/90 (BP Location: Right arm, Patient Position: Sitting, Cuff Size: Adult Regular)   Pulse 106   Temp 98.7  F (37.1  C) (Oral)   Resp 16   Ht 1.575 m (5' 2\")   Wt 75 kg (165 lb 6.4 oz)   LMP  (LMP Unknown)   SpO2 97%   BMI 30.25 kg/m    Body mass index is 30.25 kg/m .  Physical Exam   GENERAL: alert, no distress, frail and elderly  RESP: lungs clear to auscultation - no rales, rhonchi or wheezes  CV: regular rate and rhythm, normal S1 S2, no S3 or S4, no murmur, click or rub, no peripheral edema and peripheral pulses strong            Assessment & Plan       ICD-10-CM  " "  1. Essential hypertension I10 amLODIPine (NORVASC) 10 MG tablet     CBC with platelets     Comprehensive metabolic panel     Lipid Profile   2. Irritable bowel syndrome with diarrhea K58.0 dicyclomine (BENTYL) 20 MG tablet   3. Depression with anxiety F41.8 DULoxetine (CYMBALTA) 60 MG capsule     QUEtiapine (SEROQUEL) 25 MG tablet   4. Benign essential hypertension I10 losartan (COZAAR) 25 MG tablet   5. Myofascial pain M79.18    6. Failed back surgical syndrome M96.1    7. Gastroesophageal reflux disease without esophagitis K21.9 omeprazole (PRILOSEC) 20 MG DR capsule   8. OAB (overactive bladder) N32.81 oxybutynin ER (DITROPAN-XL) 5 MG 24 hr tablet   9. Chronic pain syndrome G89.4 pramipexole (MIRAPEX) 0.125 MG tablet     rOPINIRole (REQUIP) 1 MG tablet     SUMAtriptan (IMITREX) 50 MG tablet     topiramate (TOPAMAX) 50 MG tablet   10. Psychophysiological insomnia F51.04 fluticasone (FLONASE) 50 MCG/ACT nasal spray     traZODone (DESYREL) 50 MG tablet        Tobacco Cessation:   reports that she has been smoking. She has a 38.00 pack-year smoking history. She has never used smokeless tobacco.        BMI:   Estimated body mass index is 30.25 kg/m  as calculated from the following:    Height as of this encounter: 1.575 m (5' 2\").    Weight as of this encounter: 75 kg (165 lb 6.4 oz).           F/u 3-6 months  Bibi Chau NP  [facility]        "

## 2019-12-18 ENCOUNTER — TELEPHONE (OUTPATIENT)
Dept: INTERNAL MEDICINE | Facility: CLINIC | Age: 64
End: 2019-12-18

## 2019-12-18 DIAGNOSIS — N32.81 OAB (OVERACTIVE BLADDER): Primary | ICD-10-CM

## 2019-12-18 DIAGNOSIS — K58.0 IRRITABLE BOWEL SYNDROME WITH DIARRHEA: ICD-10-CM

## 2019-12-18 LAB
ALBUMIN SERPL-MCNC: 3.6 G/DL (ref 3.4–5)
ALP SERPL-CCNC: 126 U/L (ref 40–150)
ALT SERPL W P-5'-P-CCNC: 18 U/L (ref 0–50)
ANION GAP SERPL CALCULATED.3IONS-SCNC: 11 MMOL/L (ref 3–14)
AST SERPL W P-5'-P-CCNC: 14 U/L (ref 0–45)
BILIRUB SERPL-MCNC: 0.3 MG/DL (ref 0.2–1.3)
BUN SERPL-MCNC: 6 MG/DL (ref 7–30)
CALCIUM SERPL-MCNC: 8.9 MG/DL (ref 8.5–10.1)
CHLORIDE SERPL-SCNC: 107 MMOL/L (ref 94–109)
CHOLEST SERPL-MCNC: 179 MG/DL
CO2 SERPL-SCNC: 23 MMOL/L (ref 20–32)
CREAT SERPL-MCNC: 0.68 MG/DL (ref 0.52–1.04)
GFR SERPL CREATININE-BSD FRML MDRD: >90 ML/MIN/{1.73_M2}
GLUCOSE SERPL-MCNC: 101 MG/DL (ref 70–99)
HDLC SERPL-MCNC: 59 MG/DL
LDLC SERPL CALC-MCNC: 99 MG/DL
NONHDLC SERPL-MCNC: 120 MG/DL
POTASSIUM SERPL-SCNC: 3.4 MMOL/L (ref 3.4–5.3)
PROT SERPL-MCNC: 7.4 G/DL (ref 6.8–8.8)
SODIUM SERPL-SCNC: 141 MMOL/L (ref 133–144)
TRIGL SERPL-MCNC: 105 MG/DL

## 2019-12-18 RX ORDER — DICYCLOMINE HCL 20 MG
20 TABLET ORAL 4 TIMES DAILY PRN
Qty: 90 TABLET | Refills: 3 | Status: SHIPPED | OUTPATIENT
Start: 2019-12-18 | End: 2020-09-25

## 2019-12-18 ASSESSMENT — ASTHMA QUESTIONNAIRES: ACT_TOTALSCORE: 12

## 2019-12-18 NOTE — TELEPHONE ENCOUNTER
Patient is calling to ask Bibi to send an order for Lupe pullups,size medium and they will provide 150 per month.Please fax the order to [facility] at [phone]. The contact name in this message is hPilomena the REGISTERED NURSE as the patient is currently in a [facility] facility.

## 2019-12-18 NOTE — TELEPHONE ENCOUNTER
[facility] calling. Patient said dicyclomine (BENTYL) 20 MG tablet should be PRN. Dosing instructions say every 6 hours. If PRN please send a new Rx so group home knows how Anu should receive medications. [facility] pharmacy's number is [phone]

## 2020-01-02 ENCOUNTER — TELEPHONE (OUTPATIENT)
Dept: INTERNAL MEDICINE | Facility: CLINIC | Age: 65
End: 2020-01-02

## 2020-01-02 DIAGNOSIS — G89.4 CHRONIC PAIN SYNDROME: Primary | ICD-10-CM

## 2020-01-02 NOTE — TELEPHONE ENCOUNTER
Will route to Bibi Chau NP when she returns.     Not sure of size.     Pt states she has always had them and needs a new pair.

## 2020-01-02 NOTE — TELEPHONE ENCOUNTER
Felisha calling with [employer] to clarify the gabapentin medication. Is she suppose to take 1200mg three times a day and now since she was in the hospital it says take 300mg at bed time. Felisha can be reached at [phone] and it is ok to leave a message.

## 2020-01-02 NOTE — TELEPHONE ENCOUNTER
Call Felisha and BETSY. Do not see any recent Hospitalizations.   ED visit on 12/30/19 does not mention any changes.    LM that the Pain clinic doctor advises to continue her current dose as of 12/12/19:  -Continue gabapentin 1200mg tid, new refill ordered.

## 2020-01-02 NOTE — TELEPHONE ENCOUNTER
Patient is calling to request an order for compression stockings to be faxed to [facility] at Fax [phone].

## 2020-01-02 NOTE — TELEPHONE ENCOUNTER
Pt calls again about the Pulls up. No DME order found.     Pended. Please sign then we can fax.

## 2020-01-03 ENCOUNTER — TELEPHONE (OUTPATIENT)
Dept: INTERNAL MEDICINE | Facility: CLINIC | Age: 65
End: 2020-01-03

## 2020-01-06 ENCOUNTER — OFFICE VISIT (OUTPATIENT)
Dept: BEHAVIORAL HEALTH | Facility: CLINIC | Age: 65
End: 2020-01-06
Payer: COMMERCIAL

## 2020-01-06 ENCOUNTER — OFFICE VISIT (OUTPATIENT)
Dept: INTERNAL MEDICINE | Facility: CLINIC | Age: 65
End: 2020-01-06
Payer: COMMERCIAL

## 2020-01-06 VITALS
DIASTOLIC BLOOD PRESSURE: 66 MMHG | HEIGHT: 62 IN | OXYGEN SATURATION: 98 % | BODY MASS INDEX: 32.96 KG/M2 | TEMPERATURE: 98.5 F | HEART RATE: 112 BPM | SYSTOLIC BLOOD PRESSURE: 124 MMHG | WEIGHT: 179.1 LBS | RESPIRATION RATE: 16 BRPM

## 2020-01-06 DIAGNOSIS — J44.1 CHRONIC OBSTRUCTIVE PULMONARY DISEASE WITH ACUTE EXACERBATION (H): ICD-10-CM

## 2020-01-06 DIAGNOSIS — M96.1 FAILED BACK SURGICAL SYNDROME: ICD-10-CM

## 2020-01-06 DIAGNOSIS — M79.7 FIBROMYALGIA: ICD-10-CM

## 2020-01-06 DIAGNOSIS — G89.4 CHRONIC PAIN SYNDROME: ICD-10-CM

## 2020-01-06 DIAGNOSIS — R05.9 COUGH: Primary | ICD-10-CM

## 2020-01-06 DIAGNOSIS — R69 DIAGNOSIS DEFERRED: Primary | ICD-10-CM

## 2020-01-06 PROBLEM — N39.3 FEMALE STRESS INCONTINENCE: Status: ACTIVE | Noted: 2020-01-06

## 2020-01-06 PROCEDURE — 99214 OFFICE O/P EST MOD 30 MIN: CPT | Performed by: NURSE PRACTITIONER

## 2020-01-06 RX ORDER — IBUPROFEN 600 MG/1
600 TABLET, FILM COATED ORAL EVERY 6 HOURS PRN
Qty: 30 TABLET | Refills: 3 | Status: SHIPPED | OUTPATIENT
Start: 2020-01-06

## 2020-01-06 RX ORDER — GABAPENTIN 600 MG/1
1200 TABLET ORAL 3 TIMES DAILY
Qty: 180 TABLET | Refills: 3 | Status: SHIPPED | OUTPATIENT
Start: 2020-01-06 | End: 2020-06-23

## 2020-01-06 RX ORDER — HYDROCODONE BITARTRATE AND ACETAMINOPHEN 5; 325 MG/1; MG/1
1 TABLET ORAL EVERY 6 HOURS PRN
Qty: 90 TABLET | Refills: 0 | Status: CANCELLED | OUTPATIENT
Start: 2020-01-06

## 2020-01-06 RX ORDER — GABAPENTIN 300 MG/1
300 CAPSULE ORAL AT BEDTIME
Qty: 90 CAPSULE | Refills: 3 | Status: SHIPPED | OUTPATIENT
Start: 2020-01-06

## 2020-01-06 RX ORDER — DIPHENHYDRAMINE HCL 25 MG
25 CAPSULE ORAL EVERY 6 HOURS PRN
Qty: 30 CAPSULE | Refills: 3 | Status: SHIPPED | OUTPATIENT
Start: 2020-01-06

## 2020-01-06 RX ORDER — GUAIFENESIN 200 MG/10ML
200 LIQUID ORAL EVERY 4 HOURS PRN
Qty: 280 ML | Refills: 0 | Status: SHIPPED | OUTPATIENT
Start: 2020-01-06

## 2020-01-06 RX ORDER — HYDROCODONE BITARTRATE AND ACETAMINOPHEN 5; 325 MG/1; MG/1
1 TABLET ORAL EVERY 6 HOURS PRN
Qty: 28 TABLET | Refills: 0 | Status: SHIPPED | OUTPATIENT
Start: 2020-01-06

## 2020-01-06 ASSESSMENT — MIFFLIN-ST. JEOR: SCORE: 1325.64

## 2020-01-06 NOTE — PROGRESS NOTES
Subjective     Lilliana Cardenas is a 64 year old female who presents to clinic today for the following health issues:    HPI   ED/UC Followup:    Facility:  [facility]  Date of visit: 12/30/19  Reason for visit: lung nodule,bronchitis,pneumonia  Current Status: stable, afebrile, needs inhaler refill  Requests refill norco as pain specialist is out of town, last RF 12/12/19 #28           Patient Active Problem List   Diagnosis     Chronic pain syndrome     Chronic low back pain     Opiate dependence (H)     Overdose     Moderate major depression (H)     Depression     Spondylosis of lumbar region without myelopathy or radiculopathy     Fibromyalgia     Osteoarthritis of spine with radiculopathy, lumbar region     Female stress incontinence     Past Surgical History:   Procedure Laterality Date     GYN SURGERY         Social History     Tobacco Use     Smoking status: Light Tobacco Smoker     Packs/day: 1.00     Years: 38.00     Pack years: 38.00     Smokeless tobacco: Never Used     Tobacco comment: pt states she has smoked on & off for since she was 18   Substance Use Topics     Alcohol use: No     History reviewed. No pertinent family history.      Current Outpatient Medications   Medication Sig Dispense Refill     albuterol (PROAIR HFA/PROVENTIL HFA/VENTOLIN HFA) 108 (90 Base) MCG/ACT inhaler Inhale 2 puffs into the lungs 4 times daily as needed for other (dyspnea) 1 Inhaler 1     amLODIPine (NORVASC) 10 MG tablet Take 1 tablet (10 mg) by mouth daily 30 tablet 1     dicyclomine (BENTYL) 20 MG tablet Take 1 tablet (20 mg) by mouth 4 times daily as needed 90 tablet 3     diphenhydrAMINE (BENADRYL) 25 MG capsule Take 1 capsule (25 mg) by mouth every 6 hours as needed for itching or allergies 30 capsule 3     DULoxetine (CYMBALTA) 60 MG capsule Take 1 capsule (60 mg) by mouth daily 90 capsule 3     fluticasone (FLONASE) 50 MCG/ACT nasal spray Spray 2 sprays into both nostrils daily 16 mL 3      fluticasone-salmeterol (ADVAIR) 250-50 MCG/DOSE inhaler Inhale 1 puff into the lungs every 12 hours 1 Inhaler 3     gabapentin (NEURONTIN) 300 MG capsule Take 1 capsule (300 mg) by mouth At Bedtime 90 capsule 3     gabapentin (NEURONTIN) 600 MG tablet Take 2 tablets (1,200 mg) by mouth 3 times daily 180 tablet 3     guaiFENesin (ROBITUSSIN) 100 MG/5ML liquid Take 10 mLs (200 mg) by mouth every 4 hours as needed for cough 280 mL 0     HYDROcodone-acetaminophen (NORCO) 5-325 MG tablet Take 1 tablet by mouth every 6 hours as needed for severe pain 28 tablet 0     ibuprofen (ADVIL/MOTRIN) 600 MG tablet Take 1 tablet (600 mg) by mouth every 6 hours as needed for moderate pain 30 tablet 3     losartan (COZAAR) 25 MG tablet Take 1 tablet (25 mg) by mouth daily 90 tablet 3     methocarbamol (ROBAXIN) 500 MG tablet Take 1 tablet (500 mg) by mouth 3 times daily as needed for muscle spasms 90 tablet 3     naloxone (NARCAN) 4 MG/0.1ML nasal spray Spray 1 spray (4 mg) into one nostril alternating nostrils once as needed for opioid reversal every 2-3 minutes until assistance arrives 0.2 mL 0     omeprazole (PRILOSEC) 20 MG DR capsule Take 1 capsule (20 mg) by mouth daily 90 capsule 3     order for DME Equipment being ordered: BP monitor 1 each 0     order for DME Equipment being ordered: four wheeled walker with chair 1 Units 0     oxybutynin ER (DITROPAN-XL) 5 MG 24 hr tablet Take 1 tablet (5 mg) by mouth daily 90 tablet 3     pramipexole (MIRAPEX) 0.125 MG tablet Take 1 tablet (0.125 mg) by mouth At Bedtime 90 tablet 0     QUEtiapine (SEROQUEL) 25 MG tablet Take 1 tablet (25 mg) by mouth 3 times daily 90 tablet 3     rOPINIRole (REQUIP) 1 MG tablet Take 1 tablet (1 mg) by mouth 3 times daily 90 tablet 3     SUMAtriptan (IMITREX) 50 MG tablet TAKE 1 TAB AT ONSET OF MIGRAINE. MAY REPEAT AFTER 2 HRS. MAX 2 TABS/DAY 21 tablet 0     topiramate (TOPAMAX) 50 MG tablet Take 1.5 tablets (75 mg) by mouth 3 times daily 90 tablet 3      "traZODone (DESYREL) 50 MG tablet Take 1 tablet (50 mg) by mouth nightly as needed 30 tablet 2     BP Readings from Last 3 Encounters:   01/06/20 124/66   12/17/19 (!) 144/90   12/12/19 (!) 179/104    Wt Readings from Last 3 Encounters:   01/06/20 81.2 kg (179 lb 1.6 oz)   12/17/19 75 kg (165 lb 6.4 oz)   05/09/19 76.9 kg (169 lb 8 oz)                      Reviewed and updated as needed this visit by Provider         Review of Systems   ROS COMP: Constitutional, HEENT, cardiovascular, pulmonary, gi and gu systems are negative, except as otherwise noted.      Objective    /66 (BP Location: Right arm, Patient Position: Sitting, Cuff Size: Adult Regular)   Pulse 112   Temp 98.5  F (36.9  C) (Oral)   Resp 16   Ht 1.575 m (5' 2\")   Wt 81.2 kg (179 lb 1.6 oz)   LMP  (LMP Unknown)   SpO2 98%   BMI 32.76 kg/m    Body mass index is 32.76 kg/m .  Physical Exam   GENERAL: alert, no distress, frail and elderly  RESP: lungs clear to auscultation - no rales, rhonchi or wheezes  CV: regular rate and rhythm, normal S1 S2, no S3 or S4, no murmur, click or rub, no peripheral edema and peripheral pulses strong            Assessment & Plan       ICD-10-CM    1. Cough R05 guaiFENesin (ROBITUSSIN) 100 MG/5ML liquid     ibuprofen (ADVIL/MOTRIN) 600 MG tablet     diphenhydrAMINE (BENADRYL) 25 MG capsule   2. Chronic obstructive pulmonary disease with acute exacerbation (H) J44.1 fluticasone-salmeterol (ADVAIR) 250-50 MCG/DOSE inhaler   3. Chronic pain syndrome G89.4 gabapentin (NEURONTIN) 300 MG capsule     naloxone (NARCAN) 4 MG/0.1ML nasal spray   4. Fibromyalgia M79.7 gabapentin (NEURONTIN) 600 MG tablet   5. Failed back surgical syndrome M96.1 HYDROcodone-acetaminophen (NORCO) 5-325 MG tablet        Tobacco Cessation:   reports that she has been smoking. She has a 38.00 pack-year smoking history. She has never used smokeless tobacco.        BMI:   Estimated body mass index is 32.76 kg/m  as calculated from the following:   " " Height as of this encounter: 1.575 m (5' 2\").    Weight as of this encounter: 81.2 kg (179 lb 1.6 oz).           F/u prn  The current medical regimen is effective;  continue present plan and medications.      Bibi Chau, DAV  [facility]        "

## 2020-01-06 NOTE — PROGRESS NOTES
Behavioral Health Home Services  No data recorded      Social Work Care Navigator Note      Patient: Lilliana Cardenas  Date: January 6, 2020  Preferred Name: Anu    Previous PHQ-9:   PHQ-9 SCORE 12/10/2018 5/9/2019 12/17/2019   PHQ-9 Total Score 21 24 20     Previous SCOTT-7:   SCOTT-7 SCORE 10/17/2018   Total Score 14     EVY LEVEL:  No flowsheet data found.    Preferred Contact:  No data recorded    Type of Contact Today: Face to Face in Clinic      Data: (subjective / Objective):    [facility] Introduction:  Hi my name is RADHA Galvan from your (name) primary care clinic.     I work closely with your primary care provider, Bibi Chau.     If it's ok I'd like to talk about some new services available to you, at no out of pocket cost to you.      Before we get started can you verify your insurance for me? Medicaid and Medicare Parts A & B    What social work or case management services do you receive? (If so, are you receiving ACT or TCM?).  Jt Nieto - Patient is not eligible for Behavioral Health Home (BHH) services at this time. Patient is receiving Targeted Case Management which is a duplication of services.    Getting to Know You - Whole Person Care:  This new service is called Behavioral Health Home services, which is designed to support you as a whole person beyond just your medical needs.      I'm here to be a central point of contact for your healthcare needs and to help with:    Housing    Transportation    Financial resources    Comprehensive Health needs (appointment help, medication costs, etc.)    Employment    Education    Health Insurance applications    And connecting with social supports or community resources    Out of the things I mentioned what would you find helpful?  Patient currently is living at [facility] which is an [facility] facility. She ideally would like to move into [facility] in [place] which is an assisted living facility and it has openings. Patient  reports she has been Homeless since last year and has been in and out of shelters. Her last lease was terminated about 2 years ago when she was evicted. Prior notes indicate it was due to patient not paying rent. Her furniture remains at her last apartment and patient reports that she is still being charged for this. [facility] encouraged her to speak to her TCM - Yvrose about this so that it can be arranged once she moves into a new residence.  Patient states she just was notified that she is eligible for the CADI Waiver as of late November but assigned a  or Case Management agency yet. She will be touring [facility] tomorrow.     Patient currently is receiving Social Security Disability - $1208/month. She states she stopped qualifying for SNAP due to her income but she will check with her financial worker for her eligibility on SNAP and GRH/Housing Support she may qualify for. [facility] offered to provide a food shelf list in the area, which patient denied, stating she is familiar with food shelves in the area already. [facility] provides her meals but she has not liked the breakfast that is offered.    As of last week, patient states she started [facility] services through [employer].    Credit card was used fraudulently. Her credit card company is [employer]. Wasn't canceled though patient states she has tried called to cancel card multiple times. Patient thinks she knew who had stolen her credit card, so this worker encouraged her to complete a police report and to also continue calling [employer] until her credit card is canceled.    Was asked about taking 90 pills of Norco but patient reports the prescription was for 28 pills. Staff at [facility] administer medications on a daily basis (4x/day) so she is confused as to how this would happen.  [facility] ([facility]): $922/month for rent. Patient states she doesn't qualify for GRH/Housing Support but will check in with her financial worker to double  check this.    Patient denied needing any further assistance. Since patient is not qualified for Behavioral Health Home ([facility]) services, [facility] informed her that clinic care coordination may be able to assist her if she needed assistance, though she was encouraged to utilize her existing services first. She thanked this worker.      Patient response to [facility] Service offering:   Not interested enrolling in [facility] services    Adriana Espino JASMYN  Behavioral Health Home ([facility])   [facility]  [phone]

## 2020-01-08 ENCOUNTER — TELEPHONE (OUTPATIENT)
Dept: INTERNAL MEDICINE | Facility: CLINIC | Age: 65
End: 2020-01-08

## 2020-01-08 NOTE — TELEPHONE ENCOUNTER
Prior Authorization Retail Medication Request    Medication/Dose: TOPIRAMATE 50 MG TAB  ICD code (if different than what is on RX):    Previously Tried and Failed:    Rationale:      Insurance Name:  MEDICARE  Insurance ID:  [id number]       Pharmacy Information (if different than what is on RX)  Name:  [facility]  Phone:  [phone]

## 2020-01-09 NOTE — TELEPHONE ENCOUNTER
Patient calling and she still doesn't have her compression socks. Please fax to [phone] or call them [phone]   ok to call patient and beau [phone]

## 2020-01-09 NOTE — TELEPHONE ENCOUNTER
This was faxed on 1/6/20. Will re-fax to [phone]. Along with the pull ups Rx.     Mailed copy per request.

## 2020-01-09 NOTE — TELEPHONE ENCOUNTER
Central Prior Authorization Team   Phone: [phone]      Prior Authorization Approval    Authorization Effective Date: 1/1/2020  Authorization Expiration Date: 1/30/2021  Medication: TOPIRAMATE - APPROVED  Approved Dose/Quantity: 90 FOR 20  Reference #: [id number]   Insurance Company: [employer] - Phone [phone] Fax [phone]  Expected CoPay:       CoPay Card Available:      Foundation Assistance Needed:    Which Pharmacy is filling the prescription (Not needed for infusion/clinic administered): [facility] [place] [id number] - [place], [place] - [address]  Pharmacy Notified: Yes  Patient Notified: Yes (**Instructed pharmacy to notify patient when script is ready to /ship.**)    PA initiated and approved via phone with Raissa from [employer].    Reference # [id number]    Pt received partial on 01/07/2020 - pharmacy will send out remainder.

## 2020-01-23 ENCOUNTER — TELEPHONE (OUTPATIENT)
Dept: INTERNAL MEDICINE | Facility: CLINIC | Age: 65
End: 2020-01-23

## 2020-03-24 DIAGNOSIS — I10 ESSENTIAL HYPERTENSION: ICD-10-CM

## 2020-03-24 DIAGNOSIS — R05.9 COUGH: ICD-10-CM

## 2020-03-24 RX ORDER — AMLODIPINE BESYLATE 10 MG/1
TABLET ORAL
Qty: 30 TABLET | Refills: 1 | OUTPATIENT
Start: 2020-03-24

## 2020-03-24 RX ORDER — IBUPROFEN 600 MG/1
TABLET, FILM COATED ORAL
Qty: 30 TABLET | Refills: 3 | OUTPATIENT
Start: 2020-03-24

## 2020-03-24 NOTE — TELEPHONE ENCOUNTER
Per Care Everywhere patient has established care with [facility]. Request denied.

## 2020-03-24 NOTE — TELEPHONE ENCOUNTER
"Requested Prescriptions   Pending Prescriptions Disp Refills     amLODIPine (NORVASC) 10 MG tablet [Pharmacy Med Name: AMLODIPINE 10MG   TAB]    Last Written Prescription Date:  12/17/19  Last Fill Quantity: 30,  # refills: 1   Last office visit: 1/6/2020 with prescribing provider:  Kandi   Future Office Visit:     30 tablet 1     Sig: TAKE 1 TABLET BY MOUTH DAILY       Calcium Channel Blockers Protocol  Passed - 3/24/2020 11:57 AM        Passed - Blood pressure under 140/90 in past 12 months     BP Readings from Last 3 Encounters:   01/06/20 124/66   12/17/19 (!) 144/90   12/12/19 (!) 179/104                 Passed - Recent (12 mo) or future (30 days) visit within the authorizing provider's specialty     Patient has had an office visit with the authorizing provider or a provider within the authorizing providers department within the previous 12 mos or has a future within next 30 days. See \"Patient Info\" tab in inbasket, or \"Choose Columns\" in Meds & Orders section of the refill encounter.              Passed - Medication is active on med list        Passed - Patient is age 18 or older        Passed - No active pregnancy on record        Passed - Normal serum creatinine on file in past 12 months     Recent Labs   Lab Test 12/17/19  1149   CR 0.68       Ok to refill medication if creatinine is low          Passed - No positive pregnancy test in past 12 months           ibuprofen (ADVIL/MOTRIN) 600 MG tablet [Pharmacy Med Name: IBUPROFEN   600MG TAB]    Last Written Prescription Date:  1/6/20  Last Fill Quantity: 30,  # refills: 3   Last office visit: 1/6/2020 with prescribing provider:  Kandi   Future Office Visit:     30 tablet 3     Sig: TAKE 1 TABLET BY MOUTH EVERY 6 HOURS AS NEEDED FOR MODERATE PAIN.       NSAID Medications Passed - 3/24/2020 11:57 AM        Passed - Blood pressure under 140/90 in past 12 months     BP Readings from Last 3 Encounters:   01/06/20 124/66   12/17/19 (!) 144/90   12/12/19 (!) " "179/104                 Passed - Normal ALT on file in past 12 months     Recent Labs   Lab Test 12/17/19  1149   ALT 18             Passed - Normal AST on file in past 12 months     Recent Labs   Lab Test 12/17/19  1149   AST 14             Passed - Recent (12 mo) or future (30 days) visit within the authorizing provider's specialty     Patient has had an office visit with the authorizing provider or a provider within the authorizing providers department within the previous 12 mos or has a future within next 30 days. See \"Patient Info\" tab in inbasket, or \"Choose Columns\" in Meds & Orders section of the refill encounter.              Passed - Patient is age 6-64 years        Passed - Normal CBC on file in past 12 months     Recent Labs   Lab Test 12/17/19  1149   WBC 7.6   RBC 4.31   HGB 12.0   HCT 38.3                    Passed - Medication is active on med list        Passed - No active pregnancy on record        Passed - Normal serum creatinine on file in past 12 months     Recent Labs   Lab Test 12/17/19  1149   CR 0.68       Ok to refill medication if creatinine is low          Passed - No positive pregnancy test in past 12 months              "

## 2020-03-25 DIAGNOSIS — G89.4 CHRONIC PAIN SYNDROME: ICD-10-CM

## 2020-03-25 RX ORDER — TOPIRAMATE 50 MG/1
TABLET, FILM COATED ORAL
Qty: 135 TABLET | Refills: 11 | Status: SHIPPED | OUTPATIENT
Start: 2020-03-25 | End: 2021-07-05

## 2020-03-25 NOTE — TELEPHONE ENCOUNTER
"Requested Prescriptions   Pending Prescriptions Disp Refills     topiramate (TOPAMAX) 50 MG tablet [Pharmacy Med Name: TOPIRAMATE 50MG TAB]  Last Written Prescription Date:  12/17/2019  Last Fill Quantity: 90,  # refills: 3   Last office visit: 1/6/2020 with prescribing provider:     Future Office Visit:   90 tablet 3     Sig: TAKE 1 AND 1/2 TABLETS BY MOUTH THREE TIMES A DAY       Anti-Seizure Meds Protocol  Failed - 3/25/2020  1:36 PM        Failed - Review Authorizing provider's last note.      Refer to last progress notes: confirm request is for original authorizing provider (cannot be through other providers).          Passed - Recent (12 mo) or future (30 days) visit within the authorizing provider's specialty     Patient has had an office visit with the authorizing provider or a provider within the authorizing providers department within the previous 12 mos or has a future within next 30 days. See \"Patient Info\" tab in inbasket, or \"Choose Columns\" in Meds & Orders section of the refill encounter.              Passed - Normal CBC on file in past 26 months     Recent Labs   Lab Test 12/17/19  1149   WBC 7.6   RBC 4.31   HGB 12.0   HCT 38.3                    Passed - Normal ALT or AST on file in past 26 months     Recent Labs   Lab Test 12/17/19  1149   ALT 18     Recent Labs   Lab Test 12/17/19  1149   AST 14             Passed - Normal platelet count on file in past 26 months     Recent Labs   Lab Test 12/17/19  1149                  Passed - Medication is active on med list        Passed - No active pregnancy on record        Passed - No positive pregnancy test in last 12 months           "

## 2020-03-25 NOTE — TELEPHONE ENCOUNTER
Topamax refill request. She takes this 3 times daily.     Last OV on 1/6/20    Provider approval needed.

## 2020-06-22 DIAGNOSIS — M79.7 FIBROMYALGIA: ICD-10-CM

## 2020-06-23 RX ORDER — GABAPENTIN 600 MG/1
TABLET ORAL
Qty: 180 TABLET | Refills: 3 | Status: SHIPPED | OUTPATIENT
Start: 2020-06-23 | End: 2021-07-02

## 2020-06-23 NOTE — TELEPHONE ENCOUNTER
Pending Prescriptions:                       Disp   Refills    gabapentin (NEURONTIN) 600 MG tablet [Phar*180 ta*3        Sig: TAKE 2 TABLETS BY MOUTH THREE TIMES A DAY    Routing refill request to provider for review/approval because:  Drug not on the [facility] refill protocol

## 2020-09-25 DIAGNOSIS — K58.0 IRRITABLE BOWEL SYNDROME WITH DIARRHEA: ICD-10-CM

## 2020-09-25 RX ORDER — DICYCLOMINE HCL 20 MG
TABLET ORAL
Qty: 90 TABLET | Refills: 4 | Status: SHIPPED | OUTPATIENT
Start: 2020-09-25

## 2020-09-25 NOTE — TELEPHONE ENCOUNTER
"Requested Prescriptions   Pending Prescriptions Disp Refills     dicyclomine (BENTYL) 20 MG tablet [Pharmacy Med Name: DICYCLOMINE 20MG TAB] 90 tablet 3     Sig: TAKE 1 TABLET BY MOUTH FOUR TIMES A DAY AS NEEDED       Oral Anticholinergic Agents Passed - 9/25/2020 10:47 AM        Passed - Patient is of age 12 or older        Passed - Recent (12 mo) or future (30 days) visit with authorizing provider's specialty     Patient has had an office visit with the authorizing provider or a provider within the authorizing providers department within the previous 12 mos or has a future within next 30 days. See \"Patient Info\" tab in inbasket, or \"Choose Columns\" in Meds & Orders section of the refill encounter.              Passed - Medication is active on med list        Passed - Patient is not pregnant        Passed - No positive pregnancy test on file within past 12 months             Last office visit 1-6-20    Prescription approved per [facility] Refill Protocol.          "

## 2021-02-09 DIAGNOSIS — I10 BENIGN ESSENTIAL HYPERTENSION: ICD-10-CM

## 2021-02-11 RX ORDER — LOSARTAN POTASSIUM 25 MG/1
TABLET ORAL
Qty: 30 TABLET | Refills: 0 | Status: SHIPPED | OUTPATIENT
Start: 2021-02-11

## 2021-02-11 NOTE — TELEPHONE ENCOUNTER
Routing refill request to provider for review/approval because:  A break in medication  Patient needs to be seen because it has been more than 1 year since last office visit.

## 2021-03-15 DIAGNOSIS — K21.9 GASTROESOPHAGEAL REFLUX DISEASE WITHOUT ESOPHAGITIS: ICD-10-CM

## 2021-03-16 NOTE — TELEPHONE ENCOUNTER
Medication is being filled for 1 time refill only due to:  Patient needs to be seen because it has been more than one year since last visit.    Please call patient and assist with scheduling prior to additional refills.    Thuy SANDOVAL - Registered Nurse  [facility]  Acute and Diagnostic Services

## 2021-04-20 NOTE — LETTER
[facility] 
[phone] Social Work Admission Note Patient Name: Nabeel Munoz YOB: 1941 Age: 70 y.o. Date of Visit: 04/20/21 Facility of Care: [facility] [place] Patient Room: 405/01 Hospice Attending: Yancy Williamson MD 
Hospice Diagnosis: Acute respiratory failure ([address] [J96.00] Level of Care:  
 [x]  GIP []  Respite 
 []  Routine Consents/NCD Documentation:  
 
Consents Reviewed: Yes Person Reviewed/Signed with: Jazzy Berry Right to NCD Reviewed: Yes NCD Requested: Yes Admission Nurse/Intake Notified NCD was requested: N/A Planned Start of Care Date: 4/20/2021 Hospice Witness Representative: Chloe Otto LCSW, MSG 
 
NARRATIVE This NYDIAW and Dru Harry RN met with pt, who is on Bipap and minimally responsive, and her daughter Jazzy Berry and Omkar Burgesse. Pt has a hospice dx of ARF, pt has comorbid COPD, HTN, DM II, CKD, CHF, ans CKD IV. Bipap was removed. Pt was admitted 4/17/ 2021 from home due to worsening SOB. Pt has been living with her daughter Layo Max for the past 3 - 4 yrs. Daughter report the family is very close. 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 Per daughters pt enjoys cooking, flowers, and the beach. Daughters are processing pts rapid decline since her hospital admission. LCSW provided emotional support and supportive counseling for daughters Layo Max and Moses Sanchez in processing pts sudden decline. LCSW provided grief support for daughter in coping with anticipatory grief and relational loss. LCSW and family dicussed being present at bedside. LCSW encourgaed reminiscing. LCSW and family discussed grand children ( 23, 16 and 15) coping and coming to bedside. per Ernstlanny Sanchez her children are very close and connected to their grandmother. LCSW will share resources for talking to children. Both daughter are working pat time.  LCSW and daughter discussed any paperwork they [facility]  [address]  [facility] [address]  Phone: [phone]        March 18, 2021      Lilliana Cardenas                                                                                                                                [id number]   [address]  [facility] [address]            Dear Ms. Cardenas,    We are concerned about your health care.  We recently provided you with a medication refill.  Many medications require routine follow-up with your Doctor.      At this time we ask that: You schedule an appointment for your annual physical.    Your prescription: Has been refilled for a 90 day supply so you may have time for the above noted follow-up.      Thank you,      [facility]      might need. LCSW provided education re the EOL process and shared GFMS. LCSW normalized pt not eating. LCSW will continue to assess and monitor pt and family needs. ADVANCE CARE PLANNING Code Status: DDNR completed Durable DNR: _X Yes  _ No 
No flowsheet data found. Relationship Status: 
[]  Single    
[]       
[]     
[]  Domestic Partner    
[x]  / 
[]  Common Law 
[]   
[]  Unknown If in a relationship, name of partner/spouse: 
Duration of relationship: 
 
Hinduism: [place]: Family is deciding upon a FH in [place] Resources Provided: GFMS, Talking to Children about Death and Dying Social Work Initial Assessment Gender: 
female Race/Ethnicity: (dwight all that apply) []  [place] or [place] Native 
[]  [place] 
[]  [place] or [place] 
[]   or  
[]  Native Sanjay or Eusebio 
[x]  Jerry Clark 
[]  Unknown  Service:   
[]  Yes  
[x]  No      
[]  Unknown Appropriate for Pinning Ceremony:  
[]  Yes     
[]  No 
Is patient using VA benefits? []  Yes     
[]  No 
  
Primary Language: English  
[]   Needed 
[]   utilized during visit Ability to express thoughts/needs/feelings 
[]  Expressed thoughts/feelings/needs without difficulty 
[]  Requires extra time and cuing 
[]  Speech limited single words 
[]  Uses only gestures (eye, blinking eye or head movement/pointing) []  Unable to express thoughts/feelings/needs (speech unintelligible or inappropriate) [x]  Unresponsive Notes:  
  
Mental Status: 
[]  Alert-oriented to:   
 []  Person   
 []  Place   
 []  Time 
[]  Comatose-responds to:  
 []   Verbal stimuli  
 []  Tactile stimuli  
 []  Painful stimuli 
[]  Forgetful 
[]  Disoriented/Confused 
[]  Lethargic 
[]  Agitated 
[x]  Other (specify): Unresponsive Notes:  
  
Patients description of Illness/Current Health Status:   
[x]  Patient unable to discuss, Unresponsive 
 
[]  Patient unwilling to discuss 
[]  (Specify) Knowledge/Understanding of Disease Process Patient:  
 []  Demonstrates knowledge/understanding of disease process 
 []  Demonstrates knowledge/understanding of treatment plan 
 []  Demonstrates knowledge/understanding of prognosis []  Demonstrates acceptance of prognosis []  Demonstrates knowledge/understanding of resuscitation status [x]  Other (specify), Unresponsive Caregiver: 
 [x]  Demonstrates knowledge/understanding of disease process [x]  Demonstrates knowledge/understanding of treatment plan 
 [x]  Demonstrates knowledge/understanding of prognosis [x]  Demonstrates acceptance of prognosis [x]  Demonstrates knowledge/understanding of resuscitation status 
 []  Other (specify) Notes:  
  
Patients living arrangement/care setting: 
Use the PRIOR COLUMN when the PATIENTS current health status necessitated a change in his/her primary residence. Prior Current Response  
           []             []    Patients own home/residence [x]             []    Home of family member/friend []             []    Boarding home  
           []             []    Assisted living facility/correction center []             []    Hospital/Acute care facility []             []    Skilled nursing facility []             []    Long term care facility/Nursing home  
           []             [x]    Hospice in Patient Primary Caregiver: 
[]  No Primary Caregiver Name of Primary Caregiver: Jose Guerrero Relationship or Primary Caregiver:  
 []  Spouse/Significant other     
 [x]  Natural Child      
 []  Step child     
 []  Sibling 
 []  Parent 
 []  Friend/Neighbor 
 []  Community/Islam Volunteer 
 []  Paid help 
 []  Other (specify):___________ Notes:   
  
Family members/Significant others: 
Name: Jose Guerrero Relationship: daughter Phone Number:[phone]/ C [phone] Actively involved in care? [x]  Yes  []  No 
 
Name: Adriana Arora Relationship: daughter Phone Number:[phone] Actively involved in care? [x]  Yes  []  No 
 
Name: 
Relationship: 
Phone Number: Actively involved in care? []  Yes  []  No 
 
Social support systems: (select ONE best description) []  Excellent social support system which includes three or more family members or friends 
[]  Good social support system which includes two or less members or friends 
[]  [address] support which includes one family member or friend 
[]  Poor social support; no family members or friends; basically ALONE Notes:  
  
Emotional Status: (dwight all that apply) Patient Caregiver Response [x]                [x]    Mood/Affect stable and appropriate []                []    Angry  
              []                []    Anxious []                []    Apprehensive []                []    Avoidant  
              []                []    Clinging  
              []                []    Depressed  
              []                []    Distraught  
              []                []    Elated []                []    Euphoric  
              []                []    Fearful  
              []                []    Flat Affect  
              []                []    Helpless []                []    Hostile []                []    Impulsive []                []    Irritable  
              []                []    Labile  
              []                []    Manic  
              []                []    Restlessness []                [x]    Sad  
              []                []    Suspicious []                [x]    Tearful  
              []                []    Withdrawn Notes:  
 
Coping Skills (strengths/weakness):  
 Patient: Coping Skills (strength/weakness): Unresponsive Family/caregiver (strength/weakness): Well supported, close family/ quick decline in hospital, family reports she has been declining gradually for a year. [place] of care (dwight all that apply):    
[]  No burden evident  
[]  Family must administer medications  
[]  Illness causing financial strain  
[x]  Family/Support feels overwhelmed  
[]  Family/Support sleep disturbed with patients care  
[]  Patients care causes extra physical stress  of death 
[]  Illness causes changes in family lifestyle 
[]  Illness impacting family/support employment 
[]  Family experiencing increased time demands 
[]  Patients behavior endangers family 
[]  Denial of patients illness 
[]  Concern over outcome of illness/fear 
[]  Patients behavior embarrassing to family Notes:  
  
Risk Factors: (dwight all that apply):   
[x]  No burden evident  
[]  Alcohol abuse 
[]  Financial resources inadequate to meet basic needs (food/house/etc) []  Financial resources inadequate to meet health care needs (supplies/equipment/medications) 
[]  Food/nutrition resources inadequate 
[]  Home environment unsafe/inadequate for home care 
[]  Homicidal risk 
[]  Lives alone or without concerned relatives 
[]  Multiple medications/complex schedule 
[]  Physical limitations increase likelihood of falls 
[]  Plan of care/treatments complicated 
[]  Substance use/abuse 
[]  Suicidal risk 
[]  Visual impairment threatens safety/ability to perform self-care 
[]  Other (specify): Abuse/Neglect (actual/potential risks): 
[x]  No signs of abuse/neglect 
[]  History of abuse/neglect                 []  Physical          []  Sexual 
[]  History of domestic violence 
[]  Lacks adequate physical care 
[]  Lacks emotional nurturing/support 
[]  Lacks appropriate stimulation/cognitive experiences 
[]  Left alone inappropriately 
[]  Lacks necessary supervision 
[]  Inadequate or delayed medical care 
[]  Unsafe environment (i.e guns/drug use/history of violence in the home/etc.) []  Bruising or other physical signs of injury present 
[]  Other (specify): 
Notes:  
[]  Refer to child/adult protective services Current Sources of Stress (in Addition to Current Illness):  
[x]  None reported 
[]  Bills/Debt   
[]  Career/Job change   
[]   (short term) []   (long term)   
[]  Death of a child (recent) []  Death of a parent (recent) []  Death of a spouse (recent) []  Employment status changed  
[]  Family discord   
[]  Financial loss/Inadequate inther (specify):come 
[]  Job loss 
[]  Legal issues unresolved 
[]  Lifestyle change 
[]  Marital discord 
[]  Marriage within the last year 
[]  Paperwork (insurance/legal/etc) overwhelming 
[]  Separation/Divorce 
[]  Other (specify): 
Notes:  
  
Current Community Resources Being Utilized 1. Interventions/Plan of Care 1. Assess social and emotional factors related to coping with end of life issues 2. Community resource planning/referral  
3. Relocation to different care setting if/when symptoms stabilize 4. Discharge Planning 1. Home if stable MSW Assessment Completed by: Carissa Raya 04/20/21 Time In:1: 30 pm     
Time Out: 3:00 pm

## 2021-07-02 DIAGNOSIS — F41.8 DEPRESSION WITH ANXIETY: ICD-10-CM

## 2021-07-02 DIAGNOSIS — G89.4 CHRONIC PAIN SYNDROME: ICD-10-CM

## 2021-07-02 DIAGNOSIS — M79.7 FIBROMYALGIA: ICD-10-CM

## 2021-07-02 RX ORDER — GABAPENTIN 600 MG/1
TABLET ORAL
Qty: 180 TABLET | Refills: 3 | Status: SHIPPED | OUTPATIENT
Start: 2021-07-02 | End: 2022-09-30

## 2021-07-05 RX ORDER — DULOXETIN HYDROCHLORIDE 60 MG/1
CAPSULE, DELAYED RELEASE ORAL
Qty: 30 CAPSULE | Refills: 1 | Status: SHIPPED | OUTPATIENT
Start: 2021-07-05

## 2021-07-05 RX ORDER — TOPIRAMATE 50 MG/1
TABLET, FILM COATED ORAL
Qty: 135 TABLET | Refills: 11 | Status: SHIPPED | OUTPATIENT
Start: 2021-07-05

## 2021-07-05 NOTE — TELEPHONE ENCOUNTER
Pending Prescriptions:                       Disp   Refills    DULoxetine (CYMBALTA) 60 MG capsule [Pharm*30 cap*         Sig: TAKE 1 CAPSULE BY MOUTH DAILY    topiramate (TOPAMAX) 50 MG tablet [Pharmac*135 ta*11       Sig: TAKE 1 AND 1/2 TABLETS BY MOUTH THREE TIMES A DAY    Routing refill request to provider for review/approval because:  Needs provider approval    PHQ-9 score:    PHQ 12/17/2019   PHQ-9 Total Score 20   Q9: Thoughts of better off dead/self-harm past 2 weeks More than half the days   F/U: Thoughts of suicide or self-harm -   F/U: Self harm-plan -   F/U: Self-harm action -   F/U: Safety concerns -

## 2021-10-19 PROBLEM — F32.9 MAJOR DEPRESSION: Status: ACTIVE | Noted: 2018-10-17

## 2022-05-10 NOTE — TELEPHONE ENCOUNTER
BP elevated at ER visit.   Per Bibi's 12/10/18 office visit note: F/u 3 months and prn    Pramipexole - Routing refill request to provider for review/approval because:  Medication is reported/historical - has not been ordered by our office before.     Ropinirole - Prescription approved per [facility] Refill Protocol.    Back Pain  Biomed Compound cream will be sent. Please call them in a week if you do not hear from them     If no improvement, check x ray and physical therapy     Prema Prakash and Bernardo More script will be sent to your DME  If you do not hear anything in a month, please call us!     Rash  Ointment Refilled    Diabetes  Well controlled   I will see where you can get diabetes shoes since ou have neuropathy

## 2022-05-25 DIAGNOSIS — K21.9 GASTROESOPHAGEAL REFLUX DISEASE WITHOUT ESOPHAGITIS: ICD-10-CM

## 2022-05-27 NOTE — TELEPHONE ENCOUNTER
LOV 01/06/2020 with Bibi Chau    Patient does not have active phone number and has not made an appointment since last 1x kori refill- 3/15/2021    Omeprazole 20 mg  Routing refill request to provider for review/approval because:  Kori given x1 and patient did not follow up, please advise  Break in medication  Patient needs to be seen because it has been more than 1 year since last office visit.

## 2022-09-29 DIAGNOSIS — M79.7 FIBROMYALGIA: ICD-10-CM

## 2022-09-29 NOTE — TELEPHONE ENCOUNTER
Pending Prescriptions:                       Disp   Refills    gabapentin (NEURONTIN) 600 MG tablet [Phar*180 ta*3        Sig: TAKE 2 TABLETS BY MOUTH THREE TIMES A DAY    Routing refill request to provider for review/approval because:  Drug not on the [facility] refill protocol     Please advise, thanks.

## 2022-09-30 RX ORDER — GABAPENTIN 600 MG/1
TABLET ORAL
Qty: 180 TABLET | Refills: 3 | Status: SHIPPED | OUTPATIENT
Start: 2022-09-30

## 2022-12-08 NOTE — ED NOTES
" called back spoke with RN stated she wanted patient to call [facility] and provided number.  states she \"has no other resources to offer patient and that if she could not find a place suggested she stay in a library or bus shelter\"  Phone number given to patient and she states she will call to try to find a shelter for the night.   " None

## 2023-05-12 ENCOUNTER — LAB REQUISITION (OUTPATIENT)
Dept: LAB | Facility: CLINIC | Age: 68
End: 2023-05-12
Payer: COMMERCIAL

## 2023-05-12 DIAGNOSIS — K92.2 GASTROINTESTINAL HEMORRHAGE, UNSPECIFIED: ICD-10-CM

## 2023-05-16 LAB
ANION GAP SERPL CALCULATED.3IONS-SCNC: 16 MMOL/L (ref 7–15)
BASOPHILS # BLD AUTO: 0.1 10E3/UL (ref 0–0.2)
BASOPHILS NFR BLD AUTO: 1 %
BUN SERPL-MCNC: 6.6 MG/DL (ref 8–23)
CALCIUM SERPL-MCNC: 8.7 MG/DL (ref 8.8–10.2)
CHLORIDE SERPL-SCNC: 105 MMOL/L (ref 98–107)
CREAT SERPL-MCNC: 0.67 MG/DL (ref 0.51–0.95)
DEPRECATED HCO3 PLAS-SCNC: 19 MMOL/L (ref 22–29)
EOSINOPHIL # BLD AUTO: 0.4 10E3/UL (ref 0–0.7)
EOSINOPHIL NFR BLD AUTO: 3 %
ERYTHROCYTE [DISTWIDTH] IN BLOOD BY AUTOMATED COUNT: 15.7 % (ref 10–15)
GFR SERPL CREATININE-BSD FRML MDRD: >90 ML/MIN/1.73M2
GLUCOSE SERPL-MCNC: 95 MG/DL (ref 70–99)
HCT VFR BLD AUTO: 36.6 % (ref 35–47)
HGB BLD-MCNC: 10.9 G/DL (ref 11.7–15.7)
IMM GRANULOCYTES # BLD: 0 10E3/UL
IMM GRANULOCYTES NFR BLD: 0 %
LYMPHOCYTES # BLD AUTO: 2.5 10E3/UL (ref 0.8–5.3)
LYMPHOCYTES NFR BLD AUTO: 22 %
MCH RBC QN AUTO: 28.2 PG (ref 26.5–33)
MCHC RBC AUTO-ENTMCNC: 29.8 G/DL (ref 31.5–36.5)
MCV RBC AUTO: 95 FL (ref 78–100)
MONOCYTES # BLD AUTO: 0.7 10E3/UL (ref 0–1.3)
MONOCYTES NFR BLD AUTO: 6 %
NEUTROPHILS # BLD AUTO: 7.6 10E3/UL (ref 1.6–8.3)
NEUTROPHILS NFR BLD AUTO: 68 %
NRBC # BLD AUTO: 0 10E3/UL
NRBC BLD AUTO-RTO: 0 /100
PLATELET # BLD AUTO: 270 10E3/UL (ref 150–450)
POTASSIUM SERPL-SCNC: 3.9 MMOL/L (ref 3.4–5.3)
RBC # BLD AUTO: 3.87 10E6/UL (ref 3.8–5.2)
SODIUM SERPL-SCNC: 140 MMOL/L (ref 136–145)
WBC # BLD AUTO: 11.3 10E3/UL (ref 4–11)

## 2023-05-16 PROCEDURE — P9603 ONE-WAY ALLOW PRORATED MILES: HCPCS

## 2023-05-16 PROCEDURE — 85025 COMPLETE CBC W/AUTO DIFF WBC: CPT | Mod: ORL

## 2023-05-16 PROCEDURE — 36415 COLL VENOUS BLD VENIPUNCTURE: CPT

## 2023-05-16 PROCEDURE — 80048 BASIC METABOLIC PNL TOTAL CA: CPT | Mod: ORL

## 2023-05-19 ENCOUNTER — TRANSCRIBE ORDERS (OUTPATIENT)
Dept: OTHER | Age: 68
End: 2023-05-19

## 2023-05-23 ENCOUNTER — LAB REQUISITION (OUTPATIENT)
Dept: LAB | Facility: CLINIC | Age: 68
End: 2023-05-23
Payer: COMMERCIAL

## 2023-05-23 DIAGNOSIS — E83.51 HYPOCALCEMIA: ICD-10-CM

## 2023-05-23 DIAGNOSIS — R73.9 HYPERGLYCEMIA, UNSPECIFIED: ICD-10-CM

## 2023-05-24 LAB
ANION GAP SERPL CALCULATED.3IONS-SCNC: 13 MMOL/L (ref 7–15)
BUN SERPL-MCNC: 10.1 MG/DL (ref 8–23)
CALCIUM SERPL-MCNC: 9.9 MG/DL (ref 8.8–10.2)
CHLORIDE SERPL-SCNC: 106 MMOL/L (ref 98–107)
CREAT SERPL-MCNC: 0.61 MG/DL (ref 0.51–0.95)
DEPRECATED HCO3 PLAS-SCNC: 22 MMOL/L (ref 22–29)
GFR SERPL CREATININE-BSD FRML MDRD: >90 ML/MIN/1.73M2
GLUCOSE SERPL-MCNC: 95 MG/DL (ref 70–99)
POTASSIUM SERPL-SCNC: 4 MMOL/L (ref 3.4–5.3)
SODIUM SERPL-SCNC: 141 MMOL/L (ref 136–145)

## 2023-05-24 PROCEDURE — 36415 COLL VENOUS BLD VENIPUNCTURE: CPT | Performed by: FAMILY MEDICINE

## 2023-05-24 PROCEDURE — P9604 ONE-WAY ALLOW PRORATED TRIP: HCPCS | Mod: ORL | Performed by: FAMILY MEDICINE

## 2023-05-24 PROCEDURE — 80048 BASIC METABOLIC PNL TOTAL CA: CPT | Mod: ORL | Performed by: FAMILY MEDICINE

## 2023-07-19 ENCOUNTER — TRANSCRIBE ORDERS (OUTPATIENT)
Dept: OTHER | Age: 68
End: 2023-07-19

## 2023-07-19 DIAGNOSIS — I63.449: Primary | ICD-10-CM

## 2023-07-19 DIAGNOSIS — I50.22 CHRONIC SYSTOLIC CONGESTIVE HEART FAILURE (H): ICD-10-CM

## 2023-10-26 ENCOUNTER — LAB REQUISITION (OUTPATIENT)
Dept: LAB | Facility: CLINIC | Age: 68
End: 2023-10-26
Payer: COMMERCIAL

## 2023-10-26 DIAGNOSIS — I50.9 HEART FAILURE, UNSPECIFIED (H): ICD-10-CM

## 2023-10-27 LAB
ALBUMIN SERPL BCG-MCNC: 3.9 G/DL (ref 3.5–5.2)
ALP SERPL-CCNC: 80 U/L (ref 35–104)
ALT SERPL W P-5'-P-CCNC: <5 U/L (ref 0–50)
ANION GAP SERPL CALCULATED.3IONS-SCNC: 11 MMOL/L (ref 7–15)
AST SERPL W P-5'-P-CCNC: 19 U/L (ref 0–45)
BASOPHILS # BLD AUTO: 0.1 10E3/UL (ref 0–0.2)
BASOPHILS NFR BLD AUTO: 1 %
BILIRUB SERPL-MCNC: <0.2 MG/DL
BUN SERPL-MCNC: 14.2 MG/DL (ref 8–23)
CALCIUM SERPL-MCNC: 8.5 MG/DL (ref 8.8–10.2)
CHLORIDE SERPL-SCNC: 105 MMOL/L (ref 98–107)
CREAT SERPL-MCNC: 0.73 MG/DL (ref 0.51–0.95)
DEPRECATED HCO3 PLAS-SCNC: 23 MMOL/L (ref 22–29)
EGFRCR SERPLBLD CKD-EPI 2021: 90 ML/MIN/1.73M2
EOSINOPHIL # BLD AUTO: 0.2 10E3/UL (ref 0–0.7)
EOSINOPHIL NFR BLD AUTO: 2 %
ERYTHROCYTE [DISTWIDTH] IN BLOOD BY AUTOMATED COUNT: 13.6 % (ref 10–15)
GLUCOSE SERPL-MCNC: 126 MG/DL (ref 70–99)
HCT VFR BLD AUTO: 29.8 % (ref 35–47)
HGB BLD-MCNC: 9 G/DL (ref 11.7–15.7)
IMM GRANULOCYTES # BLD: 0 10E3/UL
IMM GRANULOCYTES NFR BLD: 0 %
LYMPHOCYTES # BLD AUTO: 2.3 10E3/UL (ref 0.8–5.3)
LYMPHOCYTES NFR BLD AUTO: 25 %
MCH RBC QN AUTO: 26 PG (ref 26.5–33)
MCHC RBC AUTO-ENTMCNC: 30.2 G/DL (ref 31.5–36.5)
MCV RBC AUTO: 86 FL (ref 78–100)
MONOCYTES # BLD AUTO: 0.6 10E3/UL (ref 0–1.3)
MONOCYTES NFR BLD AUTO: 7 %
NEUTROPHILS # BLD AUTO: 6 10E3/UL (ref 1.6–8.3)
NEUTROPHILS NFR BLD AUTO: 65 %
NRBC # BLD AUTO: 0 10E3/UL
NRBC BLD AUTO-RTO: 0 /100
PLATELET # BLD AUTO: 305 10E3/UL (ref 150–450)
POTASSIUM SERPL-SCNC: 3.9 MMOL/L (ref 3.4–5.3)
PROT SERPL-MCNC: 6.7 G/DL (ref 6.4–8.3)
RBC # BLD AUTO: 3.46 10E6/UL (ref 3.8–5.2)
SODIUM SERPL-SCNC: 139 MMOL/L (ref 135–145)
WBC # BLD AUTO: 9.1 10E3/UL (ref 4–11)

## 2023-10-27 PROCEDURE — 80053 COMPREHEN METABOLIC PANEL: CPT | Mod: ORL | Performed by: INTERNAL MEDICINE

## 2023-10-27 PROCEDURE — P9604 ONE-WAY ALLOW PRORATED TRIP: HCPCS | Mod: ORL | Performed by: INTERNAL MEDICINE

## 2023-10-27 PROCEDURE — 36415 COLL VENOUS BLD VENIPUNCTURE: CPT | Mod: ORL | Performed by: INTERNAL MEDICINE

## 2023-10-27 PROCEDURE — 85025 COMPLETE CBC W/AUTO DIFF WBC: CPT | Mod: ORL | Performed by: INTERNAL MEDICINE

## 2023-11-29 ENCOUNTER — LAB REQUISITION (OUTPATIENT)
Dept: LAB | Facility: CLINIC | Age: 68
End: 2023-11-29
Payer: COMMERCIAL

## 2023-11-29 DIAGNOSIS — E11.40 TYPE 2 DIABETES MELLITUS WITH DIABETIC NEUROPATHY, UNSPECIFIED (H): ICD-10-CM

## 2023-11-29 DIAGNOSIS — G25.81 RESTLESS LEGS SYNDROME: ICD-10-CM

## 2023-12-07 LAB
ALBUMIN SERPL BCG-MCNC: 3.9 G/DL (ref 3.5–5.2)
ALP SERPL-CCNC: 91 U/L (ref 40–150)
ALT SERPL W P-5'-P-CCNC: 7 U/L (ref 0–50)
ANION GAP SERPL CALCULATED.3IONS-SCNC: 14 MMOL/L (ref 7–15)
AST SERPL W P-5'-P-CCNC: 18 U/L (ref 0–45)
BILIRUB SERPL-MCNC: <0.2 MG/DL
BUN SERPL-MCNC: 9.6 MG/DL (ref 8–23)
CALCIUM SERPL-MCNC: 8.6 MG/DL (ref 8.8–10.2)
CHLORIDE SERPL-SCNC: 98 MMOL/L (ref 98–107)
CREAT SERPL-MCNC: 0.88 MG/DL (ref 0.51–0.95)
DEPRECATED HCO3 PLAS-SCNC: 23 MMOL/L (ref 22–29)
EGFRCR SERPLBLD CKD-EPI 2021: 72 ML/MIN/1.73M2
ERYTHROCYTE [DISTWIDTH] IN BLOOD BY AUTOMATED COUNT: 15.5 % (ref 10–15)
GLUCOSE SERPL-MCNC: 120 MG/DL (ref 70–99)
HBA1C MFR BLD: 6.3 %
HCT VFR BLD AUTO: 29.2 % (ref 35–47)
HGB BLD-MCNC: 8.8 G/DL (ref 11.7–15.7)
MAGNESIUM SERPL-MCNC: 1.9 MG/DL (ref 1.7–2.3)
MCH RBC QN AUTO: 25.8 PG (ref 26.5–33)
MCHC RBC AUTO-ENTMCNC: 30.1 G/DL (ref 31.5–36.5)
MCV RBC AUTO: 86 FL (ref 78–100)
PLATELET # BLD AUTO: 319 10E3/UL (ref 150–450)
POTASSIUM SERPL-SCNC: 4.2 MMOL/L (ref 3.4–5.3)
PROT SERPL-MCNC: 6.9 G/DL (ref 6.4–8.3)
RBC # BLD AUTO: 3.41 10E6/UL (ref 3.8–5.2)
SODIUM SERPL-SCNC: 135 MMOL/L (ref 135–145)
WBC # BLD AUTO: 9.2 10E3/UL (ref 4–11)

## 2023-12-07 PROCEDURE — 83735 ASSAY OF MAGNESIUM: CPT | Mod: ORL | Performed by: NURSE PRACTITIONER

## 2023-12-07 PROCEDURE — 85027 COMPLETE CBC AUTOMATED: CPT | Mod: ORL | Performed by: NURSE PRACTITIONER

## 2023-12-07 PROCEDURE — 80053 COMPREHEN METABOLIC PANEL: CPT | Mod: ORL | Performed by: NURSE PRACTITIONER

## 2023-12-07 PROCEDURE — 36415 COLL VENOUS BLD VENIPUNCTURE: CPT | Mod: ORL | Performed by: NURSE PRACTITIONER

## 2023-12-07 PROCEDURE — 83036 HEMOGLOBIN GLYCOSYLATED A1C: CPT | Mod: ORL | Performed by: NURSE PRACTITIONER

## 2023-12-07 PROCEDURE — P9603 ONE-WAY ALLOW PRORATED MILES: HCPCS | Mod: ORL | Performed by: NURSE PRACTITIONER

## 2023-12-13 ENCOUNTER — LAB REQUISITION (OUTPATIENT)
Dept: LAB | Facility: CLINIC | Age: 68
End: 2023-12-13
Payer: COMMERCIAL

## 2023-12-13 DIAGNOSIS — D64.9 ANEMIA, UNSPECIFIED: ICD-10-CM

## 2023-12-14 LAB
HGB BLD-MCNC: 8.4 G/DL (ref 11.7–15.7)
RETICS # AUTO: 0.06 10E6/UL (ref 0.03–0.1)
RETICS/RBC NFR AUTO: 1.7 % (ref 0.5–2)

## 2023-12-14 PROCEDURE — 85045 AUTOMATED RETICULOCYTE COUNT: CPT | Mod: ORL | Performed by: NURSE PRACTITIONER

## 2023-12-14 PROCEDURE — 85018 HEMOGLOBIN: CPT | Mod: ORL | Performed by: NURSE PRACTITIONER

## 2023-12-14 PROCEDURE — 83550 IRON BINDING TEST: CPT | Mod: ORL | Performed by: NURSE PRACTITIONER

## 2023-12-14 PROCEDURE — 82728 ASSAY OF FERRITIN: CPT | Mod: ORL | Performed by: NURSE PRACTITIONER

## 2023-12-14 PROCEDURE — 36415 COLL VENOUS BLD VENIPUNCTURE: CPT | Mod: ORL | Performed by: NURSE PRACTITIONER

## 2023-12-14 PROCEDURE — P9603 ONE-WAY ALLOW PRORATED MILES: HCPCS | Mod: ORL | Performed by: NURSE PRACTITIONER

## 2023-12-15 LAB
FERRITIN SERPL-MCNC: 14 NG/ML (ref 11–328)
IRON BINDING CAPACITY (ROCHE): 279 UG/DL (ref 240–430)
IRON SATN MFR SERPL: 7 % (ref 15–46)
IRON SERPL-MCNC: 20 UG/DL (ref 37–145)

## 2023-12-27 ENCOUNTER — LAB REQUISITION (OUTPATIENT)
Dept: LAB | Facility: CLINIC | Age: 68
End: 2023-12-27
Payer: COMMERCIAL

## 2023-12-27 DIAGNOSIS — K26.0 ACUTE DUODENAL ULCER WITH HEMORRHAGE: ICD-10-CM

## 2023-12-28 LAB — HGB BLD-MCNC: 8.8 G/DL (ref 11.7–15.7)

## 2023-12-28 PROCEDURE — P9603 ONE-WAY ALLOW PRORATED MILES: HCPCS | Mod: ORL | Performed by: NURSE PRACTITIONER

## 2023-12-28 PROCEDURE — 36415 COLL VENOUS BLD VENIPUNCTURE: CPT | Mod: ORL | Performed by: NURSE PRACTITIONER

## 2023-12-28 PROCEDURE — 85018 HEMOGLOBIN: CPT | Mod: ORL | Performed by: NURSE PRACTITIONER

## 2024-01-24 ENCOUNTER — LAB REQUISITION (OUTPATIENT)
Dept: LAB | Facility: CLINIC | Age: 69
End: 2024-01-24
Payer: COMMERCIAL

## 2024-01-24 DIAGNOSIS — K26.0 ACUTE DUODENAL ULCER WITH HEMORRHAGE: ICD-10-CM

## 2024-01-25 LAB
ERYTHROCYTE [DISTWIDTH] IN BLOOD BY AUTOMATED COUNT: 19 % (ref 10–15)
HCT VFR BLD AUTO: 35.3 % (ref 35–47)
HGB BLD-MCNC: 10.9 G/DL (ref 11.7–15.7)
MCH RBC QN AUTO: 26.8 PG (ref 26.5–33)
MCHC RBC AUTO-ENTMCNC: 30.9 G/DL (ref 31.5–36.5)
MCV RBC AUTO: 87 FL (ref 78–100)
PLATELET # BLD AUTO: 301 10E3/UL (ref 150–450)
RBC # BLD AUTO: 4.06 10E6/UL (ref 3.8–5.2)
WBC # BLD AUTO: 8.7 10E3/UL (ref 4–11)

## 2024-01-25 PROCEDURE — 36415 COLL VENOUS BLD VENIPUNCTURE: CPT | Performed by: NURSE PRACTITIONER

## 2024-01-25 PROCEDURE — 85027 COMPLETE CBC AUTOMATED: CPT | Performed by: NURSE PRACTITIONER

## 2024-01-25 PROCEDURE — P9604 ONE-WAY ALLOW PRORATED TRIP: HCPCS | Mod: ORL | Performed by: NURSE PRACTITIONER

## 2024-04-10 ENCOUNTER — LAB REQUISITION (OUTPATIENT)
Dept: LAB | Facility: CLINIC | Age: 69
End: 2024-04-10
Payer: COMMERCIAL

## 2024-04-10 DIAGNOSIS — K92.2 GASTROINTESTINAL HEMORRHAGE, UNSPECIFIED: ICD-10-CM

## 2024-04-11 LAB
ANION GAP SERPL CALCULATED.3IONS-SCNC: 14 MMOL/L (ref 7–15)
BASOPHILS # BLD AUTO: 0 10E3/UL (ref 0–0.2)
BASOPHILS NFR BLD AUTO: 1 %
BUN SERPL-MCNC: 10.2 MG/DL (ref 8–23)
CALCIUM SERPL-MCNC: 8.7 MG/DL (ref 8.8–10.2)
CHLORIDE SERPL-SCNC: 103 MMOL/L (ref 98–107)
CREAT SERPL-MCNC: 0.9 MG/DL (ref 0.51–0.95)
DEPRECATED HCO3 PLAS-SCNC: 24 MMOL/L (ref 22–29)
EGFRCR SERPLBLD CKD-EPI 2021: 70 ML/MIN/1.73M2
EOSINOPHIL # BLD AUTO: 0.2 10E3/UL (ref 0–0.7)
EOSINOPHIL NFR BLD AUTO: 2 %
ERYTHROCYTE [DISTWIDTH] IN BLOOD BY AUTOMATED COUNT: 14.3 % (ref 10–15)
GLUCOSE SERPL-MCNC: 147 MG/DL (ref 70–99)
HCT VFR BLD AUTO: 32.2 % (ref 35–47)
HGB BLD-MCNC: 10.8 G/DL (ref 11.7–15.7)
IMM GRANULOCYTES # BLD: 0 10E3/UL
IMM GRANULOCYTES NFR BLD: 0 %
LYMPHOCYTES # BLD AUTO: 2.1 10E3/UL (ref 0.8–5.3)
LYMPHOCYTES NFR BLD AUTO: 28 %
MCH RBC QN AUTO: 30.8 PG (ref 26.5–33)
MCHC RBC AUTO-ENTMCNC: 33.5 G/DL (ref 31.5–36.5)
MCV RBC AUTO: 92 FL (ref 78–100)
MONOCYTES # BLD AUTO: 0.4 10E3/UL (ref 0–1.3)
MONOCYTES NFR BLD AUTO: 6 %
NEUTROPHILS # BLD AUTO: 4.8 10E3/UL (ref 1.6–8.3)
NEUTROPHILS NFR BLD AUTO: 63 %
NRBC # BLD AUTO: 0 10E3/UL
NRBC BLD AUTO-RTO: 0 /100
PLATELET # BLD AUTO: 253 10E3/UL (ref 150–450)
POTASSIUM SERPL-SCNC: 3.7 MMOL/L (ref 3.4–5.3)
RBC # BLD AUTO: 3.51 10E6/UL (ref 3.8–5.2)
SODIUM SERPL-SCNC: 141 MMOL/L (ref 135–145)
WBC # BLD AUTO: 7.5 10E3/UL (ref 4–11)

## 2024-04-11 PROCEDURE — 80048 BASIC METABOLIC PNL TOTAL CA: CPT | Mod: ORL | Performed by: NURSE PRACTITIONER

## 2024-04-11 PROCEDURE — P9603 ONE-WAY ALLOW PRORATED MILES: HCPCS | Mod: ORL | Performed by: NURSE PRACTITIONER

## 2024-04-11 PROCEDURE — 85025 COMPLETE CBC W/AUTO DIFF WBC: CPT | Mod: ORL | Performed by: NURSE PRACTITIONER

## 2024-04-11 PROCEDURE — 36415 COLL VENOUS BLD VENIPUNCTURE: CPT | Mod: ORL | Performed by: NURSE PRACTITIONER

## 2024-05-22 ENCOUNTER — LAB REQUISITION (OUTPATIENT)
Dept: LAB | Facility: CLINIC | Age: 69
End: 2024-05-22
Payer: COMMERCIAL

## 2024-05-22 DIAGNOSIS — E55.9 VITAMIN D DEFICIENCY, UNSPECIFIED: ICD-10-CM

## 2024-05-22 DIAGNOSIS — E11.69 TYPE 2 DIABETES MELLITUS WITH OTHER SPECIFIED COMPLICATION (H): ICD-10-CM

## 2024-05-22 DIAGNOSIS — F32.4 MAJOR DEPRESSIVE DISORDER, SINGLE EPISODE, IN PARTIAL REMISSION (H): ICD-10-CM

## 2024-05-22 DIAGNOSIS — R56.9 UNSPECIFIED CONVULSIONS (H): ICD-10-CM

## 2024-05-22 DIAGNOSIS — E11.40 TYPE 2 DIABETES MELLITUS WITH DIABETIC NEUROPATHY, UNSPECIFIED (H): ICD-10-CM

## 2024-05-23 LAB
BASOPHILS # BLD AUTO: 0.1 10E3/UL (ref 0–0.2)
BASOPHILS NFR BLD AUTO: 1 %
EOSINOPHIL # BLD AUTO: 0.1 10E3/UL (ref 0–0.7)
EOSINOPHIL NFR BLD AUTO: 1 %
ERYTHROCYTE [DISTWIDTH] IN BLOOD BY AUTOMATED COUNT: 14.3 % (ref 10–15)
HBA1C MFR BLD: 5.9 %
HCT VFR BLD AUTO: 34.6 % (ref 35–47)
HGB BLD-MCNC: 11.4 G/DL (ref 11.7–15.7)
IMM GRANULOCYTES # BLD: 0 10E3/UL
IMM GRANULOCYTES NFR BLD: 0 %
LYMPHOCYTES # BLD AUTO: 2.6 10E3/UL (ref 0.8–5.3)
LYMPHOCYTES NFR BLD AUTO: 25 %
MCH RBC QN AUTO: 30.6 PG (ref 26.5–33)
MCHC RBC AUTO-ENTMCNC: 32.9 G/DL (ref 31.5–36.5)
MCV RBC AUTO: 93 FL (ref 78–100)
MONOCYTES # BLD AUTO: 0.6 10E3/UL (ref 0–1.3)
MONOCYTES NFR BLD AUTO: 6 %
NEUTROPHILS # BLD AUTO: 7.1 10E3/UL (ref 1.6–8.3)
NEUTROPHILS NFR BLD AUTO: 67 %
NRBC # BLD AUTO: 0 10E3/UL
NRBC BLD AUTO-RTO: 0 /100
PLATELET # BLD AUTO: 282 10E3/UL (ref 150–450)
RBC # BLD AUTO: 3.73 10E6/UL (ref 3.8–5.2)
WBC # BLD AUTO: 10.5 10E3/UL (ref 4–11)

## 2024-05-23 PROCEDURE — 80053 COMPREHEN METABOLIC PANEL: CPT | Mod: ORL | Performed by: NURSE PRACTITIONER

## 2024-05-23 PROCEDURE — 83036 HEMOGLOBIN GLYCOSYLATED A1C: CPT | Mod: ORL | Performed by: NURSE PRACTITIONER

## 2024-05-23 PROCEDURE — P9604 ONE-WAY ALLOW PRORATED TRIP: HCPCS | Mod: ORL | Performed by: NURSE PRACTITIONER

## 2024-05-23 PROCEDURE — 84443 ASSAY THYROID STIM HORMONE: CPT | Mod: ORL | Performed by: NURSE PRACTITIONER

## 2024-05-23 PROCEDURE — 80177 DRUG SCRN QUAN LEVETIRACETAM: CPT | Mod: ORL | Performed by: NURSE PRACTITIONER

## 2024-05-23 PROCEDURE — 85025 COMPLETE CBC W/AUTO DIFF WBC: CPT | Mod: ORL | Performed by: NURSE PRACTITIONER

## 2024-05-23 PROCEDURE — 80061 LIPID PANEL: CPT | Mod: ORL | Performed by: NURSE PRACTITIONER

## 2024-05-23 PROCEDURE — 82306 VITAMIN D 25 HYDROXY: CPT | Mod: ORL | Performed by: NURSE PRACTITIONER

## 2024-05-23 PROCEDURE — 36415 COLL VENOUS BLD VENIPUNCTURE: CPT | Mod: ORL | Performed by: NURSE PRACTITIONER

## 2024-05-24 LAB
ALBUMIN SERPL BCG-MCNC: 4.1 G/DL (ref 3.5–5.2)
ALP SERPL-CCNC: 90 U/L (ref 40–150)
ALT SERPL W P-5'-P-CCNC: <5 U/L (ref 0–50)
ANION GAP SERPL CALCULATED.3IONS-SCNC: 13 MMOL/L (ref 7–15)
AST SERPL W P-5'-P-CCNC: 17 U/L (ref 0–45)
BILIRUB SERPL-MCNC: 0.2 MG/DL
BUN SERPL-MCNC: 8.3 MG/DL (ref 8–23)
CALCIUM SERPL-MCNC: 8.6 MG/DL (ref 8.8–10.2)
CHLORIDE SERPL-SCNC: 105 MMOL/L (ref 98–107)
CHOLEST SERPL-MCNC: 110 MG/DL
CREAT SERPL-MCNC: 0.7 MG/DL (ref 0.51–0.95)
DEPRECATED HCO3 PLAS-SCNC: 23 MMOL/L (ref 22–29)
EGFRCR SERPLBLD CKD-EPI 2021: >90 ML/MIN/1.73M2
FASTING STATUS PATIENT QL REPORTED: YES
GLUCOSE SERPL-MCNC: 83 MG/DL (ref 70–99)
HDLC SERPL-MCNC: 46 MG/DL
LDLC SERPL CALC-MCNC: 44 MG/DL
LEVETIRACETAM SERPL-MCNC: 12.5 ΜG/ML (ref 10–40)
NONHDLC SERPL-MCNC: 64 MG/DL
POTASSIUM SERPL-SCNC: 4.2 MMOL/L (ref 3.4–5.3)
PROT SERPL-MCNC: 6.8 G/DL (ref 6.4–8.3)
SODIUM SERPL-SCNC: 141 MMOL/L (ref 135–145)
TRIGL SERPL-MCNC: 98 MG/DL
TSH SERPL DL<=0.005 MIU/L-ACNC: 0.89 UIU/ML (ref 0.3–4.2)
VIT D+METAB SERPL-MCNC: 18 NG/ML (ref 20–50)

## 2024-12-31 ENCOUNTER — LAB REQUISITION (OUTPATIENT)
Dept: LAB | Facility: CLINIC | Age: 69
End: 2024-12-31
Payer: COMMERCIAL

## 2024-12-31 DIAGNOSIS — E11.40 TYPE 2 DIABETES MELLITUS WITH DIABETIC NEUROPATHY, UNSPECIFIED (H): ICD-10-CM

## 2025-01-02 LAB
ALBUMIN SERPL BCG-MCNC: 3.6 G/DL (ref 3.5–5.2)
ALP SERPL-CCNC: 88 U/L (ref 40–150)
ALT SERPL W P-5'-P-CCNC: 5 U/L (ref 0–50)
ANION GAP SERPL CALCULATED.3IONS-SCNC: 12 MMOL/L (ref 7–15)
AST SERPL W P-5'-P-CCNC: 19 U/L (ref 0–45)
BASOPHILS # BLD AUTO: 0.1 10E3/UL (ref 0–0.2)
BASOPHILS NFR BLD AUTO: 1 %
BILIRUB SERPL-MCNC: 0.3 MG/DL
BUN SERPL-MCNC: 12.6 MG/DL (ref 8–23)
CALCIUM SERPL-MCNC: 8.5 MG/DL (ref 8.8–10.4)
CHLORIDE SERPL-SCNC: 104 MMOL/L (ref 98–107)
CREAT SERPL-MCNC: 0.96 MG/DL (ref 0.51–0.95)
EGFRCR SERPLBLD CKD-EPI 2021: 65 ML/MIN/1.73M2
EOSINOPHIL # BLD AUTO: 0.2 10E3/UL (ref 0–0.7)
EOSINOPHIL NFR BLD AUTO: 2 %
ERYTHROCYTE [DISTWIDTH] IN BLOOD BY AUTOMATED COUNT: 13.5 % (ref 10–15)
EST. AVERAGE GLUCOSE BLD GHB EST-MCNC: 117 MG/DL
GLUCOSE SERPL-MCNC: 90 MG/DL (ref 70–99)
HBA1C MFR BLD: 5.7 %
HCO3 SERPL-SCNC: 25 MMOL/L (ref 22–29)
HCT VFR BLD AUTO: 34.2 % (ref 35–47)
HGB BLD-MCNC: 10.7 G/DL (ref 11.7–15.7)
IMM GRANULOCYTES # BLD: 0 10E3/UL
IMM GRANULOCYTES NFR BLD: 0 %
LYMPHOCYTES # BLD AUTO: 3.8 10E3/UL (ref 0.8–5.3)
LYMPHOCYTES NFR BLD AUTO: 41 %
MCH RBC QN AUTO: 30.2 PG (ref 26.5–33)
MCHC RBC AUTO-ENTMCNC: 31.3 G/DL (ref 31.5–36.5)
MCV RBC AUTO: 97 FL (ref 78–100)
MONOCYTES # BLD AUTO: 0.5 10E3/UL (ref 0–1.3)
MONOCYTES NFR BLD AUTO: 6 %
NEUTROPHILS # BLD AUTO: 4.5 10E3/UL (ref 1.6–8.3)
NEUTROPHILS NFR BLD AUTO: 50 %
NRBC # BLD AUTO: 0 10E3/UL
NRBC BLD AUTO-RTO: 0 /100
PLATELET # BLD AUTO: 272 10E3/UL (ref 150–450)
POTASSIUM SERPL-SCNC: 4.4 MMOL/L (ref 3.4–5.3)
PROT SERPL-MCNC: 6.3 G/DL (ref 6.4–8.3)
RBC # BLD AUTO: 3.54 10E6/UL (ref 3.8–5.2)
SODIUM SERPL-SCNC: 141 MMOL/L (ref 135–145)
WBC # BLD AUTO: 9.1 10E3/UL (ref 4–11)

## 2025-01-02 PROCEDURE — 80053 COMPREHEN METABOLIC PANEL: CPT | Mod: ORL | Performed by: NURSE PRACTITIONER

## 2025-01-02 PROCEDURE — 85025 COMPLETE CBC W/AUTO DIFF WBC: CPT | Mod: ORL | Performed by: NURSE PRACTITIONER

## 2025-01-02 PROCEDURE — 36415 COLL VENOUS BLD VENIPUNCTURE: CPT | Mod: ORL | Performed by: NURSE PRACTITIONER

## 2025-01-02 PROCEDURE — 83036 HEMOGLOBIN GLYCOSYLATED A1C: CPT | Mod: ORL | Performed by: NURSE PRACTITIONER

## 2025-05-05 ENCOUNTER — LAB REQUISITION (OUTPATIENT)
Dept: LAB | Facility: CLINIC | Age: 70
End: 2025-05-05
Payer: COMMERCIAL

## 2025-05-05 DIAGNOSIS — R56.9 UNSPECIFIED CONVULSIONS (H): ICD-10-CM

## 2025-05-05 DIAGNOSIS — F32.4 MAJOR DEPRESSIVE DISORDER, SINGLE EPISODE, IN PARTIAL REMISSION: ICD-10-CM

## 2025-05-05 DIAGNOSIS — E11.40 TYPE 2 DIABETES MELLITUS WITH DIABETIC NEUROPATHY, UNSPECIFIED (H): ICD-10-CM

## 2025-05-05 DIAGNOSIS — E78.2 MIXED HYPERLIPIDEMIA: ICD-10-CM

## 2025-05-05 DIAGNOSIS — E55.9 VITAMIN D DEFICIENCY, UNSPECIFIED: ICD-10-CM

## 2025-05-05 DIAGNOSIS — R79.0 ABNORMAL LEVEL OF BLOOD MINERAL: ICD-10-CM

## 2025-05-08 LAB
ALBUMIN SERPL BCG-MCNC: 3.5 G/DL (ref 3.5–5.2)
ALP SERPL-CCNC: 92 U/L (ref 40–150)
ALT SERPL W P-5'-P-CCNC: <5 U/L (ref 0–50)
ANION GAP SERPL CALCULATED.3IONS-SCNC: 13 MMOL/L (ref 7–15)
AST SERPL W P-5'-P-CCNC: 11 U/L (ref 0–45)
BILIRUB SERPL-MCNC: 0.2 MG/DL
BUN SERPL-MCNC: 12.1 MG/DL (ref 8–23)
CALCIUM SERPL-MCNC: 8.2 MG/DL (ref 8.8–10.4)
CHLORIDE SERPL-SCNC: 106 MMOL/L (ref 98–107)
CREAT SERPL-MCNC: 0.9 MG/DL (ref 0.51–0.95)
EGFRCR SERPLBLD CKD-EPI 2021: 70 ML/MIN/1.73M2
ERYTHROCYTE [DISTWIDTH] IN BLOOD BY AUTOMATED COUNT: 13.5 % (ref 10–15)
EST. AVERAGE GLUCOSE BLD GHB EST-MCNC: 123 MG/DL
GLUCOSE SERPL-MCNC: 93 MG/DL (ref 70–99)
HBA1C MFR BLD: 5.9 %
HCO3 SERPL-SCNC: 24 MMOL/L (ref 22–29)
HCT VFR BLD AUTO: 32.9 % (ref 35–47)
HGB BLD-MCNC: 10.1 G/DL (ref 11.7–15.7)
LEVETIRACETAM SERPL-MCNC: 60.8 ÂΜG/ML (ref 10–40)
MAGNESIUM SERPL-MCNC: 2.1 MG/DL (ref 1.7–2.3)
MCH RBC QN AUTO: 29.9 PG (ref 26.5–33)
MCHC RBC AUTO-ENTMCNC: 30.7 G/DL (ref 31.5–36.5)
MCV RBC AUTO: 97 FL (ref 78–100)
PLATELET # BLD AUTO: 278 10E3/UL (ref 150–450)
POTASSIUM SERPL-SCNC: 4.1 MMOL/L (ref 3.4–5.3)
PROT SERPL-MCNC: 6.1 G/DL (ref 6.4–8.3)
RBC # BLD AUTO: 3.38 10E6/UL (ref 3.8–5.2)
SODIUM SERPL-SCNC: 143 MMOL/L (ref 135–145)
TSH SERPL DL<=0.005 MIU/L-ACNC: 1.57 UIU/ML (ref 0.3–4.2)
VIT D+METAB SERPL-MCNC: 54 NG/ML (ref 20–50)
WBC # BLD AUTO: 8.1 10E3/UL (ref 4–11)

## 2025-05-08 PROCEDURE — 84443 ASSAY THYROID STIM HORMONE: CPT | Mod: ORL | Performed by: NURSE PRACTITIONER

## 2025-05-08 PROCEDURE — 80053 COMPREHEN METABOLIC PANEL: CPT | Mod: ORL | Performed by: NURSE PRACTITIONER

## 2025-05-08 PROCEDURE — 80177 DRUG SCRN QUAN LEVETIRACETAM: CPT | Mod: ORL | Performed by: NURSE PRACTITIONER

## 2025-05-08 PROCEDURE — 83735 ASSAY OF MAGNESIUM: CPT | Mod: ORL | Performed by: NURSE PRACTITIONER

## 2025-05-08 PROCEDURE — 82306 VITAMIN D 25 HYDROXY: CPT | Mod: ORL | Performed by: NURSE PRACTITIONER

## 2025-05-08 PROCEDURE — 83036 HEMOGLOBIN GLYCOSYLATED A1C: CPT | Mod: ORL | Performed by: NURSE PRACTITIONER

## 2025-05-08 PROCEDURE — P9604 ONE-WAY ALLOW PRORATED TRIP: HCPCS | Mod: ORL | Performed by: NURSE PRACTITIONER

## 2025-05-08 PROCEDURE — 36415 COLL VENOUS BLD VENIPUNCTURE: CPT | Mod: ORL | Performed by: NURSE PRACTITIONER

## 2025-05-08 PROCEDURE — 85027 COMPLETE CBC AUTOMATED: CPT | Mod: ORL | Performed by: NURSE PRACTITIONER

## 2025-05-28 ENCOUNTER — LAB REQUISITION (OUTPATIENT)
Dept: LAB | Facility: CLINIC | Age: 70
End: 2025-05-28
Payer: COMMERCIAL

## 2025-05-28 DIAGNOSIS — R56.9 UNSPECIFIED CONVULSIONS (H): ICD-10-CM

## 2025-05-28 DIAGNOSIS — D64.9 ANEMIA, UNSPECIFIED: ICD-10-CM

## 2025-05-28 PROCEDURE — 85018 HEMOGLOBIN: CPT | Mod: ORL | Performed by: NURSE PRACTITIONER

## 2025-05-28 PROCEDURE — P9604 ONE-WAY ALLOW PRORATED TRIP: HCPCS | Mod: ORL | Performed by: NURSE PRACTITIONER

## 2025-05-28 PROCEDURE — 36415 COLL VENOUS BLD VENIPUNCTURE: CPT | Mod: ORL | Performed by: NURSE PRACTITIONER

## 2025-05-28 PROCEDURE — 80177 DRUG SCRN QUAN LEVETIRACETAM: CPT | Mod: ORL | Performed by: NURSE PRACTITIONER

## 2025-05-29 LAB
HGB BLD-MCNC: 11.1 G/DL (ref 11.7–15.7)
MCV RBC AUTO: 97 FL (ref 78–100)

## 2025-05-30 LAB — LEVETIRACETAM SERPL-MCNC: 34.4 ÂΜG/ML (ref 10–40)

## 2025-06-09 ENCOUNTER — LAB REQUISITION (OUTPATIENT)
Dept: LAB | Facility: CLINIC | Age: 70
End: 2025-06-09
Payer: COMMERCIAL

## 2025-06-09 DIAGNOSIS — R56.9 UNSPECIFIED CONVULSIONS (H): ICD-10-CM

## 2025-06-09 DIAGNOSIS — D64.9 ANEMIA, UNSPECIFIED: ICD-10-CM

## 2025-06-12 LAB
HGB BLD-MCNC: 10.5 G/DL (ref 11.7–15.7)
LEVETIRACETAM SERPL-MCNC: 32 ÂΜG/ML (ref 10–40)
MCV RBC AUTO: 97 FL (ref 78–100)

## 2025-06-12 PROCEDURE — 80177 DRUG SCRN QUAN LEVETIRACETAM: CPT | Mod: ORL | Performed by: NURSE PRACTITIONER

## 2025-06-12 PROCEDURE — P9604 ONE-WAY ALLOW PRORATED TRIP: HCPCS | Mod: ORL | Performed by: NURSE PRACTITIONER

## 2025-06-12 PROCEDURE — 85018 HEMOGLOBIN: CPT | Mod: ORL | Performed by: NURSE PRACTITIONER

## 2025-06-12 PROCEDURE — 36415 COLL VENOUS BLD VENIPUNCTURE: CPT | Mod: ORL | Performed by: NURSE PRACTITIONER
